# Patient Record
Sex: FEMALE | Race: WHITE | Employment: OTHER | URBAN - METROPOLITAN AREA
[De-identification: names, ages, dates, MRNs, and addresses within clinical notes are randomized per-mention and may not be internally consistent; named-entity substitution may affect disease eponyms.]

---

## 2017-08-11 ENCOUNTER — HOSPITAL ENCOUNTER (EMERGENCY)
Age: 22
Discharge: HOME | End: 2017-08-11
Attending: STUDENT IN AN ORGANIZED HEALTH CARE EDUCATION/TRAINING PROGRAM | Admitting: STUDENT IN AN ORGANIZED HEALTH CARE EDUCATION/TRAINING PROGRAM
Payer: COMMERCIAL

## 2017-08-11 VITALS
OXYGEN SATURATION: 100 % | RESPIRATION RATE: 18 BRPM | HEART RATE: 108 BPM | WEIGHT: 154.76 LBS | TEMPERATURE: 98.6 F | DIASTOLIC BLOOD PRESSURE: 82 MMHG | SYSTOLIC BLOOD PRESSURE: 128 MMHG | BODY MASS INDEX: 24.35 KG/M2

## 2017-08-11 DIAGNOSIS — S01.112A EYEBROW LACERATION, LEFT, INITIAL ENCOUNTER: Primary | ICD-10-CM

## 2017-08-11 PROCEDURE — 77030002986 HC SUT PROL J&J -A

## 2017-08-11 PROCEDURE — 99283 EMERGENCY DEPT VISIT LOW MDM: CPT

## 2017-08-11 PROCEDURE — 75810000293 HC SIMP/SUPERF WND  RPR

## 2017-08-11 PROCEDURE — 74011000250 HC RX REV CODE- 250: Performed by: EMERGENCY MEDICINE

## 2017-08-11 PROCEDURE — 77030018836 HC SOL IRR NACL ICUM -A

## 2017-08-11 RX ORDER — BACITRACIN 500 UNIT/G
1 PACKET (EA) TOPICAL
Status: COMPLETED | OUTPATIENT
Start: 2017-08-11 | End: 2017-08-11

## 2017-08-11 RX ORDER — LIDOCAINE HYDROCHLORIDE AND EPINEPHRINE 10; 10 MG/ML; UG/ML
3 INJECTION, SOLUTION INFILTRATION; PERINEURAL ONCE
Status: COMPLETED | OUTPATIENT
Start: 2017-08-11 | End: 2017-08-11

## 2017-08-11 RX ORDER — MINOCYCLINE HYDROCHLORIDE 50 MG/1
CAPSULE ORAL DAILY
COMMUNITY
End: 2019-05-23

## 2017-08-11 RX ADMIN — BACITRACIN 1 PACKET: 500 OINTMENT TOPICAL at 21:00

## 2017-08-11 RX ADMIN — LIDOCAINE HYDROCHLORIDE AND EPINEPHRINE 30 MG: 10; 10 INJECTION, SOLUTION INFILTRATION; PERINEURAL at 21:00

## 2017-08-12 NOTE — DISCHARGE INSTRUCTIONS
We hope that we have addressed all of your medical concerns. The examination and treatment you received in the Emergency Department were for an emergent problem and were not intended as complete care. It is important that you follow up with your healthcare provider(s) for ongoing care. If your symptoms worsen or do not improve as expected, and you are unable to reach your usual health care provider(s), you should return to the Emergency Department. Today's healthcare is undergoing tremendous change, and patient satisfaction surveys are one of the many tools to assess the quality of medical care. You may receive a survey from the [facility] regarding your experience in the Emergency Department. I hope that your experience has been completely positive, particularly the medical care that I provided. As such, please participate in the survey; anything less than excellent does not meet my expectations or intentions. Thank you for allowing us to provide you with medical care today. We realize that you have many choices for your emergency care needs. Please choose us in the future for any continued health care needs. Lonnie Diallo [facility], [address]: [phone]            No results found for this or any previous visit (from the past 24 hour(s)). No results found. Cuts on the Face Closed With Stitches: Care Instructions  Your Care Instructions  A cut on your face can be on your chin, cheek, nose, forehead, eyelid, lip, or ear. The doctor used stitches to close the cut. Using stitches helps the cut heal and reduces scarring. The doctor may also have called in a specialist, such as a plastic surgeon, to close the cut. If the cut went deep and through the skin, the doctor may have put in two layers of stitches. The deeper layer brings the deep part of the cut together.  These stitches will dissolve and don't need to be removed. The stitches in the upper layer are the ones you see on the cut. You will probably have a bandage. You will need to have the stitches removed, usually in 3 to 5 days. The doctor has checked you carefully, but problems can develop later. If you notice any problems or new symptoms, get medical treatment right away. Follow-up care is a key part of your treatment and safety. Be sure to make and go to all appointments, and call your doctor if you are having problems. It's also a good idea to know your test results and keep a list of the medicines you take. How can you care for yourself at home? · Keep the cut dry for the first 24 to 48 hours. After this, you can shower if your doctor okays it. Pat the cut dry. · Don't soak the cut, such as in a bathtub. Your doctor will tell you when it's safe to get the cut wet. · If your doctor told you how to care for your cut, follow your doctor's instructions. If you did not get instructions, follow this general advice:  ¨ After the first 24 to 48 hours, wash around the cut with clean water 2 times a day. Don't use hydrogen peroxide or alcohol, which can slow healing. ¨ You may cover the cut with a thin layer of petroleum jelly, such as Vaseline, and a nonstick bandage. ¨ Apply more petroleum jelly and replace the bandage as needed. · Avoid any activity that could cause your cut to reopen. · Do not remove the stitches on your own. Your doctor will tell you when to come back to have the stitches removed. · Be safe with medicines. Take pain medicines exactly as directed. ¨ If the doctor gave you a prescription medicine for pain, take it as prescribed. ¨ If you are not taking a prescription pain medicine, ask your doctor if you can take an over-the-counter medicine. When should you call for help? Call your doctor now or seek immediate medical care if:  · You have new pain, or your pain gets worse.   · The skin near the cut is cold or pale or changes color. · You have tingling, weakness, or numbness near the cut. · The cut starts to bleed, and blood soaks through the bandage. Oozing small amounts of blood is normal.  · You have symptoms of infection, such as:  ¨ Increased pain, swelling, warmth, or redness around the cut. ¨ Red streaks leading from the cut. ¨ Pus draining from the cut. ¨ A fever. Watch closely for changes in your health, and be sure to contact your doctor if:  · You do not get better as expected. Where can you learn more? Go to http://tila-jeannette.info/. Enter M384 in the search box to learn more about \"Cuts on the Face Closed With Stitches: Care Instructions. \"  Current as of: March 20, 2017  Content Version: 11.3  © 4174-7844 [employer]. Care instructions adapted under license by [employer] (which disclaims liability or warranty for this information). If you have questions about a medical condition or this instruction, always ask your healthcare professional. [place] [address] any warranty or liability for your use of this information.

## 2017-08-12 NOTE — ED NOTES
Assumed care of patient. Patient resting comfortably with no complaints at this time. Call bell within reach. POC discussed.

## 2017-08-12 NOTE — ED TRIAGE NOTES
Triage: patient states she fell and has a cut in her left eyebrow. No meds PTA. Hemodynamically stable.

## 2017-08-12 NOTE — ED NOTES
Bacitracin placed onto suture site. Patient tolerated well. Patient educated on cleaning and follow up for sutures. Patient verbalized understanding. Ping DUGGAN gave and reviewed discharge instructions with the pt. The pt verbalized understanding. The pt was given opportunity for questions. Patient discharged in stable condition to the waiting room via ambulation.

## 2017-08-12 NOTE — ED PROVIDER NOTES
HPI Comments: 26 yo female presents to ER for laceration to left eyebrow. Pt was out his morning at 6:00 am when she slipped and fell cutting eyebrow. Pt admits to drinking last night. Pt with no LOC. No nausea, vomiting, headache. No numbness/tingling of the fingers. No dizziness or lightheadedness. No abdominal pain, no visual disturbances. No body aches. No back pain, no medicines taken prior to arrival. Tetanus is up to date. Per mom patient acting normal self. Pain rated 2/10. Social hx  Lives with parents  +smoker      Patient is a 25 y.o. female presenting with skin laceration. The history is provided by the patient. Laceration    Pertinent negatives include no numbness and no weakness. Past Medical History:   Diagnosis Date    Contact dermatitis and other eczema, due to unspecified cause     Acne    Depression     Irregular menses     PCOS (polycystic ovarian syndrome)     PCOS (polycystic ovarian syndrome)        Past Surgical History:   Procedure Laterality Date    HX ADENOIDECTOMY  2001    HX TONSILLECTOMY  2010    HX WISDOM TEETH EXTRACTION           Family History:   Problem Relation Age of Onset    Alcohol abuse Father     Diabetes Maternal Grandfather     Other Maternal Grandmother      macular degeneration       Social History     Social History    Marital status: SINGLE     Spouse name: N/A    Number of children: N/A    Years of education: N/A     Occupational History    Not on file. Social History Main Topics    Smoking status: Current Every Day Smoker     Packs/day: 0.25     Years: 1.00     Types: Cigarettes    Smokeless tobacco: Never Used    Alcohol use No    Drug use: No    Sexual activity: Not Currently     Partners: Male     Birth control/ protection: Condom, Pill     Other Topics Concern    Not on file     Social History Narrative         ALLERGIES: Sulfa (sulfonamide antibiotics)    Review of Systems   Constitutional: Negative for chills and fatigue. HENT: Negative for trouble swallowing. Eyes: Negative for photophobia and visual disturbance. Respiratory: Negative for cough, chest tightness and shortness of breath. Cardiovascular: Negative for chest pain. Gastrointestinal: Negative for abdominal pain, nausea and vomiting. Musculoskeletal: Negative for arthralgias, joint swelling, myalgias, neck pain and neck stiffness. Skin: Positive for wound. Negative for color change and rash. Neurological: Negative for dizziness, weakness, light-headedness, numbness and headaches. All other systems reviewed and are negative. Vitals:    08/11/17 2013 08/11/17 2015   BP:  (!) 137/97   Pulse:  (!) 128   Resp:  18   Temp:  98.2 °F (36.8 °C)   SpO2:  100%   Weight: 70.2 kg (154 lb 12.2 oz)             Physical Exam   Constitutional: She is oriented to person, place, and time. She appears well-developed and well-nourished. HENT:   Head: Normocephalic and atraumatic. Right Ear: External ear normal.   Left Ear: External ear normal.   Nose: Nose normal.   Mouth/Throat: Oropharynx is clear and moist.   Left eyebrow:  1 cm horizontal laceration. No bony tenderness. Eyes: Conjunctivae and EOM are normal. Pupils are equal, round, and reactive to light. Right eye exhibits no discharge. Left eye exhibits no discharge. Neck: Normal range of motion. Neck supple. No cervical midline tenderness to palpation of cspine. No stepoffs, no deformity, no edema, no ecchymosis. NO midline pain with FROM of neck. Cardiovascular: Normal rate, regular rhythm and normal heart sounds. Pulmonary/Chest: Effort normal and breath sounds normal. No respiratory distress. She exhibits no tenderness. No chest wall pain with palpation. Abdominal: Soft. Normal appearance and bowel sounds are normal. She exhibits no distension and no mass. There is no hepatosplenomegaly, splenomegaly or hepatomegaly. There is no tenderness.  There is no rigidity, no rebound, no guarding, no CVA tenderness, no tenderness at McBurney's point and negative Jo's sign. Musculoskeletal: Normal range of motion. She exhibits no edema or tenderness. NO TLS spine pain with palpation. No edema, no ecchymosis, no redness or warmth. 5/5  strength bilaterally  5/5 flexion/extension of hips bilaterally   Neurological: She is alert and oriented to person, place, and time. She has normal reflexes. No cranial nerve deficit. She exhibits normal muscle tone. Coordination normal.   Cranial Nerves:  I: smell  Not tested   II: pupils  Equal, round, reactive to light   III,VII: ptosis  none   III,IV,VI: extraocular muscles   normal   V: mastication  normal   V: facial light touch sensation   normal   VII: facial muscle function   symmetric   VIII: hearing  symmetric   IX: soft palate elevation   normal   XI: sternocleidomastoid strength  5/5   XII: tongue   midline          Skin: Skin is warm and dry. No rash noted. No erythema. Psychiatric: She has a normal mood and affect. Her behavior is normal. Judgment and thought content normal.   Nursing note and vitals reviewed. MDM  Number of Diagnoses or Management Options  Eyebrow laceration, left, initial encounter:   Diagnosis management comments: 24 yo female presenting for eyebrow laceration. Injury 14 hours prior to arrival. With being on face, will wash, clean and repair. 9:44 PM  Wound cleaned and repaired without problem. Pt awake and alert. No neuro deficits. Will discharge with pcp followup. Patient's results have been reviewed with them. Patient and/or family have verbally conveyed their understanding and agreement of the patient's signs, symptoms, diagnosis, treatment and prognosis and additionally agree to follow up as recommended or return to the Emergency Room should their condition change prior to follow-up.   Discharge instructions have also been provided to the patient with some educational information regarding their diagnosis as well a list of reasons why they would want to return to the ER prior to their follow-up appointment should their condition change. ED Course       Wound Repair  Date/Time: 8/11/2017 9:24 PM  Performed by: Radu Manning provider: Jeferson Marquis  Preparation: skin prepped with Betadine, skin prepped with Shur-Clens and sterile field established  Pre-procedure re-eval: Immediately prior to the procedure, the patient was reevaluated and found suitable for the planned procedure and any planned medications. Time out: Immediately prior to the procedure a time out was called to verify the correct patient, procedure, equipment, staff and marking as appropriate. .  Location details: right eyebrow  Wound length:2.5 cm or less  Anesthesia: local infiltration    Anesthesia:  Anesthesia: local infiltration  Local Anesthetic: lidocaine 1% with epinephrine   Anesthetic total: 2 mL  Foreign bodies: no foreign bodies  Irrigation solution: saline  Irrigation method: syringe  Skin closure: Prolene  Number of sutures: 4  Technique: simple  Approximation: close  Dressing: antibiotic ointment  Patient tolerance: Patient tolerated the procedure well with no immediate complications  My total time at bedside, performing this procedure was 16-30 minutes (16). Comments: Wound explored bloodless field. Wound superficial. No bone exposed. Pt case including HPI, PE, and all available lab and radiology results has been discussed with attending physician. Opportunity to evaluate patient has been provided to ER attending. Discharge and prescription plan has been agreed upon.

## 2019-05-22 NOTE — PROGRESS NOTES
Romana Arroyo  26 y.o. female  1995  [id number]  [id number]     [facility] FAMILY MEDICINE: Progress Note       Encounter Date: 5/23/2019    Chief Complaint   Patient presents with    New Patient     seen at [address] [place] [address] Depression     wants to discuss restarting medication (effexor 225mg, has been off for a few months)    Cyst     knot under left armpit x1 month       History provided by patient  History of Present Illness   Romana Arroyo is a 26 y.o. female who presents to clinic today for:      [address] [place] [address] patient who presents to establish PCP care. I personally reviewed and updated the patient's medical, surgical, family and social history. PREVIOUS PRIMARY CARE PROVIDER and SPECIALISTS  [facility]. Patient decided to come to [facility] due to insurance. RECORDS  Provided by patient: none     SPECIALISTS  Patient Care Team:  Hammad Gray MD as PCP - General (Family Practice)  Michele Marrero MD (Pediatric Endocrinology)    Depression Review:  Patient is seen for depression. Current treatment includes Effexor and no other therapies. Prior treatments: numerous/does not recall names. Ongoig symptoms include: anhedonia, depressed mood and poor concentration weight gain. Patient denies: appetite loss, death fantasies, decreased sleep and inappropriate guilt palpitations, dizziness, racing thoughts. Reported side effects from the treatment: n/a    PCOS  Patient history of PCOS and had been on metformin in the past for metabolic syndrome. Acne - patient requesting minocycline. She has been placed on it by dermatology int he past with good response. Since then has been using facial cleanser BID and topical cream but reports that creams cause severe dry skin. [employer]  Did not share w/[facility]. Release signed for records.    Health Maintenance Due   Topic Date Due    Pneumococcal 0-64 years (1 of 1 - PPSV23) 09/06/2003    HPV Age 9Y-34Y (1 - Female 3-dose series) 09/06/2012    DTaP/Tdap/Td series (1 - Tdap) 09/06/2018    PAP AKA CERVICAL CYTOLOGY  09/06/2018     Review of Systems   Review of Systems   Constitutional: Negative for chills, fever, malaise/fatigue and weight loss. HENT: Negative for congestion and sinus pain. Cardiovascular: Negative for chest pain and palpitations. Gastrointestinal: Negative for abdominal pain, constipation, diarrhea, nausea and vomiting. Genitourinary: Negative for dysuria, frequency and urgency. Skin: Positive for rash (acne on face). Negative for itching. Neurological: Negative for dizziness, tingling, tremors and headaches. Psychiatric/Behavioral: Positive for depression. Negative for hallucinations, memory loss, substance abuse and suicidal ideas. The patient is not nervous/anxious and does not have insomnia. Vitals/Objective:     Vitals:    05/23/19 1313   BP: 119/78   Pulse: 85   Resp: 18   Temp: 98.7 °F (37.1 °C)   TempSrc: Oral   SpO2: 100%   Weight: 184 lb (83.5 kg)   Height: 5' 7\" (1.702 m)     Body mass index is 28.82 kg/m². Wt Readings from Last 3 Encounters:   05/23/19 184 lb (83.5 kg)   08/11/17 154 lb 12.2 oz (70.2 kg) (84 %, Z= 0.98)*   06/30/16 170 lb 9.6 oz (77.4 kg) (93 %, Z= 1.46)*     * Growth percentiles are based on CDC (Girls, 2-20 Years) data. Physical Exam   Constitutional: She is oriented to person, place, and time. She appears well-developed and well-nourished. HENT:   Head: Normocephalic and atraumatic. Cardiovascular: Normal rate and regular rhythm. Pulmonary/Chest: Effort normal and breath sounds normal.   Musculoskeletal: She exhibits no edema. Neurological: She is alert and oriented to person, place, and time. Skin: Skin is warm. Rash (acne on face) noted. No results found for this or any previous visit (from the past 24 hour(s)).   Assessment and Plan:     Encounter Diagnoses     ICD-10-CM ICD-9-CM   1. Major depressive disorder with single episode, [facility] F32.4 296.25   2. PCOS (polycystic ovarian syndrome) E28.2 256.4   3. Acne vulgaris L70.0 706.1       1. PCOS (polycystic ovarian syndrome)  - metFORMIN ER (GLUCOPHAGE XR) 500 mg tablet; Take 1 Tab by mouth daily (with dinner). Dispense: 90 Tab; Refill: 1    2. Major depressive disorder with single episode, in partial remission [facility])  Patient to continue see counselor. Resume Effexor.   - venlafaxine-SR (EFFEXOR-XR) 75 mg capsule; Take 1 Cap by mouth daily for 14 days, THEN 2 Caps daily for 14 days, THEN 2 Caps daily for 14 days. Dispense: 70 Cap; Refill: 0    3. Acne vulgaris  - minocycline (MINOCIN, DYNACIN) 100 mg capsule; Take 1 Cap by mouth two (2) times a day for 90 days. Dispense: 180 Cap; Refill: 0      I have discussed the diagnosis with the patient and the intended plan as seen in the above orders. she has expressed understanding. The patient has received an after-visit summary and questions were answered concerning future plans. I have discussed medication side effects and warnings with the patient as well. Electronically Signed: Markell Estrella MD     History/Allergies   Patients past medical, surgical and family histories were reviewed and updated.     Past Medical History:   Diagnosis Date    Depression     Eczema     PCOS (polycystic ovarian syndrome)       Past Surgical History:   Procedure Laterality Date    HX ADENOIDECTOMY  2001    HX TONSILLECTOMY  2010    HX WISDOM TEETH EXTRACTION       Family History   Problem Relation Age of Onset    Alcohol abuse Father     Diabetes Maternal Grandfather     Other Maternal Grandmother         macular degeneration    Cancer Maternal Grandmother         skin    Depression Mother     Atopy Sister     No Known Problems Brother     No Known Problems Paternal Grandmother     No Known Problems Paternal Grandfather     Heart Disease Neg Hx     Hypertension Neg Hx      Social History     Tobacco Use    Smoking status: Current Every Day Smoker     Packs/day: 0.25     Years: 1.00     Pack years: 0.25     Types: Cigarettes    Smokeless tobacco: Never Used   Substance Use Topics    Alcohol use: No    Drug use: No          Allergies   Allergen Reactions    Sulfa (Sulfonamide Antibiotics) Anaphylaxis, Hives and Shortness of Breath       Disposition     Follow-up and Dispositions  ·   Return in about 3 weeks (around 6/13/2019) for f/u mood. .         Future Appointments   Date Time Provider Leesa Wiggins   6/12/2019  3:40 PM Trudy Flores MD [facility] SCHED            Current Medications after this visit     Current Outpatient Medications   Medication Sig    metFORMIN ER (GLUCOPHAGE XR) 500 mg tablet Take 1 Tab by mouth daily (with dinner).  venlafaxine-SR (EFFEXOR-XR) 75 mg capsule Take 1 Cap by mouth daily for 14 days, THEN 2 Caps daily for 14 days, THEN 2 Caps daily for 14 days.  minocycline (MINOCIN, DYNACIN) 100 mg capsule Take 1 Cap by mouth two (2) times a day for 90 days. No current facility-administered medications for this visit.       Medications Discontinued During This Encounter   Medication Reason    metFORMIN ER (GLUCOPHAGE XR) 750 mg tablet Dose Adjustment    VENLAFAXINE HCL (EFFEXOR PO) Reorder    minocycline (MINOCIN, DYNACIN) 50 mg capsule Not A Current Medication    famotidine (PEPCID) 20 mg tablet Not A Current Medication    norgestimate-ethinyl estradiol (ORTHO TRI-CYCLEN, TRI-SPRINTEC) 0.18/0.215/0.25 mg-35 mcg (28) tab Not A Current Medication    spironolactone (ALDACTONE) 25 mg tablet Not A Current Medication    venlafaxine 225 mg tr24 Not A Current Medication

## 2019-05-23 ENCOUNTER — OFFICE VISIT (OUTPATIENT)
Dept: FAMILY MEDICINE CLINIC | Age: 24
End: 2019-05-23

## 2019-05-23 VITALS
HEART RATE: 85 BPM | HEIGHT: 67 IN | WEIGHT: 184 LBS | OXYGEN SATURATION: 100 % | BODY MASS INDEX: 28.88 KG/M2 | TEMPERATURE: 98.7 F | DIASTOLIC BLOOD PRESSURE: 78 MMHG | SYSTOLIC BLOOD PRESSURE: 119 MMHG | RESPIRATION RATE: 18 BRPM

## 2019-05-23 DIAGNOSIS — L70.0 ACNE VULGARIS: ICD-10-CM

## 2019-05-23 DIAGNOSIS — E28.2 PCOS (POLYCYSTIC OVARIAN SYNDROME): ICD-10-CM

## 2019-05-23 DIAGNOSIS — F32.4 MAJOR DEPRESSIVE DISORDER WITH SINGLE EPISODE, IN PARTIAL REMISSION (HCC): Primary | ICD-10-CM

## 2019-05-23 RX ORDER — MINOCYCLINE HYDROCHLORIDE 100 MG/1
100 CAPSULE ORAL 2 TIMES DAILY
Qty: 180 CAP | Refills: 0 | Status: SHIPPED | OUTPATIENT
Start: 2019-05-23 | End: 2019-08-21

## 2019-05-23 RX ORDER — METFORMIN HYDROCHLORIDE 500 MG/1
500 TABLET, EXTENDED RELEASE ORAL
Qty: 90 TAB | Refills: 1 | Status: SHIPPED | OUTPATIENT
Start: 2019-05-23 | End: 2020-02-11

## 2019-05-23 RX ORDER — VENLAFAXINE HYDROCHLORIDE 75 MG/1
CAPSULE, EXTENDED RELEASE ORAL
Qty: 70 CAP | Refills: 0 | Status: SHIPPED | OUTPATIENT
Start: 2019-05-23 | End: 2019-07-02 | Stop reason: SDUPTHER

## 2019-05-23 RX ORDER — VENLAFAXINE HYDROCHLORIDE 225 MG/1
TABLET, EXTENDED RELEASE ORAL
Refills: 0 | COMMUNITY
Start: 2019-03-01 | End: 2019-05-23

## 2019-05-23 NOTE — PROGRESS NOTES
1. Have you been to the ER, urgent care clinic, or been hospitalized since your last visit? No     2. Have you seen or consulted any other health care providers outside of the [address] [place] [address] since your last visit?   No     Reviewed record in preparation for visit and have necessary documentation  opportunity was given for questions  Goals that were addressed and/or need to be completed during or after this appointment include   Health Maintenance Due   Topic Date Due    Pneumococcal 0-64 years (1 of 1 - PPSV23) 09/06/2003    HPV Age 9Y-34Y (1 - Female 3-dose series) 09/06/2012    DTaP/Tdap/Td series (1 - Tdap) 09/06/2018    PAP AKA CERVICAL CYTOLOGY  09/06/2018

## 2019-05-23 NOTE — PATIENT INSTRUCTIONS
Acne Medicine: Care Instructions  Your Care Instructions    Medicines can help acne. They can clean skin pores, kill germs, and reduce skin oil. And they can reduce the effects of hormones. The type of medicine you take depends on the type of acne you have. The best treatment often is a mix of medicines. For example, you might take pills and put medicine on your skin. Common acne medicines include:  · Benzoyl peroxide. This includes Benzac or Brevoxyl. · Salicylic acid. This includes Propa pH or Stridex. · Retinoid medicines you put on your skin. These include adapalene (Differin) and tretinoin (Retin-A). · Antibiotic pills or ointments. These include erythromycin and tetracycline. · Some birth control pills. Antibiotics for acne can cause side effects. They include yeast infections (in women) and diarrhea. Let your doctor know if you have side effects. Tell your doctor if you are breastfeeding or if you're pregnant or think you might get pregnant. Some of these medicines are not safe to take when you are pregnant or breastfeeding. Women who take some medicines need to use birth control. Follow-up care is a key part of your treatment and safety. Be sure to make and go to all appointments, and call your doctor if you are having problems. It's also a good idea to know your test results and keep a list of the medicines you take. How can you care for yourself at home? · Take your medicines exactly as prescribed. Call your doctor if you think you are having a problem with your medicine. You will get more details on the specific medicines your doctor prescribes. When should you call for help? Call your doctor now or seek immediate medical care if:    · You have signs of an infection, such as:  ? Increased pain, swelling, warmth, and redness. ? Red streaks leading from the affected area. ? Pus draining from the area.   ? A fever.    Watch closely for changes in your health, and be sure to contact your doctor if:    · You think you may be having a problem with the medicine.     · You do not get better as expected. Where can you learn more? Go to http://tila-jeannette.info/. Enter C526 in the search box to learn more about \"Acne Medicine: Care Instructions. \"  Current as of: April 17, 2018  Content Version: 11.9  © 1022-1827 [employer]. Care instructions adapted under license by [employer] (which disclaims liability or warranty for this information). If you have questions about a medical condition or this instruction, always ask your healthcare professional. [place] [address] any warranty or liability for your use of this information.

## 2019-07-02 DIAGNOSIS — L70.0 ACNE VULGARIS: ICD-10-CM

## 2019-07-02 DIAGNOSIS — F32.4 MAJOR DEPRESSIVE DISORDER WITH SINGLE EPISODE, IN PARTIAL REMISSION (HCC): ICD-10-CM

## 2019-07-02 NOTE — TELEPHONE ENCOUNTER
Patient seeking refill. She had insurance issues but has cleared this up but was hoping to get this refilled before going out of town on 07/04/19.     Patient has appt scheduled for 07/16/19

## 2019-07-03 RX ORDER — VENLAFAXINE HYDROCHLORIDE 75 MG/1
75 CAPSULE, EXTENDED RELEASE ORAL DAILY
Qty: 30 CAP | Refills: 1 | Status: SHIPPED | OUTPATIENT
Start: 2019-07-03 | End: 2019-07-16 | Stop reason: SDUPTHER

## 2019-07-16 ENCOUNTER — OFFICE VISIT (OUTPATIENT)
Dept: FAMILY MEDICINE CLINIC | Age: 24
End: 2019-07-16

## 2019-07-16 VITALS
HEART RATE: 120 BPM | HEIGHT: 67 IN | TEMPERATURE: 98.5 F | SYSTOLIC BLOOD PRESSURE: 114 MMHG | RESPIRATION RATE: 16 BRPM | WEIGHT: 198 LBS | DIASTOLIC BLOOD PRESSURE: 80 MMHG | OXYGEN SATURATION: 99 % | BODY MASS INDEX: 31.08 KG/M2

## 2019-07-16 DIAGNOSIS — E28.2 PCOS (POLYCYSTIC OVARIAN SYNDROME): ICD-10-CM

## 2019-07-16 DIAGNOSIS — F32.4 MAJOR DEPRESSIVE DISORDER WITH SINGLE EPISODE, IN PARTIAL REMISSION (HCC): Primary | ICD-10-CM

## 2019-07-16 RX ORDER — VENLAFAXINE HYDROCHLORIDE 150 MG/1
150 CAPSULE, EXTENDED RELEASE ORAL DAILY
Qty: 90 CAP | Refills: 1 | Status: SHIPPED | OUTPATIENT
Start: 2019-07-16 | End: 2020-01-15

## 2019-07-16 NOTE — PROGRESS NOTES
1. Have you been to the ER, urgent care clinic since your last visit? Hospitalized since your last visit? No    2. Have you seen or consulted any other health care providers outside of the [address] [place] [address] since your last visit? Include any pap smears or colon screening.  No

## 2019-07-16 NOTE — PATIENT INSTRUCTIONS

## 2019-07-16 NOTE — PROGRESS NOTES
Radha Iglesias  26 y.o. female  1995  [id number]    [facility]  Progress Note     Encounter Date: 7/16/2019    Assessment and Plan:     Encounter Diagnoses     ICD-10-CM ICD-9-CM   1. Major depressive disorder with single episode, in partial remission ([facility] F32.4 296.25   2. PCOS (polycystic ovarian syndrome) E28.2 256.4       1. Major depressive disorder with single episode, in partial remission ([facility]  Doing well on current dose of effexor. No need to increase.  - venlafaxine-SR (EFFEXOR-XR) 150 mg capsule; Take 1 Cap by mouth daily. Dispense: 90 Cap; Refill: 1  - METABOLIC PANEL, BASIC    2. PCOS (polycystic ovarian syndrome)  Lab work. - LIPID PANEL  - TSH REFLEX TO T4  - HEMOGLOBIN A1C WITH EAG      I have discussed the diagnosis with the patient and the intended plan as seen in the above orders. she has expressed understanding. The patient has received an after-visit summary and questions were answered concerning future plans. I have discussed medication side effects and warnings with the patient as well. Electronically Signed: Cruz Blunt MD    Current Medications after this visit     Current Outpatient Medications   Medication Sig    venlafaxine-SR (EFFEXOR-XR) 150 mg capsule Take 1 Cap by mouth daily.  metFORMIN ER (GLUCOPHAGE XR) 500 mg tablet Take 1 Tab by mouth daily (with dinner).  minocycline (MINOCIN, DYNACIN) 100 mg capsule Take 1 Cap by mouth two (2) times a day for 90 days. No current facility-administered medications for this visit.       Medications Discontinued During This Encounter   Medication Reason    venlafaxine-SR [facility] 75 mg capsule Reorder     ~~~~~~~~~~~~~~~~~~~~~~~~~~~~~~~~~~~~~~~~~~~~~~    Chief Complaint   Patient presents with    Depression    Medication Evaluation       History provided by patient  History of Present Illness   Radha Iglesias is a 26 y.o. female who presents to clinic today for:  Depression Review:  Patient is seen for depression. Current treatment includes Effexor and no other therapies. Prior treatments:numerous/does not recall names. Ongoig symptoms include: depressed mood racing thoughts. Patient denies: anhedonia, hopelessness and inappropriate guilt palpitations, chest pain, shortness of breath. Reported side effects from the treatment: none    Health Maintenance  Asked to schedule appt for wellness. Health Maintenance Due   Topic Date Due    Pneumococcal 0-64 years (1 of 1 - PPSV23) 09/06/2003    HPV Age 9Y-34Y (1 - Female 3-dose series) 09/06/2012    DTaP/Tdap/Td series (1 - Tdap) 09/06/2018    PAP AKA CERVICAL CYTOLOGY  09/06/2018     Review of Systems   Review of Systems   Constitutional: Negative for chills, fever, malaise/fatigue and weight loss. Cardiovascular: Negative for chest pain and palpitations. Gastrointestinal: Negative for abdominal pain, constipation, diarrhea, nausea and vomiting. Skin: Negative for itching and rash. Neurological: Negative for dizziness, tingling and headaches. Psychiatric/Behavioral: Negative for depression, hallucinations and substance abuse. The patient is not nervous/anxious and does not have insomnia. Vitals/Objective:     Vitals:    07/16/19 1445   BP: 114/80   Pulse: (!) 120   Resp: 16   Temp: 98.5 °F (36.9 °C)   TempSrc: Oral   SpO2: 99%   Weight: 198 lb (89.8 kg)   Height: 5' 7\" (1.702 m)     Body mass index is 31.01 kg/m². Wt Readings from Last 3 Encounters:   07/16/19 198 lb (89.8 kg)   05/23/19 184 lb (83.5 kg)   08/11/17 154 lb 12.2 oz (70.2 kg) (84 %, Z= 0.98)*     * Growth percentiles are based on CDC (Girls, 2-20 Years) data. Physical Exam   Constitutional: She appears well-developed and well-nourished. HENT:   Head: Normocephalic and atraumatic. Right Ear: External ear normal.   Left Ear: External ear normal.   Cardiovascular: Normal rate and regular rhythm. No murmur heard.   [id number]   Pulmonary/Chest: Effort normal and breath sounds normal.        No results found for this or any previous visit (from the past 24 hour(s)). Disposition     Follow-up and Dispositions  ·   Return in about 3 months (around 10/16/2019) for Please schedule an appt for Well Woman with Pap., Please do fasting blood work 2 days prior to appt. .       No future appointments. History   Patient's past medical, surgical and family histories were reviewed and updated.     Past Medical History:   Diagnosis Date    Depression     Eczema     PCOS (polycystic ovarian syndrome)      Past Surgical History:   Procedure Laterality Date    HX ADENOIDECTOMY  2001    HX TONSILLECTOMY  2010    HX WISDOM TEETH EXTRACTION       Family History   Problem Relation Age of Onset    Alcohol abuse Father     Diabetes Maternal Grandfather     Other Maternal Grandmother         macular degeneration    Cancer Maternal Grandmother         skin    Depression Mother     Atopy Sister     No Known Problems Brother     No Known Problems Paternal Grandmother     No Known Problems Paternal Grandfather     Heart Disease Neg Hx     Hypertension Neg Hx      Social History     Tobacco Use    Smoking status: Current Every Day Smoker     Packs/day: 0.25     Years: 1.00     Pack years: 0.25     Types: Cigarettes    Smokeless tobacco: Never Used   Substance Use Topics    Alcohol use: No    Drug use: No       Allergies     Allergies   Allergen Reactions    Sulfa (Sulfonamide Antibiotics) Anaphylaxis, Hives and Shortness of Breath

## 2020-02-11 ENCOUNTER — OFFICE VISIT (OUTPATIENT)
Dept: FAMILY MEDICINE CLINIC | Age: 25
End: 2020-02-11

## 2020-02-11 VITALS
TEMPERATURE: 98.3 F | OXYGEN SATURATION: 97 % | RESPIRATION RATE: 16 BRPM | WEIGHT: 215.8 LBS | SYSTOLIC BLOOD PRESSURE: 110 MMHG | BODY MASS INDEX: 33.87 KG/M2 | DIASTOLIC BLOOD PRESSURE: 81 MMHG | HEIGHT: 67 IN | HEART RATE: 118 BPM

## 2020-02-11 DIAGNOSIS — F32.4 MAJOR DEPRESSIVE DISORDER WITH SINGLE EPISODE, IN PARTIAL REMISSION (HCC): Primary | ICD-10-CM

## 2020-02-11 DIAGNOSIS — R00.0 TACHYCARDIA: ICD-10-CM

## 2020-02-11 DIAGNOSIS — E28.2 PCOS (POLYCYSTIC OVARIAN SYNDROME): ICD-10-CM

## 2020-02-11 RX ORDER — METFORMIN HYDROCHLORIDE 500 MG/1
500 TABLET, EXTENDED RELEASE ORAL
Qty: 90 TAB | Refills: 1 | Status: SHIPPED | OUTPATIENT
Start: 2020-02-11 | End: 2020-05-22 | Stop reason: SDUPTHER

## 2020-02-11 RX ORDER — VENLAFAXINE HYDROCHLORIDE 225 MG/1
225 TABLET, EXTENDED RELEASE ORAL DAILY
Qty: 30 TAB | Refills: 5 | Status: SHIPPED | OUTPATIENT
Start: 2020-02-11 | End: 2020-03-19

## 2020-02-11 RX ORDER — VENLAFAXINE HYDROCHLORIDE 150 MG/1
CAPSULE, EXTENDED RELEASE ORAL
Qty: 30 CAP | Refills: 0 | Status: CANCELLED | OUTPATIENT
Start: 2020-02-11

## 2020-02-11 NOTE — PROGRESS NOTES
Identified pt with two pt identifiers(name and ). Reviewed record in preparation for visit and have obtained necessary documentation. Chief Complaint   Patient presents with    Medication Evaluation     f/u    Medication Refill        Health Maintenance Due   Topic    Pneumococcal 0-64 years (1 of 1 - PPSV23)    HPV Age 9Y-34Y (1 - Female 2-dose series)    DTaP/Tdap/Td series (1 - Tdap)    PAP AKA CERVICAL CYTOLOGY    -Pt declines flu shot    Coordination of Care Questionnaire:  :   1) Have you been to an emergency room, urgent care, or hospitalized since your last visit? If yes, where when, and reason for visit? no      2. Have seen or consulted any other health care provider since your last visit? If yes, where when, and reason for visit?  no        Patient is accompanied by self I have received verbal consent from Chan Ortiz to discuss any/all medical information while they are present in the room.

## 2020-02-11 NOTE — PROGRESS NOTES
Herman Perez  27 y.o. female  1995  [id number]    [facility]  Progress Note     Encounter Date: 2/11/2020    Assessment and Plan:     Encounter Diagnoses     ICD-10-CM ICD-9-CM   1. Major depressive disorder with single episode, in partial remission ([facility] F32.4 296.25   2. PCOS (polycystic ovarian syndrome) E28.2 256.4   3. Tachycardia R00.0 785.0       1. Major depressive disorder with single episode, in partial remission ([facility])  No controlled. Increased dose of effexor to 225 mg. Close follow up on medication is no change in 2 weeks. - Venlafaxine-ER 24 HR (EFFEXOR-ER) 225 mg tr24 tablet; Take 1 Tab by mouth daily. Dispense: 30 Tab; Refill: 5    2. PCOS (polycystic ovarian syndrome)  3. Tachycardia  Resume metformin. CHeck labs including TSH given tachycardia. - metFORMIN ER (GLUCOPHAGE XR) 500 mg tablet; Take 1 Tab by mouth daily (with dinner). Dispense: 90 Tab; Refill: 1  - HEMOGLOBIN A1C WITH EAG; Future  - LIPID PANEL; Future  - METABOLIC PANEL, COMPREHENSIVE; Future      I have discussed the diagnosis with the patient and the intended plan as seen in the above orders. she has expressed understanding. The patient has received an after-visit summary and questions were answered concerning future plans. I have discussed medication side effects and warnings with the patient as well. Electronically Signed: Carla Cheema MD    Current Medications after this visit     Current Outpatient Medications   Medication Sig    metFORMIN ER (GLUCOPHAGE XR) 500 mg tablet Take 1 Tab by mouth daily (with dinner).  Venlafaxine-ER 24 HR (EFFEXOR-ER) 225 mg tr24 tablet Take 1 Tab by mouth daily. No current facility-administered medications for this visit.       Medications Discontinued During This Encounter   Medication Reason    metFORMIN ER (GLUCOPHAGE XR) 500 mg tablet Not A Current Medication    venlafaxine-SR (EFFEXOR-XR) 150 mg capsule Dose Adjustment ~~~~~~~~~~~~~~~~~~~~~~~~~~~~~~~~~~~~~~~~~~~~~~    Chief Complaint   Patient presents with    Medication Evaluation     f/u    Medication Refill       History provided by patient  History of Present Illness   Madeleine Sapp is a 27 y.o. female who presents to clinic today for:    Depression Review:  Patient is seen for anxiety disorder. .  Current treatment includes Effexor and no other therapies. Reported side effects from the treatment: none    3 most recent PHQ Screens 2/11/2020   Little interest or pleasure in doing things Nearly every day   Feeling down, depressed, irritable, or hopeless Not at all   Total Score PHQ 2 3   Trouble falling or staying asleep, or sleeping too much Several days   Feeling tired or having little energy Nearly every day   Poor appetite, weight loss, or overeating Several days   Feeling bad about yourself - or that you are a failure or have let yourself or your family down Not at all   Trouble concentrating on things such as school, work, reading, or watching TV Nearly every day   Moving or speaking so slowly that other people could have noticed; or the opposite being so fidgety that others notice Not at all   Thoughts of being better off dead, or hurting yourself in some way Not at all   PHQ 9 Score 11     JACINTA 2/7 2/11/2020   Feeling nervous, anxious or on edge? 1   Not being able to stop or control worrying? 0   JACINTA-2 Subtotal 1   Worrying too much about different things? 1   Trouble relaxing? 2   Being so restless that it is hard to sit still? 0   Becoming easily annoyed or irritable? 0   Feeling afraid as if something awful might happen? 0   JACINTA-7 Total Score 4           Health Maintenance  Asked patient to schedule a Wellness appt to discuss issues.   Health Maintenance Due   Topic Date Due    Pneumococcal 0-64 years (1 of 1 - PPSV23) 09/06/2003    HPV Age 9Y-34Y (1 - Female 2-dose series) 09/06/2008    DTaP/Tdap/Td series (1 - Tdap) 09/06/2008    PAP AKA CERVICAL CYTOLOGY  09/06/2018     Review of Systems   Review of Systems   Constitutional:        See HPI for pertinent review of systems        Vitals/Objective:     Vitals:    02/11/20 1300   BP: 110/81   Pulse: (!) 118   Resp: 16   Temp: 98.3 °F (36.8 °C)   TempSrc: Oral   SpO2: 97%   Weight: 215 lb 12.8 oz (97.9 kg)   Height: 5' 7\" (1.702 m)     Body mass index is 33.8 kg/m². Wt Readings from Last 3 Encounters:   02/11/20 215 lb 12.8 oz (97.9 kg)   07/16/19 198 lb (89.8 kg)   05/23/19 184 lb (83.5 kg)       Physical Exam  Constitutional:       General: She is not in acute distress. Appearance: Normal appearance. She is well-developed. She is not diaphoretic. HENT:      Head: Normocephalic and atraumatic. Right Ear: Tympanic membrane, ear canal and external ear normal.      Left Ear: Tympanic membrane, ear canal and external ear normal.      Mouth/Throat:      Pharynx: No oropharyngeal exudate or posterior oropharyngeal erythema. Eyes:      General:         Right eye: No discharge. Left eye: No discharge. Conjunctiva/sclera: Conjunctivae normal.   Cardiovascular:      Rate and Rhythm: Normal rate and regular rhythm. Heart sounds: S1 normal and S2 normal. No murmur. Pulmonary:      Effort: Pulmonary effort is normal.      Breath sounds: Normal breath sounds. No rales. Musculoskeletal:      Right lower leg: No edema. Left lower leg: No edema. Skin:     General: Skin is warm and dry. Capillary Refill: Capillary refill takes less than 2 seconds. Neurological:      Mental Status: She is alert and oriented to person, place, and time. No results found for this or any previous visit (from the past 24 hour(s)). Disposition     Follow-up and Dispositions  ·   Return in about 2 weeks (around 2/25/2020) for Mood. , Please schedule an appt for Well Woman with Pap. .       No future appointments.     History   Patient's past medical, surgical and family histories were reviewed and updated.     Past Medical History:   Diagnosis Date    Depression     Eczema     PCOS (polycystic ovarian syndrome)      Past Surgical History:   Procedure Laterality Date    HX ADENOIDECTOMY  2001    HX TONSILLECTOMY  2010    HX WISDOM TEETH EXTRACTION       Family History   Problem Relation Age of Onset    Alcohol abuse Father     Diabetes Maternal Grandfather     Other Maternal Grandmother         macular degeneration    Cancer Maternal Grandmother         skin    Depression Mother     Atopy Sister     No Known Problems Brother     No Known Problems Paternal Grandmother     No Known Problems Paternal Grandfather     Heart Disease Neg Hx     Hypertension Neg Hx      Social History     Tobacco Use    Smoking status: Current Every Day Smoker     Packs/day: 0.25     Years: 1.00     Pack years: 0.25     Types: Cigarettes    Smokeless tobacco: Never Used   Substance Use Topics    Alcohol use: No    Drug use: No       Allergies     Allergies   Allergen Reactions    Sulfa (Sulfonamide Antibiotics) Anaphylaxis, Hives and Shortness of Breath

## 2020-02-11 NOTE — PATIENT INSTRUCTIONS
Polycystic Ovary Syndrome: Care Instructions Your Care Instructions Polycystic ovary syndrome, or PCOS, means a woman's hormones are out of balance. It can cause problems with your periods and make it hard to get pregnant. Doctors don't know for sure what causes PCOS, but it seems to run in families. It also seems to be linked to obesity and a risk for diabetes. If you have PCOS, your sisters and daughters have a higher chance of getting it too. You may have other symptoms. These include weight gain, acne, too much hair growth on the face or body, high blood pressure, and high blood sugar. Your ovaries may have cysts on them. These cysts are growths filled with fluid. Keep in mind that although you may not have regular periods, you can still get pregnant. Talk to your doctor about birth control if you do not want to get pregnant. Sometimes the hormone changes with PCOS can also make it hard for some women to get pregnant. If this is a concern, talk to your doctor about treatment for this problem. Women who have PCOS can go for months or longer with no period. Your doctor may recommend medicines that can help get your cycles back to normal. 
Follow-up care is a key part of your treatment and safety. Be sure to make and go to all appointments, and call your doctor if you are having problems. It's also a good idea to know your test results and keep a list of the medicines you take. How can you care for yourself at home? · Take your medicines exactly as prescribed. Call your doctor if you think you are having a problem with your medicine. · Eat a healthy diet. Include fruits, vegetables, beans, and whole grains in your diet each day. · If you are overweight, losing weight can help with many of the symptoms of PCOS. Talk to your doctor about safe ways to lose weight. · Get at least 30 minutes of exercise on most days of the week.  Walking is a good choice. Or you can run, swim, cycle, or play tennis or team sports. · For hair growth you don't want, try bleaching, plucking, electrolysis, or laser therapy. · Acne can be treated with over-the-counter medicines. Look for ones that have benzoyl peroxide or salicylic acid in them. When should you call for help? Call your doctor now or seek immediate medical care if: 
  · You have severe vaginal bleeding.  
  · You have new or worse belly or pelvic pain.  
 Watch closely for changes in your health, and be sure to contact your doctor if: 
  · You do not get better as expected.  
  · You have unusual vaginal bleeding. Where can you learn more? Go to http://tila-jeannette.info/. Enter M554 in the search box to learn more about \"Polycystic Ovary Syndrome: Care Instructions. \" Current as of: February 19, 2019 Content Version: 12.2 © 4221-9875 [employer]. Care instructions adapted under license by [employer] (which disclaims liability or warranty for this information). If you have questions about a medical condition or this instruction, always ask your healthcare professional. [address] any warranty or liability for your use of this information.

## 2020-02-13 ENCOUNTER — TELEPHONE (OUTPATIENT)
Dept: FAMILY MEDICINE CLINIC | Age: 25
End: 2020-02-13

## 2020-02-13 NOTE — TELEPHONE ENCOUNTER
Spoke with pt and she states that Pharmacy is having trouble filling RX(s) due to Prior authorization problems. Is there an alternative to these medications.

## 2020-02-18 DIAGNOSIS — F32.4 MAJOR DEPRESSIVE DISORDER WITH SINGLE EPISODE, IN PARTIAL REMISSION (HCC): ICD-10-CM

## 2020-02-18 DIAGNOSIS — F32.4 MAJOR DEPRESSIVE DISORDER WITH SINGLE EPISODE, IN PARTIAL REMISSION (HCC): Primary | ICD-10-CM

## 2020-02-18 RX ORDER — VENLAFAXINE 75 MG/1
75 TABLET ORAL 3 TIMES DAILY
Qty: 90 TAB | Refills: 0 | Status: SHIPPED | OUTPATIENT
Start: 2020-02-18 | End: 2020-02-18 | Stop reason: SDUPTHER

## 2020-02-18 RX ORDER — VENLAFAXINE 75 MG/1
75 TABLET ORAL 3 TIMES DAILY
Qty: 90 TAB | Refills: 0 | Status: SHIPPED | OUTPATIENT
Start: 2020-02-18 | End: 2020-03-19 | Stop reason: SDUPTHER

## 2020-02-18 NOTE — PROGRESS NOTES
Patient's PA for effexor is still pending. Patient paid $80 for 6 tablets of the 225 mg XL. Will send in 75 TID.      Truong Vides MD

## 2020-02-19 ENCOUNTER — DOCUMENTATION ONLY (OUTPATIENT)
Dept: FAMILY MEDICINE CLINIC | Age: 25
End: 2020-02-19

## 2020-02-19 NOTE — PROGRESS NOTES
PA for Venlafaxinen  mg Forms completed and faxed by 02/18/20 to [phone].     Confirmation: OK

## 2020-02-21 ENCOUNTER — TELEPHONE (OUTPATIENT)
Dept: FAMILY MEDICINE CLINIC | Age: 25
End: 2020-02-21

## 2020-02-21 NOTE — TELEPHONE ENCOUNTER
Received determination regarding venlafaxine  mg will not be covered unless she has tried and failed 2 of the following:  Bupropion IR/SR/XL, desvenlafaxine Succinate ER, mirtazapine ODT & tablet, trazodone, and venlafaxine IR/ER capsule. If the patient is doing well with venlafaxine 75 mg three times a day, no change is necessary. She should follow up next week    Henrry Gonzales MD

## 2020-02-21 NOTE — TELEPHONE ENCOUNTER
Called pt and asked how the 75mg 3x daily was going. She stated she just started that dosing today so unsure how it will go. Also informed her ins will not cover unless failed on 2 other medications.  She stated she will try this current dosing and call back to make an appt

## 2020-03-19 DIAGNOSIS — F32.4 MAJOR DEPRESSIVE DISORDER WITH SINGLE EPISODE, IN PARTIAL REMISSION (HCC): ICD-10-CM

## 2020-03-19 RX ORDER — VENLAFAXINE 75 MG/1
75 TABLET ORAL 3 TIMES DAILY
Qty: 90 TAB | Refills: 0 | Status: SHIPPED | OUTPATIENT
Start: 2020-03-19 | End: 2020-04-23 | Stop reason: SDUPTHER

## 2020-04-22 ENCOUNTER — TELEPHONE (OUTPATIENT)
Dept: FAMILY MEDICINE CLINIC | Age: 25
End: 2020-04-22

## 2020-04-22 DIAGNOSIS — F32.4 MAJOR DEPRESSIVE DISORDER WITH SINGLE EPISODE, IN PARTIAL REMISSION (HCC): ICD-10-CM

## 2020-04-22 NOTE — TELEPHONE ENCOUNTER
----- Message from Brenton Howell sent at 4/22/2020  1:36 PM EDT -----  Regarding: Dr. Tawny Giron (if not patient):  Relationship of caller (if not patient):  Best contact number(s):[phone]  Name of medication and dosage if known: \"venaflaxine\" 75 mg  Is patient out of this medication (yes/no): yes  Pharmacy name: [address] listed in chart? (yes/no): yes  Pharmacy phone number: on file  Date of last visit:  Details to clarify the request:

## 2020-04-23 RX ORDER — VENLAFAXINE 75 MG/1
75 TABLET ORAL 3 TIMES DAILY
Qty: 90 TAB | Refills: 0 | Status: SHIPPED | OUTPATIENT
Start: 2020-04-23 | End: 2020-05-22 | Stop reason: SDUPTHER

## 2020-04-23 NOTE — TELEPHONE ENCOUNTER
Patient is overdue for an appointment. A 30-day supply of her medication has been sent to the pharmacy. Please ask that patient set up a virtual appointment or phone call if no video is possible due to the current stay-at-home order/coronavirus outbreak.      Chiara Zhang MD

## 2020-05-22 ENCOUNTER — VIRTUAL VISIT (OUTPATIENT)
Dept: FAMILY MEDICINE CLINIC | Age: 25
End: 2020-05-22

## 2020-05-22 VITALS — BODY MASS INDEX: 33.74 KG/M2 | HEIGHT: 67 IN | WEIGHT: 215 LBS

## 2020-05-22 DIAGNOSIS — E28.2 PCOS (POLYCYSTIC OVARIAN SYNDROME): ICD-10-CM

## 2020-05-22 DIAGNOSIS — F32.4 MAJOR DEPRESSIVE DISORDER WITH SINGLE EPISODE, IN PARTIAL REMISSION (HCC): ICD-10-CM

## 2020-05-22 RX ORDER — VENLAFAXINE 75 MG/1
75 TABLET ORAL 3 TIMES DAILY
Qty: 90 TAB | Refills: 5 | Status: SHIPPED | OUTPATIENT
Start: 2020-05-22 | End: 2020-12-09 | Stop reason: SDUPTHER

## 2020-05-22 RX ORDER — METFORMIN HYDROCHLORIDE 500 MG/1
500 TABLET, EXTENDED RELEASE ORAL
Qty: 90 TAB | Refills: 1 | Status: SHIPPED | OUTPATIENT
Start: 2020-05-22 | End: 2020-12-16

## 2020-05-22 NOTE — PROGRESS NOTES
Amanda Laurent is a 27 y.o. female      Chief Complaint   Patient presents with    Medication Refill     Venlafaxine/ Metformin         1. Have you been to the ER, urgent care clinic since your last visit? Hospitalized since your last visit? No      2. Have you seen or consulted any other health care providers outside of the [address] [place] [address] since your last visit? Include any pap smears or colon screening.    No

## 2020-05-22 NOTE — PROGRESS NOTES
Rhonda Griffin  27 y.o. female  1995  IZW:[id number]    [facility] MEDICINE  Progress Note     Encounter Date: 5/22/2020    Rhonda Griffin is a 27 y.o. female who was seen by synchronous (real-time) audio-video technology on 5/22/2020. She and/or her healthcare decision maker is aware that this patient-initiated Telehealth encounter is a billable service, with coverage as determined by her insurance carrier. She  is aware that she may receive a bill and has provided verbal consent to proceed: Yes    I was at home while conducting this encounter. The patient was checked in/\"roomed\" by Armida Esposito LPN via telephone in the office. The patient was at home during the encounter. This visit was completed virtually using Doxy. me  Assessment and Plan:     Encounter Diagnoses     ICD-10-CM ICD-9-CM   1. Major depressive disorder with single episode, in partial remission [facility] F32.4 296.25   2. PCOS (polycystic ovarian syndrome) E28.2 256.4       1. Major depressive disorder with single episode, in partial remission ([facility])  Mood stable. Continue medication. - venlafaxine (EFFEXOR) 75 mg tablet; Take 1 Tab by mouth three (3) times daily. Dispense: 90 Tab; Refill: 5    2. PCOS (polycystic ovarian syndrome)  Resume metformin. Patient agreed to have labs drawn.   - metFORMIN ER (GLUCOPHAGE XR) 500 mg tablet; Take 1 Tab by mouth daily (with dinner). Dispense: 90 Tab; Refill: 1  - METABOLIC PANEL, BASIC; Future  - HEMOGLOBIN A1C WITH EAG; Future  - TSH 3RD GENERATION; Future  - LIPID PANEL; Future      We discussed the expected course, resolution and complications of the diagnosis(es) in detail. Medication risks, benefits, costs, interactions, and alternatives were discussed as indicated. I advised her to contact the office if her condition worsens, changes or fails to improve as anticipated. She expressed understanding with the diagnosis(es) and plan.      CPT Codes 94518-03394 for Established Patients may apply to this Telehealth Visit      Pursuant to the emergency declaration under the [facility], 1135 waiver authority and the [employer] Act, this Virtual  Visit was conducted, with patient's consent, to reduce the patient's risk of exposure to COVID-19 and provide continuity of care for an established patient. Services were provided through a video synchronous discussion virtually to substitute for in-person clinic visit. Electronically Signed: Elliot Szymanski MD    Current Medications after this visit     Current Outpatient Medications   Medication Sig    venlafaxine (EFFEXOR) 75 mg tablet Take 1 Tab by mouth three (3) times daily.  metFORMIN ER (GLUCOPHAGE XR) 500 mg tablet Take 1 Tab by mouth daily (with dinner). No current facility-administered medications for this visit. Medications Discontinued During This Encounter   Medication Reason    venlafaxine (EFFEXOR) 75 mg tablet Reorder    metFORMIN ER (GLUCOPHAGE XR) 500 mg tablet Reorder     ~~~~~~~~~~~~~~~~~~~~~~~~~~~~~~~~~~~~~~~~~~~~~~    Chief Complaint   Patient presents with    Medication Refill     Venlafaxine/ Metformin       History of Present Illness   Fadia Jewell is a 27 y.o. female who presents for:    Depression Review:  Patient is seen for depression [place] symptoms include:  none. Patient denies: anhedonia, decreased sleep, depressed mood and inappropriate guilt palpitations, shortness of breath, dizziness, psychomotor agitation, feelings of losing control, difficulty concentrating. Current treatment includes Effexor and no other therapies. Reported side effects from the treatment: none    PCOS  Patient present with cc of PCOS. Patient had been on metformin buts reports that she stopped due to lack of refills on her medication.  She is interested in resuming medication.   - SHe has not complete labs in >1 year.       Review of Systems   Review of Systems   Constitutional: Negative for chills and fever. HENT: Negative for congestion, ear discharge and sore throat. Eyes: Negative for double vision, photophobia and discharge. Respiratory: Negative for cough, sputum production, shortness of breath and wheezing. Cardiovascular: Negative for chest pain, palpitations and leg swelling. Gastrointestinal: Negative for diarrhea, nausea and vomiting. Genitourinary: Negative for dysuria and urgency. Skin: Negative. Neurological: Negative for dizziness, tremors and headaches. Psychiatric/Behavioral: Negative for depression, hallucinations, substance abuse and suicidal ideas. The patient is not nervous/anxious and does not have insomnia. Vitals/Objective:     Due to this being a TeleHealth evaluation, many elements of the physical examination are unable to be assessed. Physical Exam  Constitutional:       General: She is not in acute distress. Appearance: Normal appearance. She is obese. She is not ill-appearing, toxic-appearing or diaphoretic. HENT:      Head: Normocephalic and atraumatic. Right Ear: External ear normal.      Left Ear: External ear normal.      Mouth/Throat:      Mouth: Mucous membranes are moist.   Eyes:      General:         Right eye: No discharge. Left eye: No discharge. Extraocular Movements: Extraocular movements intact. Conjunctiva/sclera: Conjunctivae normal.   Neck:      Musculoskeletal: Normal range of motion. Pulmonary:      Effort: Pulmonary effort is normal.      Comments: No visualized signs of difficulty breathing or respiratory distress  Skin:     Coloration: Skin is not pale. Findings: No erythema or rash. Neurological:      Mental Status: She is alert and oriented to person, place, and time. Cranial Nerves: No cranial nerve deficit ( No Facial Asymmetry (Cranial nerve 7 motor function) (limited exam due to video visit) ). Comments: Able to follow commands    Psychiatric:         Mood and Affect: Mood normal.         Behavior: Behavior normal.          No results found for this or any previous visit (from the past 24 hour(s)). Disposition     Follow-up and Dispositions  ·   Return in about 6 months (around 11/22/2020) for Routine (Chronic Conditions). No future appointments. History   Patient's past medical, surgical and family histories were reviewed and updated.     Past Medical History:   Diagnosis Date    Depression     Eczema     PCOS (polycystic ovarian syndrome)      Past Surgical History:   Procedure Laterality Date    HX ADENOIDECTOMY  2001    HX TONSILLECTOMY  2010    HX WISDOM TEETH EXTRACTION       Family History   Problem Relation Age of Onset    Alcohol abuse Father     Diabetes Maternal Grandfather     Other Maternal Grandmother         macular degeneration    Cancer Maternal Grandmother         skin    Depression Mother     Atopy Sister     No Known Problems Brother     No Known Problems Paternal Grandmother     No Known Problems Paternal Grandfather     Heart Disease Neg Hx     Hypertension Neg Hx      Social History     Tobacco Use    Smoking status: Current Every Day Smoker     Packs/day: 0.25     Years: 1.00     Pack years: 0.25     Types: Cigarettes    Smokeless tobacco: Never Used   Substance Use Topics    Alcohol use: No    Drug use: No       Allergies     Allergies   Allergen Reactions    Sulfa (Sulfonamide Antibiotics) Anaphylaxis, Hives and Shortness of Breath

## 2020-05-30 LAB
BUN SERPL-MCNC: 6 MG/DL (ref 6–20)
BUN/CREAT SERPL: 7 (ref 9–23)
CALCIUM SERPL-MCNC: 9.4 MG/DL (ref 8.7–10.2)
CHLORIDE SERPL-SCNC: 99 MMOL/L (ref 96–106)
CHOLEST SERPL-MCNC: 158 MG/DL (ref 100–199)
CO2 SERPL-SCNC: 21 MMOL/L (ref 20–29)
CREAT SERPL-MCNC: 0.81 MG/DL (ref 0.57–1)
EST. AVERAGE GLUCOSE BLD GHB EST-MCNC: 105 MG/DL
GLUCOSE SERPL-MCNC: 95 MG/DL (ref 65–99)
HBA1C MFR BLD: 5.3 % (ref 4.8–5.6)
HDLC SERPL-MCNC: 71 MG/DL
LDLC SERPL CALC-MCNC: 59 MG/DL (ref 0–99)
POTASSIUM SERPL-SCNC: 4.3 MMOL/L (ref 3.5–5.2)
SODIUM SERPL-SCNC: 140 MMOL/L (ref 134–144)
TRIGL SERPL-MCNC: 138 MG/DL (ref 0–149)
TSH SERPL DL<=0.005 MIU/L-ACNC: 1.04 UIU/ML (ref 0.45–4.5)
VLDLC SERPL CALC-MCNC: 28 MG/DL (ref 5–40)

## 2020-10-05 ENCOUNTER — VIRTUAL VISIT (OUTPATIENT)
Dept: FAMILY MEDICINE CLINIC | Age: 25
End: 2020-10-05
Payer: MEDICAID

## 2020-10-05 DIAGNOSIS — R11.2 NAUSEA AND VOMITING, INTRACTABILITY OF VOMITING NOT SPECIFIED, UNSPECIFIED VOMITING TYPE: Primary | ICD-10-CM

## 2020-10-05 PROCEDURE — 99213 OFFICE O/P EST LOW 20 MIN: CPT | Performed by: FAMILY MEDICINE

## 2020-10-05 RX ORDER — FAMOTIDINE 40 MG/1
40 TABLET, FILM COATED ORAL 2 TIMES DAILY
Qty: 180 TAB | Refills: 0 | Status: SHIPPED | OUTPATIENT
Start: 2020-10-05 | End: 2020-10-08 | Stop reason: RX

## 2020-10-05 NOTE — PROGRESS NOTES
Don Fajardo  23 y.o. female  1995  [id number]    [facility] FAMILY MEDICINE  Progress Note     Encounter Date: 10/5/2020    Don Fajardo is a 23 y.o. female who was seen by synchronous (real-time) audio-video technology on 10/5/2020. She and/or her healthcare decision maker is aware that this patient-initiated Telehealth encounter is a billable service, with coverage as determined by her insurance carrier. She  is aware that she may receive a bill and has provided verbal consent to proceed:Yes    I was at home while conducting this encounter. The patient was at home during the encounter. This visit was completed virtually using Doxy. me  Assessment and Plan:     Encounter Diagnoses     ICD-10-CM ICD-9-CM   1. Nausea and vomiting, intractability of vomiting not specified, unspecified vomiting type  R11.2 787.01       1. Nausea and vomiting, intractability of vomiting not specified, unspecified vomiting type  Patient to start pepcid for N/V. Monitor symptoms if no improvement in 1 week, follow up in office.   - famotidine (PEPCID) 40 mg tablet; Take 1 Tab by mouth two (2) times a day. Dispense: 180 Tab; Refill: 0      We discussed the expected course, resolution and complications of the diagnosis(es) in detail. Medication risks, benefits, costs, interactions, and alternatives were discussed as indicated. I advised her to contact the office if her condition worsens, changes or fails to improve as anticipated. She expressed understanding with the diagnosis(es) and plan.      CPT Codes 19735-94588 for Established Patients may apply to this Telehealth Visit    Pursuant to the emergency declaration under the [facility], [facility] waiver authority and the [employer] General Act, this Virtual  Visit was conducted, with patient's consent, to reduce the patient's risk of exposure to COVID-19 and provide continuity of care for an established patient. Services were provided through a video synchronous discussion virtually to substitute for in-person clinic visit. Electronically Signed: Komal Macedo MD    Current Medications after this visit     Current Outpatient Medications   Medication Sig    famotidine (PEPCID) 40 mg tablet Take 1 Tab by mouth two (2) times a day.  venlafaxine (EFFEXOR) 75 mg tablet Take 1 Tab by mouth three (3) times daily.  metFORMIN ER (GLUCOPHAGE XR) 500 mg tablet Take 1 Tab by mouth daily (with dinner). No current facility-administered medications for this visit. There are no discontinued medications. ~~~~~~~~~~~~~~~~~~~~~~~~~~~~~~~~~~~~~~~~~~~~~~    Chief Complaint   Patient presents with    Nausea    Vomiting       History of Present Illness   Samia Grace is a 23 y.o. female who presents for:    N/V  Patient present with cc of nausea and vomiting x 2-3 weeks. Patient reports that she has checked an at home pregnancy test which was negative and then she had her period. Symptoms are worse in the morning and present immediately upon awakening. She has a history of GERD which has been improving. Review of Systems   Review of Systems   Constitutional: Positive for chills. Negative for fever, malaise/fatigue and weight loss. Gastrointestinal: Positive for diarrhea, nausea and vomiting. Negative for abdominal pain, blood in stool and constipation. Genitourinary: Negative for dysuria, frequency, hematuria and urgency. Skin: Negative for itching and rash. Neurological: Negative for dizziness, sensory change, speech change, focal weakness, weakness and headaches. Vitals/Objective:     Due to this being a TeleHealth evaluation, many elements of the physical examination are unable to be assessed. Physical Exam  Constitutional:       General: She is not in acute distress. Appearance: Normal appearance. She is obese.  She is not ill-appearing, toxic-appearing or diaphoretic. HENT:      Head: Normocephalic and atraumatic. Right Ear: External ear normal.      Left Ear: External ear normal.      Mouth/Throat:      Mouth: Mucous membranes are moist.   Eyes:      General:         Right eye: No discharge. Left eye: No discharge. Extraocular Movements: Extraocular movements intact. Conjunctiva/sclera: Conjunctivae normal.   Neck:      Musculoskeletal: Normal range of motion. Pulmonary:      Effort: Pulmonary effort is normal.      Comments: No visualized signs of difficulty breathing or respiratory distress  Skin:     Coloration: Skin is not pale. Findings: No erythema or rash. Neurological:      Mental Status: She is alert and oriented to person, place, and time. Cranial Nerves: No cranial nerve deficit ( No Facial Asymmetry (Cranial nerve 7 motor function) (limited exam due to video visit) ). Comments: Able to follow commands    Psychiatric:         Mood and Affect: Mood normal.         Behavior: Behavior normal.         No results found for this or any previous visit (from the past 24 hour(s)). Disposition     Follow-up and Dispositions  ·   Return if symptoms worsen or fail to improve. No future appointments. History   Patient's past medical, surgical and family histories were reviewed and updated.     Past Medical History:   Diagnosis Date    Depression     Eczema     PCOS (polycystic ovarian syndrome)      Past Surgical History:   Procedure Laterality Date    HX ADENOIDECTOMY  2001    HX TONSILLECTOMY  2010    HX WISDOM TEETH EXTRACTION       Family History   Problem Relation Age of Onset    Alcohol abuse Father     Diabetes Maternal Grandfather     Other Maternal Grandmother         macular degeneration    Cancer Maternal Grandmother         skin    Depression Mother     Atopy Sister     No Known Problems Brother     No Known Problems Paternal Grandmother     No Known Problems Paternal Grandfather     Heart Disease Neg Hx     Hypertension Neg Hx      Social History     Tobacco Use    Smoking status: Current Every Day Smoker     Packs/day: 0.25     Years: 1.00     Pack years: 0.25     Types: Cigarettes    Smokeless tobacco: Never Used   Substance Use Topics    Alcohol use: No    Drug use: No       Allergies     Allergies   Allergen Reactions    Sulfa (Sulfonamide Antibiotics) Anaphylaxis, Hives and Shortness of Breath

## 2020-10-07 ENCOUNTER — TELEPHONE (OUTPATIENT)
Dept: FAMILY MEDICINE CLINIC | Age: 25
End: 2020-10-07

## 2020-10-07 DIAGNOSIS — R11.2 NAUSEA AND VOMITING, INTRACTABILITY OF VOMITING NOT SPECIFIED, UNSPECIFIED VOMITING TYPE: Primary | ICD-10-CM

## 2020-10-07 NOTE — TELEPHONE ENCOUNTER
Pt reached out to pharmacy and the PEPCID is on back order.     She would like an alternate to be sent in to [facility] at [facility]

## 2020-10-08 RX ORDER — ZINC GLUCONATE 13.3 MG
400 LOZENGE ORAL 2 TIMES DAILY
Qty: 180 TAB | Refills: 0 | Status: SHIPPED | OUTPATIENT
Start: 2020-10-08 | End: 2020-12-16

## 2020-10-09 ENCOUNTER — TELEPHONE (OUTPATIENT)
Dept: FAMILY MEDICINE CLINIC | Age: 25
End: 2020-10-09

## 2020-10-09 NOTE — LETTER
10/9/2020 5:29 PM 
 
Ms. Don Fajardo [address] [id number] To Whom It May Concern: 
 
Don Fajardo is currently under the care of 1 Laurie Darnell. She was seen by the office on 10/5/2020. If there are questions or concerns please have the patient contact our office.  
 
 
 
Sincerely, 
 
 
Shamir Irwin MD

## 2020-12-16 ENCOUNTER — VIRTUAL VISIT (OUTPATIENT)
Dept: FAMILY MEDICINE CLINIC | Age: 25
End: 2020-12-16
Payer: MEDICAID

## 2020-12-16 DIAGNOSIS — F32.5 MAJOR DEPRESSIVE DISORDER WITH SINGLE EPISODE, IN FULL REMISSION (HCC): ICD-10-CM

## 2020-12-16 PROCEDURE — 99213 OFFICE O/P EST LOW 20 MIN: CPT | Performed by: FAMILY MEDICINE

## 2020-12-16 RX ORDER — VENLAFAXINE 75 MG/1
75 TABLET ORAL 3 TIMES DAILY
Qty: 90 TAB | Refills: 5 | Status: SHIPPED | OUTPATIENT
Start: 2020-12-16 | End: 2021-12-10 | Stop reason: DRUGHIGH

## 2020-12-16 NOTE — PROGRESS NOTES
Identified pt with two pt identifiers(name and ). Reviewed record in preparation for visit and have obtained necessary documentation. Chief Complaint   Patient presents with    Medication Refill        Health Maintenance Due   Topic    Pneumococcal 0-64 years (1 of 1 - PPSV23)    HPV Age 9Y-34Y (1 - 2-dose series)    DTaP/Tdap/Td series (1 - Tdap)    PAP AKA CERVICAL CYTOLOGY     Flu Vaccine (1)       Coordination of Care Questionnaire:  :   1) Have you been to an emergency room, urgent care, or hospitalized since your last visit? If yes, where when, and reason for visit? no      2. Have seen or consulted any other health care provider since your last visit? If yes, where when, and reason for visit?  no        Patient is accompanied by self I have received verbal consent from Ehsan Johns to discuss any/all medical information while they are present in the room.

## 2020-12-16 NOTE — PROGRESS NOTES
Jose Angulo  23 y.o. female  1995  CZW:[id number]    [facility] FAMILY MEDICINE  Progress Note     Encounter Date: 12/16/2020    Jose Angulo is a 23 y.o. female who was seen by synchronous (real-time) audio-video technology on 12/16/2020. She and/or her healthcare decision maker is aware that this patient-initiated Telehealth encounter is a billable service, with coverage as determined by her insurance carrier. She  is aware that she may receive a bill and has provided verbal consent to proceed:Yes    I was in the office while conducting this encounter. The patient was checked in/\"roomed\" by Parish Ellsworth CMA via telephone in the office. The patient was at home during the encounter. This visit was completed virtually using Doximity  Assessment and Plan:     Encounter Diagnoses     ICD-10-CM ICD-9-CM   1. Major depressive disorder with single episode, in full remission ([facility].)  F32.5 296.26       1. Major depressive disorder with single episode, in full remission [facility])  Patient declined weaning trials at this time. Continue current medication. - venlafaxine (EFFEXOR) 75 mg tablet; Take 1 Tab by mouth three (3) times daily. Dispense: 90 Tab; Refill: 5      We discussed the expected course, resolution and complications of the diagnosis(es) in detail. Medication risks, benefits, costs, interactions, and alternatives were discussed as indicated. I advised her to contact the office if her condition worsens, changes or fails to improve as anticipated. She expressed understanding with the diagnosis(es) and plan.      CPT Codes 62445-65902 for Established Patients may apply to this Telehealth Visit    Pursuant to the emergency declaration under the [facility], 1135 waiver authority and the [employer] General Act, this Virtual  Visit was conducted, with patient's consent, to reduce the patient's risk of exposure to COVID-19 and provide continuity of care for an established patient. Services were provided through a video synchronous discussion virtually to substitute for in-person clinic visit. Electronically Signed: Rafa Sifuentes MD    Current Medications after this visit     Current Outpatient Medications   Medication Sig    venlafaxine (EFFEXOR) 75 mg tablet Take 1 Tab by mouth three (3) times daily. No current facility-administered medications for this visit. Medications Discontinued During This Encounter   Medication Reason    cimetidine (TAGAMET) 200 mg tab Not A Current Medication    metFORMIN ER (GLUCOPHAGE XR) 500 mg tablet Not A Current Medication    venlafaxine (EFFEXOR) 75 mg tablet REORDER     ~~~~~~~~~~~~~~~~~~~~~~~~~~~~~~~~~~~~~~~~~~~~~~    Chief Complaint   Patient presents with    Medication Refill       History of Present Illness   Agatha Kaur is a 23 y.o. female who presents for:    Depression Review:  Patient is seen for depression. Ongoig symptoms include: none. Patient denies: anhedonia, decreased sleep, depressed mood, poor concentration and psychomotor agitation psychomotor agitation, feelings of losing control, difficulty concentrating. Current treatment includes Effexor and no other therapies. Reported side effects from the treatment: none    Review of Systems   Review of Systems   Constitutional: Negative for chills and fever. HENT: Negative for congestion, ear discharge and sore throat. Eyes: Negative for double vision, photophobia and discharge. Respiratory: Negative for cough, sputum production, shortness of breath and wheezing. Cardiovascular: Negative for chest pain, palpitations and leg swelling. Gastrointestinal: Negative for diarrhea, nausea and vomiting. Genitourinary: Negative for dysuria and urgency. Skin: Negative. Neurological: Negative for dizziness, tremors and headaches.    Psychiatric/Behavioral: Negative for depression, hallucinations and suicidal ideas. The patient is not nervous/anxious and does not have insomnia. Vitals/Objective:     Due to this being a TeleHealth evaluation, many elements of the physical examination are unable to be assessed. Physical Exam  Constitutional:       General: She is not in acute distress. Appearance: Normal appearance. She is not ill-appearing. HENT:      Head: Normocephalic and atraumatic. Right Ear: External ear normal.      Left Ear: External ear normal.      Mouth/Throat:      Mouth: Mucous membranes are moist.   Eyes:      General:         Right eye: No discharge. Left eye: No discharge. Extraocular Movements: Extraocular movements intact. Conjunctiva/sclera: Conjunctivae normal.   Neck:      Musculoskeletal: Normal range of motion. Pulmonary:      Effort: Pulmonary effort is normal.      Comments: No visualized signs of difficulty breathing or respiratory distress  Skin:     Coloration: Skin is not pale. Findings: No erythema or rash. Neurological:      Mental Status: She is alert and oriented to person, place, and time. Cranial Nerves: No cranial nerve deficit ( No Facial Asymmetry (Cranial nerve 7 motor function) (limited exam due to video visit) ). Comments: Able to follow commands    Psychiatric:         Mood and Affect: Mood normal.         Behavior: Behavior normal.          No results found for this or any previous visit (from the past 24 hour(s)). Disposition     Follow-up and Dispositions  ·   Return in about 6 months (around 6/16/2021) for Routine (Chronic Conditions). No future appointments. History   Patient's past medical, surgical and family histories were reviewed and updated.     Past Medical History:   Diagnosis Date    Depression     Eczema     PCOS (polycystic ovarian syndrome)      Past Surgical History:   Procedure Laterality Date    HX ADENOIDECTOMY  2001    HX TONSILLECTOMY  2010    HX WISDOM TEETH EXTRACTION       Family History   Problem Relation Age of Onset    Alcohol abuse Father     Diabetes Maternal Grandfather     Other Maternal Grandmother         macular degeneration    Cancer Maternal Grandmother         skin    Depression Mother     Atopy Sister     No Known Problems Brother     No Known Problems Paternal Grandmother     No Known Problems Paternal Grandfather     Heart Disease Neg Hx     Hypertension Neg Hx      Social History     Tobacco Use    Smoking status: Current Every Day Smoker     Packs/day: 0.25     Years: 1.00     Pack years: 0.25     Types: Cigarettes    Smokeless tobacco: Never Used   Substance Use Topics    Alcohol use: No    Drug use: No       Allergies     Allergies   Allergen Reactions    Sulfa (Sulfonamide Antibiotics) Anaphylaxis, Hives and Shortness of Breath

## 2021-01-12 ENCOUNTER — VIRTUAL VISIT (OUTPATIENT)
Dept: FAMILY MEDICINE CLINIC | Age: 26
End: 2021-01-12
Payer: MEDICAID

## 2021-01-12 DIAGNOSIS — N30.00 ACUTE CYSTITIS WITHOUT HEMATURIA: Primary | ICD-10-CM

## 2021-01-12 PROCEDURE — 99213 OFFICE O/P EST LOW 20 MIN: CPT | Performed by: FAMILY MEDICINE

## 2021-01-12 RX ORDER — NITROFURANTOIN 25; 75 MG/1; MG/1
100 CAPSULE ORAL 2 TIMES DAILY
Qty: 10 CAP | Refills: 0 | Status: SHIPPED | OUTPATIENT
Start: 2021-01-12 | End: 2021-01-17

## 2021-01-12 NOTE — PROGRESS NOTES
Dinora Davey  23 y.o. female  1995  RTE:[id number]    [facility]  [facility]     Encounter Date: 1/12/2021    Dinora Davey is a 23 y.o. female who was seen by synchronous (real-time) audio-video technology on 1/12/2021. She and/or her healthcare decision maker is aware that this patient-initiated Telehealth encounter is a billable service, with coverage as determined by her insurance carrier. She  is aware that she may receive a bill and has provided verbal consent to proceed:Yes    I was in the office while conducting this encounter. The patient was checked in/\"roomed\" by Rodri Hernandez CMA via telephone in the office. The patient was at home during the encounter. This visit was completed virtually using Doxy. me  Assessment and Plan:     Encounter Diagnoses     ICD-10-CM ICD-9-CM   1. Acute cystitis without hematuria  N30.00 595.0       1. Acute cystitis without hematuria  - nitrofurantoin, macrocrystal-monohydrate, (Macrobid) 100 mg capsule; Take 1 Cap by mouth two (2) times a day for 5 days. Dispense: 10 Cap; Refill: 0      We discussed the expected course, resolution and complications of the diagnosis(es) in detail. Medication risks, benefits, costs, interactions, and alternatives were discussed as indicated. I advised her to contact the office if her condition worsens, changes or fails to improve as anticipated. She expressed understanding with the diagnosis(es) and plan. CPT Codes 59849-60517 for Established Patients may apply to this Telehealth Visit    Pursuant to the emergency declaration under the [facility] waiver authority and the [employer] Act, this Virtual  Visit was conducted, with patient's consent, to reduce the patient's risk of exposure to COVID-19 and provide continuity of care for an established patient.      Services were provided through a video synchronous discussion virtually to substitute for in-person clinic visit. Electronically Signed: Martha Velarde MD    Current Medications after this visit     Current Outpatient Medications   Medication Sig    nitrofurantoin, macrocrystal-monohydrate, (Macrobid) 100 mg capsule Take 1 Cap by mouth two (2) times a day for 5 days.  venlafaxine (EFFEXOR) 75 mg tablet Take 1 Tab by mouth three (3) times daily. No current facility-administered medications for this visit. There are no discontinued medications. ~~~~~~~~~~~~~~~~~~~~~~~~~~~~~~~~~~~~~~~~~~~~~~    Chief Complaint   Patient presents with    Urinary Frequency     For x2 day; increased frequency occured, experienced discomfort        History of Present Illness   Tylor Austin is a 23 y.o. female who presents for:    UTI? Patient present with cc of dysuria, frequency, urgency, suprapubic pressure for 3 days. Patient denies flank pain, fever. Patient does not have a history of recurrent UTI. Patient does not have a history of pyelonephritis. Evaluation to date: none. Treatment to date: none. Review of Systems   Review of Systems   Gastrointestinal: Positive for abdominal pain and vomiting. Negative for blood in stool, constipation, diarrhea and nausea. Genitourinary: Positive for dysuria, frequency and urgency. Negative for flank pain and hematuria. Neurological: Negative for dizziness, focal weakness, seizures, weakness and headaches. Vitals/Objective:     Due to this being a TeleHealth evaluation, many elements of the physical examination are unable to be assessed. Physical Exam  Constitutional:       General: She is not in acute distress. Appearance: Normal appearance. She is not ill-appearing or diaphoretic. HENT:      Head: Normocephalic and atraumatic.       Right Ear: External ear normal.      Left Ear: External ear normal.      Mouth/Throat:      Mouth: Mucous membranes are moist.   Eyes: General:         Right eye: No discharge. Left eye: No discharge. Extraocular Movements: Extraocular movements intact. Conjunctiva/sclera: Conjunctivae normal.   Neck:      Musculoskeletal: Normal range of motion. Pulmonary:      Effort: Pulmonary effort is normal.      Comments: No visualized signs of difficulty breathing or respiratory distress  Skin:     Coloration: Skin is not pale. Findings: No erythema or rash. Neurological:      Mental Status: She is alert and oriented to person, place, and time. Cranial Nerves: No cranial nerve deficit ( No Facial Asymmetry (Cranial nerve 7 motor function) (limited exam due to video visit) ). Comments: Able to follow commands    Psychiatric:         Mood and Affect: Mood normal.         Behavior: Behavior normal.          No results found for this or any previous visit (from the past 24 hour(s)). Disposition     No future appointments. History   Patient's past medical, surgical and family histories were reviewed and updated.     Past Medical History:   Diagnosis Date    Depression     Eczema     PCOS (polycystic ovarian syndrome)      Past Surgical History:   Procedure Laterality Date    HX ADENOIDECTOMY  2001    HX TONSILLECTOMY  2010    HX WISDOM TEETH EXTRACTION       Family History   Problem Relation Age of Onset    Alcohol abuse Father     Diabetes Maternal Grandfather     Other Maternal Grandmother         macular degeneration    Cancer Maternal Grandmother         skin    Depression Mother     Atopy Sister     No Known Problems Brother     No Known Problems Paternal Grandmother     No Known Problems Paternal Grandfather     Heart Disease Neg Hx     Hypertension Neg Hx      Social History     Tobacco Use    Smoking status: Current Every Day Smoker     Packs/day: 0.25     Years: 1.00     Pack years: 0.25     Types: Cigarettes    Smokeless tobacco: Never Used   Substance Use Topics    Alcohol use: No    Drug use: No       Allergies     Allergies   Allergen Reactions    Sulfa (Sulfonamide Antibiotics) Anaphylaxis, Hives and Shortness of Breath

## 2021-01-12 NOTE — PROGRESS NOTES
Identified pt with two pt identifiers(name and ). Reviewed record in preparation for visit and have obtained necessary documentation. Chief Complaint   Patient presents with    Urinary Frequency     For x2 day; increased frequency occured, experienced discomfort         Health Maintenance Due   Topic    Pneumococcal 0-64 years (1 of 1 - PPSV23)    HPV Age 9Y-34Y (1 - 2-dose series)    DTaP/Tdap/Td series (1 - Tdap)    PAP AKA CERVICAL CYTOLOGY        Coordination of Care Questionnaire:  :   1) Have you been to an emergency room, urgent care, or hospitalized since your last visit? If yes, where when, and reason for visit? no       2. Have seen or consulted any other health care provider since your last visit? If yes, where when, and reason for visit?  no        Patient is accompanied by self I have received verbal consent from Allyson Carpenter to discuss any/all medical information while they are present in the room.

## 2021-01-18 ENCOUNTER — TELEPHONE (OUTPATIENT)
Dept: FAMILY MEDICINE CLINIC | Age: 26
End: 2021-01-18

## 2021-01-18 NOTE — TELEPHONE ENCOUNTER
Please advise     You sent in on 12/16/2020 90 tab with 5 refills     ----- Message from Gia Sorenesn sent at 1/15/2021 11:18 AM EST -----  Regarding: Dr. Cody Garcia Refill  Contact: [phone]  Medication Refill    Caller (if not patient):pt      Relationship of caller (if not patient): pt      Best contact number(s):[phone]      Name of medication and dosage if known: \"venlafaxine\"      Is patient out of this medication (yes/no):yes      Pharmacy name:[facility]    Pharmacy listed in chart? (yes/no):yes  Pharmacy phone number:unknown      Details to clarify the request: Pt doesn't want to go into withdrawls      Gia Sorensen

## 2021-01-18 NOTE — TELEPHONE ENCOUNTER
Called pharmacy. States that patient picked up medication on 1/16/2021 and refills are appropriate.      Ashley Lr MD

## 2021-02-09 ENCOUNTER — VIRTUAL VISIT (OUTPATIENT)
Dept: FAMILY MEDICINE CLINIC | Age: 26
End: 2021-02-09

## 2021-02-09 DIAGNOSIS — N12 PYELONEPHRITIS: Primary | ICD-10-CM

## 2021-02-09 PROCEDURE — 99214 OFFICE O/P EST MOD 30 MIN: CPT | Performed by: FAMILY MEDICINE

## 2021-02-09 RX ORDER — LEVOFLOXACIN 750 MG/1
750 TABLET ORAL DAILY
Qty: 7 TAB | Refills: 0 | Status: SHIPPED | OUTPATIENT
Start: 2021-02-09 | End: 2021-02-16

## 2021-02-09 NOTE — PROGRESS NOTES
Sammy Busby  23 y.o. female  1995  MR:[id number]    [facility]  Progress Note     Encounter Date: 2/9/2021    Sammy Busby is a 23 y.o. female who was seen by synchronous (real-time) audio-video technology on 2/9/2021. She and/or her healthcare decision maker is aware that this patient-initiated Telehealth encounter is a billable service, with coverage as determined by her insurance carrier. She  is aware that she may receive a bill and has provided verbal consent to proceed:Yes    I was at home while conducting this encounter. The patient was at home during the encounter. This visit was completed virtually using Doxy. me  Assessment and Plan:     Encounter Diagnoses     ICD-10-CM ICD-9-CM   1. Pyelonephritis  N12 590.80       1. Pyelonephritis  Patient had been treated for UTI last month. Treat with fluoroquinolone. - levoFLOXacin (LEVAQUIN) 750 mg tablet; Take 1 Tab by mouth daily for 7 days. Dispense: 7 Tab; Refill: 0      We discussed the expected course, resolution and complications of the diagnosis(es) in detail. Medication risks, benefits, costs, interactions, and alternatives were discussed as indicated. I advised her to contact the office if her condition worsens, changes or fails to improve as anticipated. She expressed understanding with the diagnosis(es) and plan. CPT Codes 59344-23946 for Established Patients may apply to this Telehealth Visit    Pursuant to the emergency declaration under the [facility], [facility] waiver authority and the [employer] General Act, this Virtual  Visit was conducted, with patient's consent, to reduce the patient's risk of exposure to COVID-19 and provide continuity of care for an established patient. Services were provided through a video synchronous discussion virtually to substitute for in-person clinic visit.     Electronically Signed: Corey Alarcon MD    Current Medications after this visit     Current Outpatient Medications   Medication Sig    levoFLOXacin (LEVAQUIN) 750 mg tablet Take 1 Tab by mouth daily for 7 days.  venlafaxine (EFFEXOR) 75 mg tablet Take 1 Tab by mouth three (3) times daily. No current facility-administered medications for this visit. There are no discontinued medications. ~~~~~~~~~~~~~~~~~~~~~~~~~~~~~~~~~~~~~~~~~~~~~~    Chief Complaint   Patient presents with    Bladder Infection       History of Present Illness   Jessica Chou is a 23 y.o. female who presents for:    Kidney infection  Patient present with cc of dysuria, frequency, urgency, nausea, vomiting, bilaterally left worsen then right flank pain for 2 days. Patient denies fever, pelvic pain. Patient does not have a history of recurrent UTI. Patient does not have a history of pyelonephritis. Evaluation to date: she had been treated for UTI 1/12/2021. Treatment to date: antibiotics -macrobid. Began taking several weeks ago She reports complete resolution of symptoms at that time. Review of Systems   Review of Systems   Constitutional: Negative for chills and fever. Gastrointestinal: Positive for abdominal pain, nausea and vomiting. Negative for blood in stool, constipation and diarrhea. Genitourinary: Positive for dysuria, flank pain, frequency and urgency. Negative for hematuria. Neurological: Negative for dizziness. Vitals/Objective:     Due to this being a TeleHealth evaluation, many elements of the physical examination are unable to be assessed. Physical Exam  Constitutional:       General: She is not in acute distress. Appearance: Normal appearance. She is not ill-appearing, toxic-appearing or diaphoretic. HENT:      Head: Normocephalic and atraumatic.       Right Ear: External ear normal.      Left Ear: External ear normal.      Mouth/Throat:      Mouth: Mucous membranes are moist.   Eyes: General:         Right eye: No discharge. Left eye: No discharge. Extraocular Movements: Extraocular movements intact. Conjunctiva/sclera: Conjunctivae normal.   Neck:      Musculoskeletal: Normal range of motion. Pulmonary:      Effort: Pulmonary effort is normal.      Comments: No visualized signs of difficulty breathing or respiratory distress  Skin:     Coloration: Skin is not pale. Findings: No erythema or rash. Neurological:      Mental Status: She is alert and oriented to person, place, and time. Cranial Nerves: No cranial nerve deficit ( No Facial Asymmetry (Cranial nerve 7 motor function) (limited exam due to video visit) ). Comments: Able to follow commands    Psychiatric:         Mood and Affect: Mood normal.         Behavior: Behavior normal.         No results found for this or any previous visit (from the past 24 hour(s)). Disposition     No future appointments. History   Patient's past medical, surgical and family histories were reviewed and updated.     Past Medical History:   Diagnosis Date    Depression     Eczema     PCOS (polycystic ovarian syndrome)      Past Surgical History:   Procedure Laterality Date    HX ADENOIDECTOMY  2001    HX TONSILLECTOMY  2010    HX WISDOM TEETH EXTRACTION       Family History   Problem Relation Age of Onset    Alcohol abuse Father     Diabetes Maternal Grandfather     Other Maternal Grandmother         macular degeneration    Cancer Maternal Grandmother         skin    Depression Mother     Atopy Sister     No Known Problems Brother     No Known Problems Paternal Grandmother     No Known Problems Paternal Grandfather     Heart Disease Neg Hx     Hypertension Neg Hx      Social History     Tobacco Use    Smoking status: Current Every Day Smoker     Packs/day: 0.25     Years: 1.00     Pack years: 0.25     Types: Cigarettes    Smokeless tobacco: Never Used   Substance Use Topics    Alcohol use: No    Drug use: No       Allergies     Allergies   Allergen Reactions    Sulfa (Sulfonamide Antibiotics) Anaphylaxis, Hives and Shortness of Breath

## 2021-12-10 ENCOUNTER — VIRTUAL VISIT (OUTPATIENT)
Dept: FAMILY MEDICINE CLINIC | Age: 26
End: 2021-12-10
Payer: OTHER GOVERNMENT

## 2021-12-10 DIAGNOSIS — E53.8 FOLATE DEFICIENCY: ICD-10-CM

## 2021-12-10 DIAGNOSIS — D64.9 ANEMIA, UNSPECIFIED TYPE: ICD-10-CM

## 2021-12-10 DIAGNOSIS — K70.10 ALCOHOLIC HEPATITIS WITHOUT ASCITES: ICD-10-CM

## 2021-12-10 DIAGNOSIS — G62.9 NEUROPATHY: ICD-10-CM

## 2021-12-10 DIAGNOSIS — D69.6 THROMBOCYTOPENIA (HCC): ICD-10-CM

## 2021-12-10 DIAGNOSIS — F32.A DEPRESSION, UNSPECIFIED DEPRESSION TYPE: ICD-10-CM

## 2021-12-10 DIAGNOSIS — F90.9 ATTENTION DEFICIT HYPERACTIVITY DISORDER (ADHD), UNSPECIFIED ADHD TYPE: Primary | ICD-10-CM

## 2021-12-10 DIAGNOSIS — R41.840 ATTENTION OR CONCENTRATION DEFICIT: ICD-10-CM

## 2021-12-10 DIAGNOSIS — D68.9 COAGULOPATHY (HCC): ICD-10-CM

## 2021-12-10 DIAGNOSIS — E87.6 HYPOKALEMIA: ICD-10-CM

## 2021-12-10 PROCEDURE — 99204 OFFICE O/P NEW MOD 45 MIN: CPT | Performed by: STUDENT IN AN ORGANIZED HEALTH CARE EDUCATION/TRAINING PROGRAM

## 2021-12-10 RX ORDER — SPIRONOLACTONE 25 MG/1
TABLET ORAL
Qty: 90 TABLET | Refills: 0 | Status: SHIPPED | OUTPATIENT
Start: 2021-12-10 | End: 2022-05-13

## 2021-12-10 RX ORDER — FUROSEMIDE 40 MG/1
40 TABLET ORAL DAILY
Qty: 90 TABLET | Refills: 0 | Status: SHIPPED | OUTPATIENT
Start: 2021-12-10 | End: 2022-05-13

## 2021-12-10 RX ORDER — ERGOCALCIFEROL 1.25 MG/1
CAPSULE ORAL
COMMUNITY
Start: 2021-09-27 | End: 2022-05-13

## 2021-12-10 RX ORDER — LIDOCAINE 50 MG/G
PATCH TOPICAL
COMMUNITY
Start: 2021-10-25 | End: 2022-01-24

## 2021-12-10 RX ORDER — FOLIC ACID 1 MG/1
1 TABLET ORAL DAILY
Qty: 90 TABLET | Refills: 1 | Status: SHIPPED | OUTPATIENT
Start: 2021-12-10 | End: 2022-03-15 | Stop reason: SDUPTHER

## 2021-12-10 RX ORDER — FUROSEMIDE 40 MG/1
40 TABLET ORAL DAILY
COMMUNITY
Start: 2021-10-11 | End: 2021-12-10 | Stop reason: SDUPTHER

## 2021-12-10 RX ORDER — FOLIC ACID 1 MG/1
1 TABLET ORAL DAILY
COMMUNITY
Start: 2021-09-27 | End: 2021-12-10 | Stop reason: SDUPTHER

## 2021-12-10 RX ORDER — GABAPENTIN 100 MG/1
100 CAPSULE ORAL 2 TIMES DAILY
COMMUNITY
Start: 2021-11-21 | End: 2021-12-10 | Stop reason: SDUPTHER

## 2021-12-10 RX ORDER — GABAPENTIN 100 MG/1
100 CAPSULE ORAL 2 TIMES DAILY
Qty: 180 CAPSULE | Refills: 0 | Status: SHIPPED | OUTPATIENT
Start: 2021-12-10 | End: 2022-03-03 | Stop reason: SDUPTHER

## 2021-12-10 RX ORDER — VENLAFAXINE HYDROCHLORIDE 150 MG/1
150 CAPSULE, EXTENDED RELEASE ORAL DAILY
Qty: 90 CAPSULE | Refills: 0 | Status: SHIPPED | OUTPATIENT
Start: 2021-12-10 | End: 2022-02-03 | Stop reason: SDUPTHER

## 2021-12-10 RX ORDER — SPIRONOLACTONE 25 MG/1
TABLET ORAL
COMMUNITY
Start: 2021-11-26 | End: 2021-12-10 | Stop reason: SDUPTHER

## 2021-12-10 RX ORDER — DICLOFENAC SODIUM 10 MG/G
GEL TOPICAL
COMMUNITY
Start: 2021-10-25 | End: 2022-01-24

## 2021-12-10 NOTE — PROGRESS NOTES
Chief Complaint   Patient presents with    Other     med refills (new patient)   1. Have you been to the ER, urgent care clinic since your last visit? Hospitalized since your last visit? No    2. Have you seen or consulted any other health care providers outside of the [address] [place] [address] since your last visit? Include any pap smears or colon screening. No  3 most recent PHQ Screens 12/10/2021   Little interest or pleasure in doing things More than half the days   Feeling down, depressed, irritable, or hopeless Several days   Total Score PHQ 2 3   Trouble falling or staying asleep, or sleeping too much Not at all   Feeling tired or having little energy More than half the days   Poor appetite, weight loss, or overeating Not at all   Feeling bad about yourself - or that you are a failure or have let yourself or your family down Several days   Trouble concentrating on things such as school, work, reading, or watching TV Nearly every day   Moving or speaking so slowly that other people could have noticed; or the opposite being so fidgety that others notice Not at all   Thoughts of being better off dead, or hurting yourself in some way Not at all   PHQ 9 Score 9   There were no vitals taken for this visit.

## 2021-12-10 NOTE — PROGRESS NOTES
Consent: Rosa Yang, who was seen by synchronous (real-time) audio-video technology, and/or her healthcare decision maker, is aware that this patient-initiated, Telehealth encounter on 12/10/2021 is a billable service, with coverage as determined by her insurance carrier. She is aware that she may receive a bill and has provided verbal consent to proceed: YES-Consent obtained within past 12 months        712  Subjective:   Rosa Yang is a 27 y.o. female who was seen for Other (med refills (new patient))      Presents to establish care, as well as multiple issues. Patient is having nausea and vomiting, and skin was yellow-colored so her wife took her to [facility]. Patient was recently admitted to [facility], and was found to have \"hepatitis\" secondary to alcohol abuse. She was found to be edematous on her legs and abdomen. She was discharged home on spironolactone, Lasix. She was also found to be hypokalemic and was discharged home on Klor-Con but has been unable to drink the tablets since they are so large. She was given Effer-K in the hospital she which she is able to tolerate. Discharged home on folate as well. Has not drank any alcohol since being discharged. Of notes on our chart show she has hepatitis C but she denies any knowledge of this. Review of recent labs is negative for hepatitis C antibody. Was found to be anemic while in the hospital and was treated with infusions. States she has developed neuropathy since stopping alcohol abuse. Has been placed on gabapentin. Currently 100 mg twice a day. History of depression. Needs refill of venlafaxine. Would like to be placed Effexor XR. Her previous insurance did not cover Effexor XR. Patient states she may have ADHD. She has difficulty concentrating and paying attention. She would like to be tested for ADHD. History of PCOS. Switch PCP due to insurance.         Prior to Admission medications Medication Sig Start Date End Date Taking? Authorizing Provider   venlafaxine (EFFEXOR) 75 mg tablet Take 1 Tab by mouth three (3) times daily. 12/16/20  Yes Marylu Flores MD   diclofenac (VOLTAREN) 1 % gel APPLY 1 GRAM TWICE DAILY AS DIRECTED  Patient not taking: Reported on 12/10/2021 10/25/21   Provider, Historical   Vitamin D2 1,250 mcg (50,000 unit) capsule take 1 capsule by mouth every week ON FRIDAY AT 9AM  Patient not taking: Reported on 12/10/2021 9/27/21   Provider, Historical   folic acid (FOLVITE) 1 mg tablet Take 1 mg by mouth daily. Patient not taking: Reported on 12/10/2021 9/27/21   Provider, Historical   furosemide (LASIX) 40 mg tablet Take 40 mg by mouth daily. Patient not taking: Reported on 12/10/2021 10/11/21   Provider, Historical   gabapentin (NEURONTIN) 100 mg capsule Take 100 mg by mouth two (2) times a day. Patient not taking: Reported on 12/10/2021 11/21/21   Provider, Historical   lidocaine (LIDODERM) 5 % apply 1 patch TO THE AFFECTED AREA DAILY. LEAVE ON FOR 12 HOURS AND THEN OFF FOR 12 HOURS. Patient not taking: Reported on 12/10/2021 10/25/21   Provider, Historical   spironolactone (ALDACTONE) 25 mg tablet take 2 tablets by mouth once daily as directed  Patient not taking: Reported on 12/10/2021 11/26/21   Provider, Historical     Allergies   Allergen Reactions    Sulfa (Sulfonamide Antibiotics) Anaphylaxis, Hives and Shortness of Breath     Patient Active Problem List    Diagnosis    Polycystic ovarian syndrome    Hepatitis C    Depression     Per records from [place] 10/03/2018: patient had been on Venlafaxine  for depression.  PCOS (polycystic ovarian syndrome)    Irregular periods     Past Medical History:   Diagnosis Date    Depression     Eczema     Hepatitis C     PCOS (polycystic ovarian syndrome)     Polycystic ovarian syndrome        Review of Systems   All other systems reviewed and are negative.       Objective:   Vital Signs: (As obtained by patient/caregiver at home)  There were no vitals taken for this visit. [INSTRUCTIONS:  \"[x]\" Indicates a positive item  \"[]\" Indicates a negative item  -- DELETE ALL ITEMS NOT EXAMINED]    Constitutional: [x] Appears well-developed and well-nourished [x] No apparent distress      [] Abnormal -     Mental status: [x] Alert and awake  [x] Oriented to person/place/time [x] Able to follow commands    [] Abnormal -     Eyes:   EOM    [x]  Normal    [] Abnormal -   Sclera  [x]  Normal    [] Abnormal -          Discharge [x]  None visible   [] Abnormal -     HENT: [x] Normocephalic, atraumatic  [] Abnormal -   [x] Mouth/Throat: Mucous membranes are moist    External Ears [x] Normal  [] Abnormal -    Neck: [x] No visualized mass [] Abnormal -     Pulmonary/Chest: [x] Respiratory effort normal   [x] No visualized signs of difficulty breathing or respiratory distress        [] Abnormal -        Neurological:        [x] No Facial Asymmetry (Cranial nerve 7 motor function) (limited exam due to video visit)          [x] No gaze palsy        [] Abnormal -          Skin:        [x] No significant exanthematous lesions or discoloration noted on facial skin         [] Abnormal -            Psychiatric:       [x] Normal Affect [] Abnormal -        [x] No Hallucinations    Other pertinent observable physical exam findings:-              Assessment & Plan:       ICD-10-CM ICD-9-CM    1. Attention deficit hyperactivity disorder (ADHD), unspecified ADHD type  F90.9 314.01    2. Depression, unspecified depression type  F32. A 311 venlafaxine-SR (EFFEXOR-XR) 150 mg capsule   3. Attention or concentration deficit  R41.840 799.51 REFERRAL TO PSYCHOLOGY   4. Neuropathy  G62.9 355.9 gabapentin (NEURONTIN) 100 mg capsule   5. Folate deficiency  N73.9 460.7 folic acid (FOLVITE) 1 mg tablet      FOLATE   6.  Alcoholic hepatitis without ascites  K70.10 571.1 furosemide (LASIX) 40 mg tablet      spironolactone (ALDACTONE) 25 mg tablet      LIPID PANEL   7. Hypokalemia  E87.6 276.8 potassium bicarb-citric acid (Effer-K) 20 mEq tablet      METABOLIC PANEL, COMPREHENSIVE   8. Anemia, unspecified type  D64.9 285.9 CBC WITH AUTOMATED DIFF   9. Thrombocytopenia (HCC)  D69.6 287.5    10. Coagulopathy ([address]  D68.9 286.9        Encounter to establish carediscussed past medical history and recent medical history as stated above    Hospital discharge follow-up-discussed hospital course including hypokalemia, alcoholic hepatitis, edema. Requesting records. Alcoholic hepatitispatient has stopped drinking. Discharged on spironolactone and Lasix. Continue current regimen for now. Scheduled to see Hepatologist in February, and gastroenterologist in January. Follow-up labs. Lower extremity Edemasecondary to alcoholic hepatitis. Treatment with spironolactone and Lasix stated above. Follow-up labs. Anemiafollow-up CBC. Folate deficiencyThakur refilled. Follow-up labs. Neuropathypossibly due to history of alcohol abuse. Gabapentin refilled. Depressiondiscontinue Venlaxafine IR, and started Venlaxafine  XR as requested by patient. Patient will be scheduled for labs only visit next week    Hypokalemiaeffervescent potassium supplement 20 mEq daily ordered. Follow-up labs. Mild thrombocytopenia and coagulopathy secondary to liver disease - received vitamin K while in the hospital.    Will make appointment to get labs done next week. Follow-up and Dispositions    · Return in about 2 months (around 2/10/2022) for ADHD screen. .           We discussed the expected course, resolution and complications of the diagnosis(es) in detail. Medication risks, benefits, costs, interactions, and alternatives were discussed as indicated. I advised her to contact the office if her condition worsens, changes or fails to improve as anticipated. She expressed understanding with the diagnosis(es) and plan.        Rea Forbes Aby Castaneda is a 27 y.o. female being evaluated by a video visit encounter for concerns as above. A caregiver was present when appropriate. Due to this being a TeleHealth encounter (During [facility] public health emergency), evaluation of the following organ systems was limited: Vitals/Constitutional/EENT/Resp/CV/GI//MS/Neuro/Skin/Heme-Lymph-Imm. Pursuant to the emergency declaration under the [address] [place] [address], [facility] waiver authority and the [employer] Act, this Virtual  Visit was conducted, with patient's (and/or legal guardian's) consent, to reduce the patient's risk of exposure to COVID-19 and provide necessary medical care. Services were provided through a video synchronous discussion virtually to substitute for in-person clinic visit. Patient and provider were located at their individual homes.         Monse Bland MD

## 2021-12-11 PROBLEM — G62.9 NEUROPATHY: Status: ACTIVE | Noted: 2021-12-11

## 2021-12-11 PROBLEM — K70.11 ALCOHOLIC HEPATITIS WITH ASCITES: Status: ACTIVE | Noted: 2021-12-11

## 2021-12-11 PROBLEM — R41.840 ATTENTION OR CONCENTRATION DEFICIT: Status: ACTIVE | Noted: 2021-12-11

## 2021-12-11 PROBLEM — D69.6 THROMBOCYTOPENIA (HCC): Status: ACTIVE | Noted: 2021-12-11

## 2021-12-11 PROBLEM — E53.8 FOLATE DEFICIENCY: Status: ACTIVE | Noted: 2021-12-11

## 2021-12-11 PROBLEM — D64.9 ANEMIA: Status: ACTIVE | Noted: 2021-12-11

## 2021-12-11 PROBLEM — E87.6 HYPOKALEMIA: Status: ACTIVE | Noted: 2021-12-11

## 2021-12-11 PROBLEM — D68.9 COAGULOPATHY (HCC): Status: ACTIVE | Noted: 2021-12-11

## 2021-12-17 LAB
APTT PPP: 36 SEC (ref 24–33)
INR PPP: 1.2 (ref 0.9–1.2)
PROTHROMBIN TIME: 12.9 SEC (ref 9.1–12)

## 2022-01-22 ENCOUNTER — APPOINTMENT (OUTPATIENT)
Dept: CT IMAGING | Age: 27
DRG: 898 | End: 2022-01-22
Attending: EMERGENCY MEDICINE
Payer: OTHER GOVERNMENT

## 2022-01-22 ENCOUNTER — APPOINTMENT (OUTPATIENT)
Dept: ULTRASOUND IMAGING | Age: 27
DRG: 898 | End: 2022-01-22
Attending: EMERGENCY MEDICINE
Payer: OTHER GOVERNMENT

## 2022-01-22 ENCOUNTER — HOSPITAL ENCOUNTER (INPATIENT)
Age: 27
LOS: 2 days | Discharge: HOME | DRG: 898 | End: 2022-01-24
Attending: EMERGENCY MEDICINE | Admitting: INTERNAL MEDICINE
Payer: OTHER GOVERNMENT

## 2022-01-22 DIAGNOSIS — F10.10 ALCOHOL ABUSE: ICD-10-CM

## 2022-01-22 DIAGNOSIS — R00.0 TACHYCARDIA: Primary | ICD-10-CM

## 2022-01-22 DIAGNOSIS — E80.6 HYPERBILIRUBINEMIA: ICD-10-CM

## 2022-01-22 DIAGNOSIS — D61.818 PANCYTOPENIA (HCC): ICD-10-CM

## 2022-01-22 PROBLEM — E86.0 DEHYDRATION: Status: ACTIVE | Noted: 2022-01-22

## 2022-01-22 PROBLEM — F10.939 ALCOHOL WITHDRAWAL (HCC): Status: ACTIVE | Noted: 2022-01-22

## 2022-01-22 PROBLEM — E87.20 LACTIC ACIDOSIS: Status: ACTIVE | Noted: 2022-01-22

## 2022-01-22 LAB
ALBUMIN SERPL-MCNC: 2.4 G/DL (ref 3.5–5)
ALBUMIN/GLOB SERPL: 0.5 {RATIO} (ref 1.1–2.2)
ALP SERPL-CCNC: 162 U/L (ref 45–117)
ALT SERPL-CCNC: 44 U/L (ref 12–78)
AMMONIA PLAS-SCNC: 55 UMOL/L
ANION GAP SERPL CALC-SCNC: 18 MMOL/L (ref 5–15)
AST SERPL W P-5'-P-CCNC: 208 U/L (ref 15–37)
BASOPHILS # BLD: 0 K/UL (ref 0–0.1)
BASOPHILS NFR BLD: 0 % (ref 0–1)
BILIRUB SERPL-MCNC: 3.4 MG/DL (ref 0.2–1)
BNP SERPL-MCNC: 284 PG/ML
BUN SERPL-MCNC: 7 MG/DL (ref 6–20)
BUN/CREAT SERPL: 8 (ref 12–20)
CA-I BLD-MCNC: 8.3 MG/DL (ref 8.5–10.1)
CHLORIDE SERPL-SCNC: 105 MMOL/L (ref 97–108)
CO2 SERPL-SCNC: 15 MMOL/L (ref 21–32)
COVID-19 RAPID TEST, COVR: NOT DETECTED
CREAT SERPL-MCNC: 0.83 MG/DL (ref 0.55–1.02)
D DIMER PPP FEU-MCNC: 2.61 UG/ML(FEU)
DIFFERENTIAL METHOD BLD: ABNORMAL
EOSINOPHIL # BLD: 0 K/UL (ref 0–0.4)
EOSINOPHIL NFR BLD: 1 % (ref 0–7)
ERYTHROCYTE [DISTWIDTH] IN BLOOD BY AUTOMATED COUNT: 18.2 % (ref 11.5–14.5)
GLOBULIN SER CALC-MCNC: 4.9 G/DL (ref 2–4)
GLUCOSE SERPL-MCNC: 104 MG/DL (ref 65–100)
HCG SERPL QL: NEGATIVE
HCT VFR BLD AUTO: 33.3 % (ref 35–47)
HGB BLD-MCNC: 10.7 G/DL (ref 11.5–16)
IMM GRANULOCYTES # BLD AUTO: 0 K/UL (ref 0–0.04)
IMM GRANULOCYTES NFR BLD AUTO: 1 % (ref 0–0.5)
INR PPP: 2 (ref 0.9–1.1)
LACTATE SERPL-SCNC: 5.5 MMOL/L (ref 0.4–2)
LACTATE SERPL-SCNC: 6.6 MMOL/L (ref 0.4–2)
LIPASE SERPL-CCNC: 65 U/L (ref 73–393)
LYMPHOCYTES # BLD: 0.8 K/UL (ref 0.8–3.5)
LYMPHOCYTES NFR BLD: 32 % (ref 12–49)
MAGNESIUM SERPL-MCNC: 1.6 MG/DL (ref 1.6–2.4)
MCH RBC QN AUTO: 32.3 PG (ref 26–34)
MCHC RBC AUTO-ENTMCNC: 32.1 G/DL (ref 30–36.5)
MCV RBC AUTO: 100.6 FL (ref 80–99)
MONOCYTES # BLD: 0.2 K/UL (ref 0–1)
MONOCYTES NFR BLD: 7 % (ref 5–13)
NEUTS SEG # BLD: 1.4 K/UL (ref 1.8–8)
NEUTS SEG NFR BLD: 59 % (ref 32–75)
NRBC # BLD: 0 K/UL (ref 0–0.01)
NRBC BLD-RTO: 0 PER 100 WBC
PLATELET # BLD AUTO: 62 K/UL (ref 150–400)
PMV BLD AUTO: 9.9 FL (ref 8.9–12.9)
POTASSIUM SERPL-SCNC: 2.8 MMOL/L (ref 3.5–5.1)
PROT SERPL-MCNC: 7.3 G/DL (ref 6.4–8.2)
PROTHROMBIN TIME: 22.4 SEC (ref 11.9–14.7)
RBC # BLD AUTO: 3.31 M/UL (ref 3.8–5.2)
RBC MORPH BLD: ABNORMAL
RBC MORPH BLD: ABNORMAL
SODIUM SERPL-SCNC: 138 MMOL/L (ref 136–145)
SPECIMEN SOURCE: NORMAL
TROPONIN-HIGH SENSITIVITY: 5 NG/L (ref 0–51)
TSH SERPL DL<=0.05 MIU/L-ACNC: 2.4 UIU/ML (ref 0.36–3.74)
WBC # BLD AUTO: 2.4 K/UL (ref 3.6–11)

## 2022-01-22 PROCEDURE — 82140 ASSAY OF AMMONIA: CPT

## 2022-01-22 PROCEDURE — 74011250636 HC RX REV CODE- 250/636: Performed by: EMERGENCY MEDICINE

## 2022-01-22 PROCEDURE — 83605 ASSAY OF LACTIC ACID: CPT

## 2022-01-22 PROCEDURE — 83880 ASSAY OF NATRIURETIC PEPTIDE: CPT

## 2022-01-22 PROCEDURE — 87040 BLOOD CULTURE FOR BACTERIA: CPT

## 2022-01-22 PROCEDURE — 83690 ASSAY OF LIPASE: CPT

## 2022-01-22 PROCEDURE — 96361 HYDRATE IV INFUSION ADD-ON: CPT

## 2022-01-22 PROCEDURE — 83735 ASSAY OF MAGNESIUM: CPT

## 2022-01-22 PROCEDURE — 74011000636 HC RX REV CODE- 636: Performed by: INTERNAL MEDICINE

## 2022-01-22 PROCEDURE — 71275 CT ANGIOGRAPHY CHEST: CPT

## 2022-01-22 PROCEDURE — 65270000029 HC RM PRIVATE

## 2022-01-22 PROCEDURE — 85379 FIBRIN DEGRADATION QUANT: CPT

## 2022-01-22 PROCEDURE — 99284 EMERGENCY DEPT VISIT MOD MDM: CPT

## 2022-01-22 PROCEDURE — 74011000250 HC RX REV CODE- 250: Performed by: INTERNAL MEDICINE

## 2022-01-22 PROCEDURE — 85610 PROTHROMBIN TIME: CPT

## 2022-01-22 PROCEDURE — 74011250637 HC RX REV CODE- 250/637: Performed by: INTERNAL MEDICINE

## 2022-01-22 PROCEDURE — 70450 CT HEAD/BRAIN W/O DYE: CPT

## 2022-01-22 PROCEDURE — 96374 THER/PROPH/DIAG INJ IV PUSH: CPT

## 2022-01-22 PROCEDURE — 84703 CHORIONIC GONADOTROPIN ASSAY: CPT

## 2022-01-22 PROCEDURE — 76705 ECHO EXAM OF ABDOMEN: CPT

## 2022-01-22 PROCEDURE — 85025 COMPLETE CBC W/AUTO DIFF WBC: CPT

## 2022-01-22 PROCEDURE — 84443 ASSAY THYROID STIM HORMONE: CPT

## 2022-01-22 PROCEDURE — 36415 COLL VENOUS BLD VENIPUNCTURE: CPT

## 2022-01-22 PROCEDURE — 84484 ASSAY OF TROPONIN QUANT: CPT

## 2022-01-22 PROCEDURE — 80053 COMPREHEN METABOLIC PANEL: CPT

## 2022-01-22 PROCEDURE — 87635 SARS-COV-2 COVID-19 AMP PRB: CPT

## 2022-01-22 RX ORDER — POTASSIUM CHLORIDE 750 MG/1
40 TABLET, FILM COATED, EXTENDED RELEASE ORAL DAILY
Status: DISCONTINUED | OUTPATIENT
Start: 2022-01-23 | End: 2022-01-24 | Stop reason: HOSPADM

## 2022-01-22 RX ORDER — LORAZEPAM 1 MG/1
2 TABLET ORAL
Status: DISPENSED | OUTPATIENT
Start: 2022-01-22 | End: 2022-01-22

## 2022-01-22 RX ORDER — ACETAMINOPHEN 325 MG/1
650 TABLET ORAL
Status: DISCONTINUED | OUTPATIENT
Start: 2022-01-22 | End: 2022-01-24 | Stop reason: HOSPADM

## 2022-01-22 RX ORDER — POTASSIUM CHLORIDE 7.45 MG/ML
10 INJECTION INTRAVENOUS
Status: COMPLETED | OUTPATIENT
Start: 2022-01-22 | End: 2022-01-22

## 2022-01-22 RX ORDER — ACETAMINOPHEN 650 MG/1
650 SUPPOSITORY RECTAL
Status: DISCONTINUED | OUTPATIENT
Start: 2022-01-22 | End: 2022-01-24 | Stop reason: HOSPADM

## 2022-01-22 RX ORDER — ONDANSETRON 2 MG/ML
4 INJECTION INTRAMUSCULAR; INTRAVENOUS
Status: COMPLETED | OUTPATIENT
Start: 2022-01-22 | End: 2022-01-22

## 2022-01-22 RX ORDER — VENLAFAXINE HYDROCHLORIDE 75 MG/1
150 CAPSULE, EXTENDED RELEASE ORAL DAILY
Status: DISCONTINUED | OUTPATIENT
Start: 2022-01-23 | End: 2022-01-24 | Stop reason: HOSPADM

## 2022-01-22 RX ORDER — SODIUM CHLORIDE 9 MG/ML
1000 INJECTION, SOLUTION INTRAVENOUS CONTINUOUS
Status: DISCONTINUED | OUTPATIENT
Start: 2022-01-22 | End: 2022-01-23

## 2022-01-22 RX ORDER — GABAPENTIN 100 MG/1
100 CAPSULE ORAL 2 TIMES DAILY
Status: DISCONTINUED | OUTPATIENT
Start: 2022-01-22 | End: 2022-01-24 | Stop reason: HOSPADM

## 2022-01-22 RX ORDER — FOLIC ACID 1 MG/1
1 TABLET ORAL DAILY
Status: DISCONTINUED | OUTPATIENT
Start: 2022-01-23 | End: 2022-01-24 | Stop reason: HOSPADM

## 2022-01-22 RX ORDER — SODIUM CHLORIDE 0.9 % (FLUSH) 0.9 %
5-40 SYRINGE (ML) INJECTION AS NEEDED
Status: DISCONTINUED | OUTPATIENT
Start: 2022-01-22 | End: 2022-01-24 | Stop reason: HOSPADM

## 2022-01-22 RX ORDER — LORAZEPAM 1 MG/1
2 TABLET ORAL
Status: DISCONTINUED | OUTPATIENT
Start: 2022-01-22 | End: 2022-01-23

## 2022-01-22 RX ORDER — SPIRONOLACTONE 25 MG/1
50 TABLET ORAL DAILY
Status: DISCONTINUED | OUTPATIENT
Start: 2022-01-23 | End: 2022-01-24 | Stop reason: HOSPADM

## 2022-01-22 RX ORDER — ONDANSETRON 2 MG/ML
4 INJECTION INTRAMUSCULAR; INTRAVENOUS
Status: DISCONTINUED | OUTPATIENT
Start: 2022-01-22 | End: 2022-01-24 | Stop reason: HOSPADM

## 2022-01-22 RX ORDER — ONDANSETRON 4 MG/1
4 TABLET, ORALLY DISINTEGRATING ORAL
Status: DISCONTINUED | OUTPATIENT
Start: 2022-01-22 | End: 2022-01-24 | Stop reason: HOSPADM

## 2022-01-22 RX ORDER — SODIUM CHLORIDE 0.9 % (FLUSH) 0.9 %
5-40 SYRINGE (ML) INJECTION EVERY 8 HOURS
Status: DISCONTINUED | OUTPATIENT
Start: 2022-01-22 | End: 2022-01-24 | Stop reason: HOSPADM

## 2022-01-22 RX ORDER — ASPIRIN 325 MG/1
100 TABLET, FILM COATED ORAL DAILY
Status: DISCONTINUED | OUTPATIENT
Start: 2022-01-23 | End: 2022-01-24 | Stop reason: HOSPADM

## 2022-01-22 RX ORDER — POLYETHYLENE GLYCOL 3350 17 G/17G
17 POWDER, FOR SOLUTION ORAL DAILY PRN
Status: DISCONTINUED | OUTPATIENT
Start: 2022-01-22 | End: 2022-01-24 | Stop reason: HOSPADM

## 2022-01-22 RX ORDER — FUROSEMIDE 40 MG/1
40 TABLET ORAL DAILY
Status: DISCONTINUED | OUTPATIENT
Start: 2022-01-23 | End: 2022-01-24 | Stop reason: HOSPADM

## 2022-01-22 RX ADMIN — POTASSIUM CHLORIDE 10 MEQ: 7.46 INJECTION, SOLUTION INTRAVENOUS at 19:18

## 2022-01-22 RX ADMIN — GABAPENTIN 100 MG: 100 CAPSULE ORAL at 23:06

## 2022-01-22 RX ADMIN — SODIUM CHLORIDE 1000 ML: 9 INJECTION, SOLUTION INTRAVENOUS at 16:30

## 2022-01-22 RX ADMIN — LORAZEPAM 2 MG: 1 TABLET ORAL at 22:08

## 2022-01-22 RX ADMIN — ONDANSETRON 4 MG: 2 INJECTION INTRAMUSCULAR; INTRAVENOUS at 17:43

## 2022-01-22 RX ADMIN — SODIUM CHLORIDE, PRESERVATIVE FREE 10 ML: 5 INJECTION INTRAVENOUS at 23:02

## 2022-01-22 RX ADMIN — IOPAMIDOL 100 ML: 755 INJECTION, SOLUTION INTRAVENOUS at 21:42

## 2022-01-22 RX ADMIN — POTASSIUM CHLORIDE 10 MEQ: 7.46 INJECTION, SOLUTION INTRAVENOUS at 17:44

## 2022-01-22 RX ADMIN — POTASSIUM CHLORIDE 10 MEQ: 7.46 INJECTION, SOLUTION INTRAVENOUS at 20:16

## 2022-01-22 RX ADMIN — POTASSIUM CHLORIDE 10 MEQ: 7.46 INJECTION, SOLUTION INTRAVENOUS at 22:07

## 2022-01-22 NOTE — ED TRIAGE NOTES
Pt is an alert and oriented female coming from home, has been weak and lethargic for the past couple of days. Slow to respond for ems. Little PO intake. Tachy for ems.

## 2022-01-22 NOTE — ED PROVIDER NOTES
EMERGENCY DEPARTMENT HISTORY AND PHYSICAL EXAM      Date: 1/22/2022  Patient Name: Jessica Thao      History of Presenting Illness     Chief Complaint   Patient presents with    Lethargy       History Provided By: Patient    HPI: Jessica Thao, 27 y.o. female with a past medical history significant Hep C, PCOS, alcoholic hepatitis presents to the ED with cc of lethargy and nausea. States she has not been able to eat for the last few days. She does have a history of chronic nausea which she takes Zofran for. EMS found her to be tachycardic to the 140s and sinus tachycardia. Patient states her heart rate baseline runs in the 120s of this is not that high for her. She does have a history of alcoholic hepatitis and endorses recent drinking though none in the last 2 to 3 days. She states that she does not feel he is withdrawing from alcohol at this time. No fevers or cough. No known sick contacts. There are no other complaints, changes, or physical findings at this time.     PCP: Ines Han MD    Current Facility-Administered Medications   Medication Dose Route Frequency Provider Last Rate Last Admin    0.9% sodium chloride infusion 1,000 mL  1,000 mL IntraVENous CONTINUOUS Aaron Haque MD   IV Completed at 01/22/22 1904    [START ON 1/23/2022] potassium chloride SR (KLOR-CON 10) tablet 40 mEq  40 mEq Oral DAILY Aaron Haque MD        lactulose (CHRONULAC) 10 gram/15 mL solution 30 mL  30 mL Oral Q6H Gary Ricks MD        LORazepam (ATIVAN) tablet 2 mg  2 mg Oral Q1H PRN Gary Ricks MD        [START ON 1/23/2022] multivitamin, tx-iron-ca-min (THERA-M w/ IRON) tablet 1 Tablet  1 Tablet Oral DAILY Gary Ricks MD [facility] ON 1/23/2022] thiamine mononitrate (B-1) tablet 100 mg  100 mg Oral DAILY Gary Ricks MD         Current Outpatient Medications   Medication Sig Dispense Refill    diclofenac (VOLTAREN) 1 % gel APPLY 1 GRAM TWICE DAILY AS DIRECTED (Patient not taking: Reported on 12/10/2021)      Vitamin D2 1,250 mcg (50,000 unit) capsule take 1 capsule by mouth every week ON FRIDAY AT 9AM (Patient not taking: Reported on 12/10/2021)      lidocaine (LIDODERM) 5 % apply 1 patch TO THE AFFECTED AREA DAILY. LEAVE ON FOR 12 HOURS AND THEN OFF FOR 12 HOURS. (Patient not taking: Reported on 12/10/2021)      furosemide (LASIX) 40 mg tablet Take 1 Tablet by mouth daily. 90 Tablet 0    spironolactone (ALDACTONE) 25 mg tablet take 2 tablets by mouth once daily as directed 90 Tablet 0    folic acid (FOLVITE) 1 mg tablet Take 1 Tablet by mouth daily. 90 Tablet 1    gabapentin (NEURONTIN) 100 mg capsule Take 1 Capsule by mouth two (2) times a day. Max Daily Amount: 200 mg. 180 Capsule 0    venlafaxine-SR (EFFEXOR-XR) 150 mg capsule Take 1 Capsule by mouth daily. 90 Capsule 0    potassium bicarb-citric acid (Effer-K) 20 mEq tablet Take 1 Tablet by mouth daily.  80 Tablet 1       Past History     Past Medical History:  Past Medical History:   Diagnosis Date    Depression     Eczema     Hepatitis C     PCOS (polycystic ovarian syndrome)     Polycystic ovarian syndrome        Past Surgical History:  Past Surgical History:   Procedure Laterality Date    HX ADENOIDECTOMY  2001    HX TONSILLECTOMY  2010    HX WISDOM TEETH EXTRACTION         Family History:  Family History   Problem Relation Age of Onset    Alcohol abuse Father     Diabetes Maternal Grandfather     Other Maternal Grandmother         macular degeneration    Cancer Maternal Grandmother         skin    Depression Mother     Atopy Sister     No Known Problems Brother     No Known Problems Paternal Grandmother     No Known Problems Paternal Grandfather     Heart Disease Neg Hx     Hypertension Neg Hx        Social History:  Social History     Tobacco Use    Smoking status: Current Every Day Smoker     Packs/day: 0.25     Years: 1.00     Pack years: 0.25     Types: Cigarettes    Smokeless tobacco: Never Used Vaping Use    Vaping Use: Never used   Substance Use Topics    Alcohol use: No    Drug use: No       Allergies: Allergies   Allergen Reactions    Sulfa (Sulfonamide Antibiotics) Anaphylaxis, Hives and Shortness of Breath         Review of Systems     Review of Systems   Constitutional: Positive for activity change and fatigue. Negative for fever. HENT: Negative for congestion, postnasal drip, rhinorrhea and sore throat. Eyes: Negative for visual disturbance. Respiratory: Negative for cough and shortness of breath. Cardiovascular: Negative for chest pain. Gastrointestinal: Positive for nausea. Negative for abdominal pain, diarrhea and vomiting. Genitourinary: Negative for dysuria. Musculoskeletal: Positive for myalgias. Negative for arthralgias. Skin: Negative for rash and wound. Neurological: Negative for headaches. Psychiatric/Behavioral: Negative for confusion. All other systems reviewed and are negative. Physical Exam     Physical Exam  Vitals and nursing note reviewed. Constitutional:       Appearance: Normal appearance. She is normal weight. HENT:      Head: Normocephalic and atraumatic. Nose: Nose normal.      Mouth/Throat:      Mouth: Mucous membranes are moist.   Eyes:      Conjunctiva/sclera: Conjunctivae normal.   Cardiovascular:      Rate and Rhythm: Tachycardia present. Pulses: Normal pulses. Pulmonary:      Effort: Pulmonary effort is normal. No respiratory distress. Abdominal:      Tenderness: There is no abdominal tenderness. Musculoskeletal:         General: No swelling or deformity. Normal range of motion. Cervical back: No tenderness. Skin:     General: Skin is warm and dry. Coloration: Skin is pale. Findings: Bruising (Scattered, bilateral lower extremities) present. No rash. Neurological:      General: No focal deficit present. Mental Status: She is alert and oriented to person, place, and time.       Motor: Weakness (Diffusely weak but nonfocal) present. Psychiatric:         Mood and Affect: Mood normal.         Behavior: Behavior normal.         Lab and Diagnostic Study Results     Labs -     Recent Results (from the past 12 hour(s))   CBC WITH AUTOMATED DIFF    Collection Time: 01/22/22  4:06 PM   Result Value Ref Range    WBC 2.4 (L) 3.6 - 11.0 K/uL    RBC 3.31 (L) 3.80 - 5.20 M/uL    HGB 10.7 (L) 11.5 - 16.0 g/dL    HCT 33.3 (L) 35.0 - 47.0 %    .6 (H) 80.0 - 99.0 FL    MCH 32.3 26.0 - 34.0 PG    MCHC 32.1 30.0 - 36.5 g/dL    RDW 18.2 (H) 11.5 - 14.5 %    PLATELET 62 (L) 206 - 400 K/uL    MPV 9.9 8.9 - 12.9 FL    NRBC 0.0 0.0  WBC    ABSOLUTE NRBC 0.00 0.00 - 0.01 K/uL    NEUTROPHILS 59 32 - 75 %    LYMPHOCYTES 32 12 - 49 %    MONOCYTES 7 5 - 13 %    EOSINOPHILS 1 0 - 7 %    BASOPHILS 0 0 - 1 %    IMMATURE GRANULOCYTES 1 (H) 0 - 0.5 %    ABS. NEUTROPHILS 1.4 (L) 1.8 - 8.0 K/UL    ABS. LYMPHOCYTES 0.8 0.8 - 3.5 K/UL    ABS. MONOCYTES 0.2 0.0 - 1.0 K/UL    ABS. EOSINOPHILS 0.0 0.0 - 0.4 K/UL    ABS. BASOPHILS 0.0 0.0 - 0.1 K/UL    ABS. IMM. GRANS. 0.0 0.00 - 0.04 K/UL    DF AUTOMATED      RBC COMMENTS Normocytic, Normochromic      RBC COMMENTS NO BLASTS OR ABNORMAL LYMPHOCYTES NOTED    METABOLIC PANEL, COMPREHENSIVE    Collection Time: 01/22/22  4:06 PM   Result Value Ref Range    Sodium 138 136 - 145 mmol/L    Potassium 2.8 (L) 3.5 - 5.1 mmol/L    Chloride 105 97 - 108 mmol/L    CO2 15 (LL) 21 - 32 mmol/L    Anion gap 18 (H) 5 - 15 mmol/L    Glucose 104 (H) 65 - 100 mg/dL    BUN 7 6 - 20 mg/dL    Creatinine 0.83 0.55 - 1.02 mg/dL    BUN/Creatinine ratio 8 (L) 12 - 20      GFR est AA >60 >60 ml/min/1.73m2    GFR est non-AA >60 >60 ml/min/1.73m2    Calcium 8.3 (L) 8.5 - 10.1 mg/dL    Bilirubin, total 3.4 (H) 0.2 - 1.0 mg/dL    AST (SGOT) 208 (H) 15 - 37 U/L    ALT (SGPT) 44 12 - 78 U/L    Alk.  phosphatase 162 (H) 45 - 117 U/L    Protein, total 7.3 6.4 - 8.2 g/dL    Albumin 2.4 (L) 3.5 - 5.0 g/dL    Globulin 4.9 (H) 2.0 - 4.0 g/dL    A-G Ratio 0.5 (L) 1.1 - 2.2     LIPASE    Collection Time: 01/22/22  4:06 PM   Result Value Ref Range    Lipase 65 (L) 73 - 393 U/L   TROPONIN-HIGH SENSITIVITY    Collection Time: 01/22/22  4:06 PM   Result Value Ref Range    Troponin-High Sensitivity 5 0 - 51 ng/L   NT-PRO BNP    Collection Time: 01/22/22  4:06 PM   Result Value Ref Range    NT pro- (H) <125 pg/mL   TSH 3RD GENERATION    Collection Time: 01/22/22  4:06 PM   Result Value Ref Range    TSH 2.40 0.36 - 3.74 uIU/mL   COVID-19 RAPID TEST    Collection Time: 01/22/22  4:23 PM   Result Value Ref Range    Specimen source Please find results under separate order      COVID-19 rapid test Not Detected Not Detected     D DIMER    Collection Time: 01/22/22  4:23 PM   Result Value Ref Range    D DIMER 2.61 (H) <0.50 ug/ml(FEU)   PROTHROMBIN TIME + INR    Collection Time: 01/22/22  4:23 PM   Result Value Ref Range    Prothrombin time 22.4 (H) 11.9 - 14.7 sec    INR 2.0 (H) 0.9 - 1.1     MAGNESIUM    Collection Time: 01/22/22  5:58 PM   Result Value Ref Range    Magnesium 1.6 1.6 - 2.4 mg/dL   LACTIC ACID    Collection Time: 01/22/22  5:58 PM   Result Value Ref Range    Lactic acid 6.6 (HH) 0.4 - 2.0 mmol/L   HCG QL SERUM    Collection Time: 01/22/22  5:58 PM   Result Value Ref Range    HCG, Ql. Negative Negative     LACTIC ACID    Collection Time: 01/22/22  7:05 PM   Result Value Ref Range    Lactic acid 5.5 (HH) 0.4 - 2.0 mmol/L       Radiologic Studies -   [unfilled]  CT Results  (Last 48 hours)    None        CXR Results  (Last 48 hours)    None          Medical Decision Making and ED Course   - I am the first and primary provider for this patient AND AM THE PRIMARY PROVIDER OF RECORD. - I reviewed the vital signs, available nursing notes, past medical history, past surgical history, family history and social history. - Initial assessment performed.  The patients presenting problems have been discussed, and the staff are in agreement with the care plan formulated and outlined with them. I have encouraged them to ask questions as they arise throughout their visit. Vital Signs-Reviewed the patient's vital signs. Patient Vitals for the past 12 hrs:   Temp Pulse Resp BP SpO2   01/22/22 1926  (!) 120 26 119/73 98 %   01/22/22 1753  (!) 125 20 107/72 98 %   01/22/22 1558 97.5 °F (36.4 °C) (!) 120 20 118/77 100 %       Records Reviewed: Nursing Notes    ED Course:       ED Course as of 01/22/22 2152   Sat Jan 22, 2022   124 51-year-old female presents for evaluation of lethargy and dehydration. She states that she has chronic nausea followed by GI. She does have a history of alcoholic hepatitis as well. She states she has been drinking lately but none in the last few days. Does not feel like she has an alcohol withdrawal at this time. When EMS arrived patient was in sinus tach at a rate of 140. She states she has not been eating or drinking much secondary to nausea and no appetite. No known sick contacts. Patient is vaccinated for COVID. Denies no abdominal pain. Differential diagnosis is broad and includes dehydration versus viral syndrome including COVID versus ACS versus pancreatitis versus thyroid disease versus electrolyte disarray versus pregnancy. Labs including a CBC, CMP, troponin, lipase, BMP, D-dimer, test as well as a UA. Disposition as per clinical course. Giving fluids and Zofran [LW]   6498 Labs with multiple abnormalities. Patient is pancytopenic. Bicarb is low with an elevation in her anion gap. Bilirubin is elevated to 3.4. Added RUQ US. Will likely require admission. [LW]   1813 MELD score is 19 [LW]      ED Course User Index  [LW] Melvin Shipley MD             Consultations:       Consultations: -  Hospitalist Consultant: Dr. Gauri Bar: We have asked for emergent assistance with regard to this patient. We have discussed the patients HPI, ROS, PE and results this far.   They will come and evaluate the patient for admission. Disposition     Disposition: Admitted to Floor Medical Floor the case was discussed with the admitting physician Millicent. Admitted  Diagnosis     Clinical Impression:   1. Tachycardia    2. Pancytopenia ([address]    3. Hyperbilirubinemia    4. Alcohol abuse        Attestations:    Alison Sherwood MD    Please note that this dictation was completed with [employer], the computer voice recognition software. Quite often unanticipated grammatical, syntax, homophones, and other interpretive errors are inadvertently transcribed by the computer software. Please disregard these errors. Please excuse any errors that have escaped final proofreading. Thank you.

## 2022-01-23 LAB
ANION GAP SERPL CALC-SCNC: 5 MMOL/L (ref 5–15)
BUN SERPL-MCNC: 6 MG/DL (ref 6–20)
BUN/CREAT SERPL: 11 (ref 12–20)
CA-I BLD-MCNC: 7.9 MG/DL (ref 8.5–10.1)
CHLORIDE SERPL-SCNC: 108 MMOL/L (ref 97–108)
CO2 SERPL-SCNC: 25 MMOL/L (ref 21–32)
CREAT SERPL-MCNC: 0.55 MG/DL (ref 0.55–1.02)
ERYTHROCYTE [DISTWIDTH] IN BLOOD BY AUTOMATED COUNT: 17.8 % (ref 11.5–14.5)
GLUCOSE SERPL-MCNC: 93 MG/DL (ref 65–100)
HAV IGM SER QL: NONREACTIVE
HBV CORE IGM SER QL: NONREACTIVE
HBV SURFACE AG SER QL: <0.1 INDEX
HBV SURFACE AG SER QL: NEGATIVE
HCT VFR BLD AUTO: 27.7 % (ref 35–47)
HCV AB SER IA-ACNC: 0.04 INDEX
HCV AB SERPL QL IA: NONREACTIVE
HGB BLD-MCNC: 9 G/DL (ref 11.5–16)
LACTATE SERPL-SCNC: 1.2 MMOL/L (ref 0.4–2)
MAGNESIUM SERPL-MCNC: 1.5 MG/DL (ref 1.6–2.4)
MCH RBC QN AUTO: 32.3 PG (ref 26–34)
MCHC RBC AUTO-ENTMCNC: 32.5 G/DL (ref 30–36.5)
MCV RBC AUTO: 99.3 FL (ref 80–99)
NRBC # BLD: 0 K/UL (ref 0–0.01)
NRBC BLD-RTO: 0 PER 100 WBC
PLATELET # BLD AUTO: 71 K/UL (ref 150–400)
PMV BLD AUTO: 10.6 FL (ref 8.9–12.9)
POTASSIUM SERPL-SCNC: 3.2 MMOL/L (ref 3.5–5.1)
RBC # BLD AUTO: 2.79 M/UL (ref 3.8–5.2)
SODIUM SERPL-SCNC: 138 MMOL/L (ref 136–145)
WBC # BLD AUTO: 2.6 K/UL (ref 3.6–11)

## 2022-01-23 PROCEDURE — 83605 ASSAY OF LACTIC ACID: CPT

## 2022-01-23 PROCEDURE — 74011250636 HC RX REV CODE- 250/636: Performed by: INTERNAL MEDICINE

## 2022-01-23 PROCEDURE — 80074 ACUTE HEPATITIS PANEL: CPT

## 2022-01-23 PROCEDURE — 85027 COMPLETE CBC AUTOMATED: CPT

## 2022-01-23 PROCEDURE — 83735 ASSAY OF MAGNESIUM: CPT

## 2022-01-23 PROCEDURE — 74011250637 HC RX REV CODE- 250/637: Performed by: INTERNAL MEDICINE

## 2022-01-23 PROCEDURE — 65270000029 HC RM PRIVATE

## 2022-01-23 PROCEDURE — 36415 COLL VENOUS BLD VENIPUNCTURE: CPT

## 2022-01-23 PROCEDURE — 74011000250 HC RX REV CODE- 250: Performed by: INTERNAL MEDICINE

## 2022-01-23 PROCEDURE — 80048 BASIC METABOLIC PNL TOTAL CA: CPT

## 2022-01-23 RX ORDER — IBUPROFEN 200 MG
400 TABLET ORAL AS NEEDED
COMMUNITY
End: 2022-01-24

## 2022-01-23 RX ORDER — MAGNESIUM SULFATE HEPTAHYDRATE 40 MG/ML
2 INJECTION, SOLUTION INTRAVENOUS
Status: COMPLETED | OUTPATIENT
Start: 2022-01-23 | End: 2022-01-23

## 2022-01-23 RX ORDER — LORAZEPAM 2 MG/ML
2 INJECTION INTRAMUSCULAR
Status: DISCONTINUED | OUTPATIENT
Start: 2022-01-23 | End: 2022-01-24 | Stop reason: HOSPADM

## 2022-01-23 RX ORDER — POTASSIUM CHLORIDE 1.5 G/1.77G
40 POWDER, FOR SOLUTION ORAL EVERY 4 HOURS
Status: COMPLETED | OUTPATIENT
Start: 2022-01-23 | End: 2022-01-23

## 2022-01-23 RX ORDER — MAGNESIUM SULFATE 4 G/50ML
4 INJECTION INTRAVENOUS ONCE
Status: DISCONTINUED | OUTPATIENT
Start: 2022-01-23 | End: 2022-01-23 | Stop reason: CLARIF

## 2022-01-23 RX ORDER — SODIUM CHLORIDE 9 MG/ML
100 INJECTION, SOLUTION INTRAVENOUS CONTINUOUS
Status: DISCONTINUED | OUTPATIENT
Start: 2022-01-23 | End: 2022-01-24 | Stop reason: HOSPADM

## 2022-01-23 RX ADMIN — THIAMINE HCL TAB 100 MG 100 MG: 100 TAB at 09:39

## 2022-01-23 RX ADMIN — GABAPENTIN 100 MG: 100 CAPSULE ORAL at 19:55

## 2022-01-23 RX ADMIN — MULTIPLE VITAMINS W/ MINERALS TAB 1 TABLET: TAB at 09:41

## 2022-01-23 RX ADMIN — SODIUM CHLORIDE 100 ML/HR: 9 INJECTION, SOLUTION INTRAVENOUS at 03:32

## 2022-01-23 RX ADMIN — SODIUM CHLORIDE, PRESERVATIVE FREE 10 ML: 5 INJECTION INTRAVENOUS at 05:48

## 2022-01-23 RX ADMIN — LACTULOSE 15 ML: 20 SOLUTION ORAL at 09:42

## 2022-01-23 RX ADMIN — MAGNESIUM SULFATE HEPTAHYDRATE 2 G: 40 INJECTION, SOLUTION INTRAVENOUS at 11:58

## 2022-01-23 RX ADMIN — LACTULOSE 15 ML: 20 SOLUTION ORAL at 19:54

## 2022-01-23 RX ADMIN — GABAPENTIN 100 MG: 100 CAPSULE ORAL at 09:40

## 2022-01-23 RX ADMIN — FOLIC ACID 1 MG: 1 TABLET ORAL at 09:40

## 2022-01-23 RX ADMIN — POTASSIUM CHLORIDE 40 MEQ: 1.5 POWDER, FOR SOLUTION ORAL at 11:58

## 2022-01-23 RX ADMIN — SODIUM CHLORIDE, PRESERVATIVE FREE 10 ML: 5 INJECTION INTRAVENOUS at 16:10

## 2022-01-23 RX ADMIN — SODIUM CHLORIDE 100 ML/HR: 9 INJECTION, SOLUTION INTRAVENOUS at 18:13

## 2022-01-23 RX ADMIN — VENLAFAXINE HYDROCHLORIDE 150 MG: 75 CAPSULE, EXTENDED RELEASE ORAL at 09:41

## 2022-01-23 RX ADMIN — SODIUM CHLORIDE, PRESERVATIVE FREE 10 ML: 5 INJECTION INTRAVENOUS at 19:51

## 2022-01-23 RX ADMIN — LACTULOSE 30 ML: 20 SOLUTION ORAL at 01:30

## 2022-01-23 RX ADMIN — MAGNESIUM SULFATE HEPTAHYDRATE 2 G: 40 INJECTION, SOLUTION INTRAVENOUS at 09:45

## 2022-01-23 RX ADMIN — POTASSIUM CHLORIDE 40 MEQ: 1.5 POWDER, FOR SOLUTION ORAL at 09:53

## 2022-01-23 NOTE — ED NOTES
Assumed care of pt. Bedside shift report received from [address]. Pt in bed resting with eyes open. Pt states small amount of abdominal pain rate 4/10 on pain scale.   Patient in no acute distress, writer will continue to monitor

## 2022-01-23 NOTE — ED NOTES
TRANSFER - OUT REPORT:    Verbal report given to Damon Middleton RN(name) on Ayala Harper  being transferred to [place] [id number] (unit) for routine progression of care       Report consisted of patients Situation, Background, Assessment and   Recommendations(SBAR). Information from the following report(s) SBAR, Kardex and ED Summary was reviewed with the receiving nurse. Lines:   Peripheral IV 01/22/22 Anterior;Left;Proximal Forearm (Active)        Opportunity for questions and clarification was provided.       Patient transported with:

## 2022-01-23 NOTE — H&P
GENERAL GENERIC H&P/CONSULT    Subjective:  61-year-old female with past medical history including hepatitis C, alcohol use disorder, cirrhosis of the liver. Patient presented to the emergency department complaining of generalized weakness, fatigue and shortness of breath and palpitations since this morning. Endorses alcohol consumptionnlast drink was 2-3 day ago. Patient is tremulous and appears anxious. Denies headache or hallucinations. Previously admitted in [facility] for dehydration and cirrhosis. In the ER patient is tachycardic, heart rate up to 125 my laboratory derangements including pancytopenia, lactic acidosis, metabolic acidosis with serum bicarb of 15, elevated liver enzymes with ALT AST ratio>2, coagulopathy. Serum ammonia is 55. She is alert and oriented. Patient is referred for admission for further management. Past Medical History:   Diagnosis Date    Depression     Eczema     Hepatitis C     PCOS (polycystic ovarian syndrome)     Polycystic ovarian syndrome       Past Surgical History:   Procedure Laterality Date    HX ADENOIDECTOMY  2001    HX TONSILLECTOMY  2010    HX WISDOM TEETH EXTRACTION        Prior to Admission medications    Medication Sig Start Date End Date Taking? Authorizing Provider   diclofenac (VOLTAREN) 1 % gel APPLY 1 GRAM TWICE DAILY AS DIRECTED  Patient not taking: Reported on 12/10/2021 10/25/21   Provider, Historical   Vitamin D2 1,250 mcg (50,000 unit) capsule take 1 capsule by mouth every week ON FRIDAY AT 9AM  Patient not taking: Reported on 12/10/2021 9/27/21   Provider, Historical   lidocaine (LIDODERM) 5 % apply 1 patch TO THE AFFECTED AREA DAILY. LEAVE ON FOR 12 HOURS AND THEN OFF FOR 12 HOURS. Patient not taking: Reported on 12/10/2021 10/25/21   Provider, Historical   furosemide (LASIX) 40 mg tablet Take 1 Tablet by mouth daily.  12/10/21   Vince King MD   spironolactone (ALDACTONE) 25 mg tablet take 2 tablets by mouth once daily as directed 12/10/21   Ines Han MD   folic acid (FOLVITE) 1 mg tablet Take 1 Tablet by mouth daily. 12/10/21   Ines Han MD   gabapentin (NEURONTIN) 100 mg capsule Take 1 Capsule by mouth two (2) times a day. Max Daily Amount: 200 mg. 12/10/21   Ines Han MD   [facility] 150 mg capsule Take 1 Capsule by mouth daily. 12/10/21   Ines Han MD   potassium bicarb-citric acid (Effer-K) 20 mEq tablet Take 1 Tablet by mouth daily. 12/10/21   Ines Han MD     Allergies   Allergen Reactions    Sulfa (Sulfonamide Antibiotics) Anaphylaxis, Hives and Shortness of Breath      Social History     Tobacco Use    Smoking status: Current Every Day Smoker     Packs/day: 0.25     Years: 1.00     Pack years: 0.25     Types: Cigarettes    Smokeless tobacco: Never Used   Substance Use Topics    Alcohol use: No      Family History   Problem Relation Age of Onset    Alcohol abuse Father     Diabetes Maternal Grandfather     Other Maternal Grandmother         macular degeneration    Cancer Maternal Grandmother         skin    Depression Mother     Atopy Sister     No Known Problems Brother     No Known Problems Paternal Grandmother     No Known Problems Paternal Grandfather     Heart Disease Neg Hx     Hypertension Neg Hx       Review of Systems   Constitutional: Positive for fatigue. Negative for appetite change, chills, diaphoresis and fever. HENT: Negative. Eyes: Negative for pain and visual disturbance. Respiratory: Negative. Cardiovascular: Negative. Gastrointestinal: Negative. Endocrine: Negative. Genitourinary: Negative. Musculoskeletal: Negative. Neurological: Positive for tremors. Negative for speech difficulty, weakness and headaches. Objective:    01/22 1901 - 01/23 0700  In: 1400 [I.V.:1400]  Out: -   No intake/output data recorded.   Patient Vitals for the past 8 hrs:   BP Temp Pulse Resp SpO2   01/23/22 0428 99/60  (!) 107 22 100 %   01/23/22 0155 100/62  (!) 110 22 100 %   01/22/22 2331 99/75 97.9 °F (36.6 °C) (!) 107 22 99 %     Physical Exam  Constitutional:       General: She is not in acute distress. Appearance: Normal appearance. HENT:      Head: Normocephalic and atraumatic. Mouth/Throat:      Mouth: Mucous membranes are dry. Pharynx: Oropharynx is clear. Eyes:      Extraocular Movements: Extraocular movements intact. Conjunctiva/sclera: Conjunctivae normal.      Pupils: Pupils are equal, round, and reactive to light. Cardiovascular:      Rate and Rhythm: Regular rhythm. Tachycardia present. Pulses: Normal pulses. Heart sounds: Normal heart sounds. No murmur heard. No friction rub. No gallop. Pulmonary:      Effort: Pulmonary effort is normal.      Breath sounds: Normal breath sounds. Abdominal:      General: Bowel sounds are normal.      Palpations: Abdomen is soft. Musculoskeletal:         General: Normal range of motion. Cervical back: Normal range of motion and neck supple. Neurological:      General: No focal deficit present. Mental Status: She is alert and oriented to person, place, and time. Mental status is at baseline. Cranial Nerves: No cranial nerve deficit. Motor: No weakness.           Labs:    Recent Results (from the past 24 hour(s))   CBC WITH AUTOMATED DIFF    Collection Time: 01/22/22  4:06 PM   Result Value Ref Range    WBC 2.4 (L) 3.6 - 11.0 K/uL    RBC 3.31 (L) 3.80 - 5.20 M/uL    HGB 10.7 (L) 11.5 - 16.0 g/dL    HCT 33.3 (L) 35.0 - 47.0 %    .6 (H) 80.0 - 99.0 FL    MCH 32.3 26.0 - 34.0 PG    MCHC 32.1 30.0 - 36.5 g/dL    RDW 18.2 (H) 11.5 - 14.5 %    PLATELET 62 (L) 940 - 400 K/uL    MPV 9.9 8.9 - 12.9 FL    NRBC 0.0 0.0  WBC    ABSOLUTE NRBC 0.00 0.00 - 0.01 K/uL    NEUTROPHILS 59 32 - 75 %    LYMPHOCYTES 32 12 - 49 %    MONOCYTES 7 5 - 13 %    EOSINOPHILS 1 0 - 7 %    BASOPHILS 0 0 - 1 %    IMMATURE GRANULOCYTES 1 (H) 0 - 0.5 % ABS. NEUTROPHILS 1.4 (L) 1.8 - 8.0 K/UL    ABS. LYMPHOCYTES 0.8 0.8 - 3.5 K/UL    ABS. MONOCYTES 0.2 0.0 - 1.0 K/UL    ABS. EOSINOPHILS 0.0 0.0 - 0.4 K/UL    ABS. BASOPHILS 0.0 0.0 - 0.1 K/UL    ABS. IMM. GRANS. 0.0 0.00 - 0.04 K/UL    DF AUTOMATED      RBC COMMENTS Normocytic, Normochromic      RBC COMMENTS NO BLASTS OR ABNORMAL LYMPHOCYTES NOTED    METABOLIC PANEL, COMPREHENSIVE    Collection Time: 01/22/22  4:06 PM   Result Value Ref Range    Sodium 138 136 - 145 mmol/L    Potassium 2.8 (L) 3.5 - 5.1 mmol/L    Chloride 105 97 - 108 mmol/L    CO2 15 (LL) 21 - 32 mmol/L    Anion gap 18 (H) 5 - 15 mmol/L    Glucose 104 (H) 65 - 100 mg/dL    BUN 7 6 - 20 mg/dL    Creatinine 0.83 0.55 - 1.02 mg/dL    BUN/Creatinine ratio 8 (L) 12 - 20      GFR est AA >60 >60 ml/min/1.73m2    GFR est non-AA >60 >60 ml/min/1.73m2    Calcium 8.3 (L) 8.5 - 10.1 mg/dL    Bilirubin, total 3.4 (H) 0.2 - 1.0 mg/dL    AST (SGOT) 208 (H) 15 - 37 U/L    ALT (SGPT) 44 12 - 78 U/L    Alk.  phosphatase 162 (H) 45 - 117 U/L    Protein, total 7.3 6.4 - 8.2 g/dL    Albumin 2.4 (L) 3.5 - 5.0 g/dL    Globulin 4.9 (H) 2.0 - 4.0 g/dL    A-G Ratio 0.5 (L) 1.1 - 2.2     LIPASE    Collection Time: 01/22/22  4:06 PM   Result Value Ref Range    Lipase 65 (L) 73 - 393 U/L   TROPONIN-HIGH SENSITIVITY    Collection Time: 01/22/22  4:06 PM   Result Value Ref Range    Troponin-High Sensitivity 5 0 - 51 ng/L   NT-PRO BNP    Collection Time: 01/22/22  4:06 PM   Result Value Ref Range    NT pro- (H) <125 pg/mL   TSH 3RD GENERATION    Collection Time: 01/22/22  4:06 PM   Result Value Ref Range    TSH 2.40 0.36 - 3.74 uIU/mL   COVID-19 RAPID TEST    Collection Time: 01/22/22  4:23 PM   Result Value Ref Range    Specimen source Please find results under separate order      COVID-19 rapid test Not Detected Not Detected     D DIMER    Collection Time: 01/22/22  4:23 PM   Result Value Ref Range    D DIMER 2.61 (H) <0.50 ug/ml(FEU)   PROTHROMBIN TIME + INR Collection Time: 01/22/22  4:23 PM   Result Value Ref Range    Prothrombin time 22.4 (H) 11.9 - 14.7 sec    INR 2.0 (H) 0.9 - 1.1     MAGNESIUM    Collection Time: 01/22/22  5:58 PM   Result Value Ref Range    Magnesium 1.6 1.6 - 2.4 mg/dL   LACTIC ACID    Collection Time: 01/22/22  5:58 PM   Result Value Ref Range    Lactic acid 6.6 (HH) 0.4 - 2.0 mmol/L   HCG QL SERUM    Collection Time: 01/22/22  5:58 PM   Result Value Ref Range    HCG, Ql. Negative Negative     LACTIC ACID    Collection Time: 01/22/22  7:05 PM   Result Value Ref Range    Lactic acid 5.5 (HH) 0.4 - 2.0 mmol/L   AMMONIA    Collection Time: 01/22/22  9:47 PM   Result Value Ref Range    Ammonia 55 (H) <78 umol/L   METABOLIC PANEL, BASIC    Collection Time: 01/23/22  3:35 AM   Result Value Ref Range    Sodium 138 136 - 145 mmol/L    Potassium 3.2 (L) 3.5 - 5.1 mmol/L    Chloride 108 97 - 108 mmol/L    CO2 25 21 - 32 mmol/L    Anion gap 5 5 - 15 mmol/L    Glucose 93 65 - 100 mg/dL    BUN 6 6 - 20 mg/dL    Creatinine 0.55 0.55 - 1.02 mg/dL    BUN/Creatinine ratio 11 (L) 12 - 20      GFR est AA >60 >60 ml/min/1.73m2    GFR est non-AA >60 >60 ml/min/1.73m2    Calcium 7.9 (L) 8.5 - 10.1 mg/dL   CBC W/O DIFF    Collection Time: 01/23/22  3:35 AM   Result Value Ref Range    WBC 2.6 (L) 3.6 - 11.0 K/uL    RBC 2.79 (L) 3.80 - 5.20 M/uL    HGB 9.0 (L) 11.5 - 16.0 g/dL    HCT 27.7 (L) 35.0 - 47.0 %    MCV 99.3 (H) 80.0 - 99.0 FL    MCH 32.3 26.0 - 34.0 PG    MCHC 32.5 30.0 - 36.5 g/dL    RDW 17.8 (H) 11.5 - 14.5 %    PLATELET 71 (L) 901 - 400 K/uL    MPV 10.6 8.9 - 12.9 FL    NRBC 0.0 0.0  WBC    ABSOLUTE NRBC 0.00 0.00 - 0.01 K/uL   LACTIC ACID    Collection Time: 01/23/22  3:35 AM   Result Value Ref Range    Lactic acid 1.2 0.4 - 2.0 mmol/L         Assessment:  Active Problems:    Alcohol withdrawal (HCC) (1/22/2022)      Dehydration (1/22/2022)      Lactic acidosis (1/22/2022)      EtOH withdrawal  -Loading with Ativan, as needed IV Ativan with CIWA protocol  -Thiamine, folic acid, multivitamin  -IV hydration  -Monitor for oversedation or need for escalation of benzodiazepine/sedated    Cirrhosis of the liver  -Likely alcoholic cirrhosis of the liver  -Hallmarks of CLD present including pancytopenia, coagulopathy, transaminitis, hyperbilirubinemia  -Lasix and spironolactone are on hold due to dehydration  -Lactulose  -GI follow up in the out patient    Elevated anion gap metabolic acidosis  -Due to lactic acidosis of 6.6 the setting of cirrhosis  -IV hydration  -Monitor with BMP    DVT prophylaxis: SCD in the setting of thrombocytopenia, coagulopathy and increased risk of bleeding        Signed:  Estefania Delgado MD 1/23/2022

## 2022-01-24 VITALS
WEIGHT: 141.76 LBS | RESPIRATION RATE: 16 BRPM | OXYGEN SATURATION: 99 % | BODY MASS INDEX: 22.78 KG/M2 | TEMPERATURE: 98.1 F | HEART RATE: 103 BPM | SYSTOLIC BLOOD PRESSURE: 95 MMHG | DIASTOLIC BLOOD PRESSURE: 65 MMHG | HEIGHT: 66 IN

## 2022-01-24 LAB
ALBUMIN SERPL-MCNC: 2 G/DL (ref 3.5–5)
ALBUMIN/GLOB SERPL: 0.5 {RATIO} (ref 1.1–2.2)
ALP SERPL-CCNC: 157 U/L (ref 45–117)
ALT SERPL-CCNC: 36 U/L (ref 12–78)
ANION GAP SERPL CALC-SCNC: 3 MMOL/L (ref 5–15)
AST SERPL W P-5'-P-CCNC: 149 U/L (ref 15–37)
BILIRUB DIRECT SERPL-MCNC: 1.8 MG/DL (ref 0–0.2)
BILIRUB SERPL-MCNC: 2.9 MG/DL (ref 0.2–1)
BUN SERPL-MCNC: 6 MG/DL (ref 6–20)
BUN/CREAT SERPL: 12 (ref 12–20)
CA-I BLD-MCNC: 8.2 MG/DL (ref 8.5–10.1)
CHLORIDE SERPL-SCNC: 111 MMOL/L (ref 97–108)
CO2 SERPL-SCNC: 24 MMOL/L (ref 21–32)
CREAT SERPL-MCNC: 0.51 MG/DL (ref 0.55–1.02)
ERYTHROCYTE [DISTWIDTH] IN BLOOD BY AUTOMATED COUNT: 17.9 % (ref 11.5–14.5)
GLOBULIN SER CALC-MCNC: 4.4 G/DL (ref 2–4)
GLUCOSE SERPL-MCNC: 81 MG/DL (ref 65–100)
HCT VFR BLD AUTO: 28.2 % (ref 35–47)
HGB BLD-MCNC: 9.1 G/DL (ref 11.5–16)
MAGNESIUM SERPL-MCNC: 2 MG/DL (ref 1.6–2.4)
MCH RBC QN AUTO: 32.6 PG (ref 26–34)
MCHC RBC AUTO-ENTMCNC: 32.3 G/DL (ref 30–36.5)
MCV RBC AUTO: 101.1 FL (ref 80–99)
NRBC # BLD: 0 K/UL (ref 0–0.01)
NRBC BLD-RTO: 0 PER 100 WBC
PLATELET # BLD AUTO: 71 K/UL (ref 150–400)
PMV BLD AUTO: 10.2 FL (ref 8.9–12.9)
POTASSIUM SERPL-SCNC: 3.7 MMOL/L (ref 3.5–5.1)
PROT SERPL-MCNC: 6.4 G/DL (ref 6.4–8.2)
RBC # BLD AUTO: 2.79 M/UL (ref 3.8–5.2)
SODIUM SERPL-SCNC: 138 MMOL/L (ref 136–145)
WBC # BLD AUTO: 2.8 K/UL (ref 3.6–11)

## 2022-01-24 PROCEDURE — 74011250636 HC RX REV CODE- 250/636: Performed by: INTERNAL MEDICINE

## 2022-01-24 PROCEDURE — 80048 BASIC METABOLIC PNL TOTAL CA: CPT

## 2022-01-24 PROCEDURE — 74011250637 HC RX REV CODE- 250/637: Performed by: INTERNAL MEDICINE

## 2022-01-24 PROCEDURE — 80076 HEPATIC FUNCTION PANEL: CPT

## 2022-01-24 PROCEDURE — 74011250637 HC RX REV CODE- 250/637: Performed by: EMERGENCY MEDICINE

## 2022-01-24 PROCEDURE — 36415 COLL VENOUS BLD VENIPUNCTURE: CPT

## 2022-01-24 PROCEDURE — 83735 ASSAY OF MAGNESIUM: CPT

## 2022-01-24 PROCEDURE — 85027 COMPLETE CBC AUTOMATED: CPT

## 2022-01-24 RX ORDER — ASPIRIN 325 MG/1
100 TABLET, FILM COATED ORAL DAILY
Qty: 30 TABLET | Refills: 0 | Status: SHIPPED | OUTPATIENT
Start: 2022-01-25 | End: 2022-03-15 | Stop reason: SDUPTHER

## 2022-01-24 RX ADMIN — ACETAMINOPHEN 650 MG: 325 TABLET ORAL at 04:10

## 2022-01-24 RX ADMIN — LACTULOSE 15 ML: 20 SOLUTION ORAL at 09:02

## 2022-01-24 RX ADMIN — SODIUM CHLORIDE 100 ML/HR: 9 INJECTION, SOLUTION INTRAVENOUS at 04:10

## 2022-01-24 RX ADMIN — ACETAMINOPHEN 650 MG: 325 TABLET ORAL at 11:26

## 2022-01-24 RX ADMIN — POTASSIUM CHLORIDE 40 MEQ: 750 TABLET, FILM COATED, EXTENDED RELEASE ORAL at 09:02

## 2022-01-24 RX ADMIN — MULTIPLE VITAMINS W/ MINERALS TAB 1 TABLET: TAB at 09:02

## 2022-01-24 RX ADMIN — GABAPENTIN 100 MG: 100 CAPSULE ORAL at 09:02

## 2022-01-24 RX ADMIN — THIAMINE HCL TAB 100 MG 100 MG: 100 TAB at 09:02

## 2022-01-24 RX ADMIN — SODIUM CHLORIDE 1000 ML: 9 INJECTION, SOLUTION INTRAVENOUS at 09:13

## 2022-01-24 RX ADMIN — FOLIC ACID 1 MG: 1 TABLET ORAL at 09:02

## 2022-01-24 RX ADMIN — VENLAFAXINE HYDROCHLORIDE 150 MG: 75 CAPSULE, EXTENDED RELEASE ORAL at 09:02

## 2022-01-24 NOTE — DISCHARGE SUMMARY
Hospitalist discharge summary               Date of admission: 1/22/2022  Date of discharge: 1/24/2022      Reason for hospitalization and hospital course:   Natalia Mayer is a 28-year-old female with past medical history including hepatitis C, alcohol use disorder, cirrhosis of the liver. Patient presented to the emergency department on 1/22/22 complaining of generalized weakness, fatigue and shortness of breath and palpitations since the morning. Endorsed alcohol consumption, with last drink 2-3 day prior. Patient was tremulous and appeared anxious. Denied headache or hallucinations. Previously admitted in [facility] for dehydration and cirrhosis. During ED course on 1/22/22, patient was tachycardic, heart rate up to 125. laboratory derangements including pancytopenia, lactic acidosis, metabolic acidosis with serum bicarb of 15, elevated liver enzymes with ALT AST ratio>2, coagulopathy. Serum ammonia is 55. Patient alert and oriented. Patient ultimately admitted for further management of EtOH withdrawal, suspected cirrhosis of liver, and elevated anion gap metabolic acidosis. Patient was loaded with Ativan with IV Ativan prn as per CIWA protocol, also started on thiamine, folic acid, multivitamin, and IV hydration. Patient also given lactulose for hyperammonemia. Patient's aldactone and lasix held. 1/23/22, CIWA protocol continued, patient still tachycardic despite fluid resuscitation and normalization of lactic acid. US abdomen revealed hepatic steatosis and cholelithiasis. Patient continued on lactulose. Patient found with continued hypokalemia and magnesemia, repleted. Negative hepatitis panel.   ------  The patient is seen for follow up. Patient is alert and awake. Patient states she is feeling a little worse today, endorses low back pain, side pain, and bilateral hip pain today. Patient is still with mild nausea, a 3/10 headache, with mild anxiety.  Endorsees mild chest pain and shortness of breath, but the shortness of breath has improved since admission. She is also with abdominal pain, points to her right upper quadrant, but abdomen tender in LUQ on examination, negative Jo's sign. Patient had some palpitations overnight, but none today. Patient admits to history of EtOH abuse, states that she previously was drinking daily, but more recently only on the weekends (with beers and ~ 10 mixed drinks). Patient also states she is tachycardic at baseline, in the 120s, had been previously evaluated by cardiology and reports that they cleared her. Per RN, patient noted to be hypotensive at 89/57 at 0902, denied any dizziness. Ordered 1000ml bolus of NS. Repeat BP after bolus is 95/65. Patient mildly tachycardic at 106. Repeat potassium 3.7 and repeat magnesium 2.0 after repletion. , ALT 36. However, she is awake and alert, no tremor.   No evidence for significant alcohol withdrawal at this time    Problem List:  Problem List as of 1/24/2022 Date Reviewed: 12/10/2021          Codes Class Noted - Resolved    Alcohol withdrawal ([facility] ICD-10-CM: M82.330  ICD-9-CM: 291.81  1/22/2022 - Present        Dehydration ICD-10-CM: E86.0  ICD-9-CM: 276.51  1/22/2022 - Present        Lactic acidosis ICD-10-CM: E87.2  ICD-9-CM: 276.2  1/22/2022 - Present        Attention or concentration deficit ICD-10-CM: R41.840  ICD-9-CM: 799.51  12/11/2021 - Present        Neuropathy ICD-10-CM: G62.9  ICD-9-CM: 355.9  12/11/2021 - Present        Folate deficiency ICD-10-CM: E53.8  ICD-9-CM: 266.2  12/11/2021 - Present        Hypokalemia ICD-10-CM: E87.6  ICD-9-CM: 276.8  12/11/2021 - Present        Anemia ICD-10-CM: D64.9  ICD-9-CM: 285.9  12/11/2021 - Present        Alcoholic hepatitis with ascites ICD-10-CM: K70.11  ICD-9-CM: 571.1  12/11/2021 - Present        Thrombocytopenia ([facility] ICD-10-CM: D69.6  ICD-9-CM: 287.5  12/11/2021 - Present    Overview Signed 12/11/2021  9:01 PM by Jose Garrett MD Secondary to liver disease             Coagulopathy [facility]) ICD-10-CM: D68.9  ICD-9-CM: 286.9  12/11/2021 - Present    Overview Addendum 12/11/2021  9:01 PM by Martha Anderson MD     Secondary to liver disease. Received vitamin K in the hospital.             Polycystic ovarian syndrome ICD-10-CM: E28.2  ICD-9-CM: 256.4  Unknown - Present        Hepatitis C ICD-10-CM: B19.20  ICD-9-CM: 070.70  Unknown - Present        Depression ICD-10-CM: F32. A  ICD-9-CM: 385  Unknown - Present    Overview Signed 5/29/2019  7:02 PM by Jessica Brandt MD     Per records from [facility] 10/03/2018: patient had been on Venlafaxine  for depression.               PCOS (polycystic ovarian syndrome) ICD-10-CM: E28.2  ICD-9-CM: 256.4  Unknown - Present        Irregular periods ICD-10-CM: N92.6  ICD-9-CM: 626.4  7/31/2013 - Present              Medications reviewed  Current Facility-Administered Medications   Medication Dose Route Frequency    0.9% sodium chloride infusion  100 mL/hr IntraVENous CONTINUOUS    LORazepam (ATIVAN) injection 2 mg  2 mg IntraVENous Q1H PRN    lactulose (CHRONULAC) 10 gram/15 mL solution 15 mL  15 mL Oral BID    potassium chloride SR (KLOR-CON 10) tablet 40 mEq  40 mEq Oral DAILY    [Held by provider] furosemide (LASIX) tablet 40 mg  40 mg Oral DAILY    [Held by provider] spironolactone (ALDACTONE) tablet 50 mg  50 mg Oral DAILY    folic acid (FOLVITE) tablet 1 mg  1 mg Oral DAILY    gabapentin (NEURONTIN) capsule 100 mg  100 mg Oral BID    venlafaxine-SR (EFFEXOR-XR) capsule 150 mg  150 mg Oral DAILY    sodium chloride (NS) flush 5-40 mL  5-40 mL IntraVENous Q8H    sodium chloride (NS) flush 5-40 mL  5-40 mL IntraVENous PRN    acetaminophen (TYLENOL) tablet 650 mg  650 mg Oral Q6H PRN    Or    acetaminophen (TYLENOL) suppository 650 mg  650 mg Rectal Q6H PRN    polyethylene glycol (MIRALAX) packet 17 g  17 g Oral DAILY PRN    ondansetron (ZOFRAN ODT) tablet 4 mg  4 mg Oral Q8H PRN    Or    ondansetron (ZOFRAN) injection 4 mg  4 mg IntraVENous Q6H PRN    multivitamin, tx-iron-ca-min (THERA-M w/ IRON) tablet 1 Tablet  1 Tablet Oral DAILY    thiamine mononitrate (B-1) tablet 100 mg  100 mg Oral DAILY       Review of Systems:   A comprehensive review of systems was negative except for that written in the HPI. Objective:   Physical Exam:     Visit Vitals  BP 95/65   Pulse (!) 103   Temp 98.1 °F (36.7 °C)   Resp 16   Ht 5' 6\" (1.676 m)   Wt 64.3 kg (141 lb 12.1 oz)   SpO2 99%   Breastfeeding No   BMI 22.88 kg/m²      O2 Device: None (Room air)    Temp (24hrs), Av.8 °F (36.6 °C), Min:97.4 °F (36.3 °C), Max:98.1 °F (36.7 °C)    No intake/output data recorded.  1901 -  0700  In: 2772 [P.O.:360; I.V.:2412]  Out: -     General:   Awake and alert   Lungs:   Clear to auscultation bilaterally. Chest wall:  No tenderness or deformity. Heart:  Regular rhythm, tachycardic, S1, S2 normal, no murmur, click, rub or gallop. Abdomen:   Soft, mild tenderness noted to LUQ. Negative huerta sign. Bowel sounds normal. No masses,  No organomegaly. Extremities:  Scattered bruising noted to bilateral lower extremities   Pulses: 2+ and symmetric all extremities. Skin: Skin color, texture, turgor normal. No rashes or lesions   Neurologic: CNII-XII intact. No gross focal deficits.  No tremor noted         Data Review:       Recent Days:  Recent Labs     22  0611 22  0335 22  1606   WBC 2.8* 2.6* 2.4*   HGB 9.1* 9.0* 10.7*   HCT 28.2* 27.7* 33.3*   PLT 71* 71* 62*     Recent Labs     22  0611 22  0335 22  1758 22  1623 22  1606    138  --   --  138   K 3.7 3.2*  --   --  2.8*   * 108  --   --  105   CO2 24 25  --   --  15*   GLU 81 93  --   --  104*   BUN 6 6  --   --  7   CREA 0.51* 0.55  --   --  0.83   CA 8.2* 7.9*  --   --  8.3*   MG 2.0 1.5* 1.6  --   --    ALB 2.0*  --   --   --  2.4*   TBILI 2.9*  --   --   --  3.4* ALT 36  --   --   --  44   INR  --   --   --  2.0*  --      No results for input(s): PH, PCO2, PO2, HCO3, FIO2 in the last 72 hours. 24 Hour Results:  Recent Results (from the past 24 hour(s))   MAGNESIUM    Collection Time: 01/24/22  6:11 AM   Result Value Ref Range    Magnesium 2.0 1.6 - 2.4 mg/dL   METABOLIC PANEL, BASIC    Collection Time: 01/24/22  6:11 AM   Result Value Ref Range    Sodium 138 136 - 145 mmol/L    Potassium 3.7 3.5 - 5.1 mmol/L    Chloride 111 (H) 97 - 108 mmol/L    CO2 24 21 - 32 mmol/L    Anion gap 3 (L) 5 - 15 mmol/L    Glucose 81 65 - 100 mg/dL    BUN 6 6 - 20 mg/dL    Creatinine 0.51 (L) 0.55 - 1.02 mg/dL    BUN/Creatinine ratio 12 12 - 20      GFR est AA >60 >60 ml/min/1.73m2    GFR est non-AA >60 >60 ml/min/1.73m2    Calcium 8.2 (L) 8.5 - 10.1 mg/dL   CBC W/O DIFF    Collection Time: 01/24/22  6:11 AM   Result Value Ref Range    WBC 2.8 (L) 3.6 - 11.0 K/uL    RBC 2.79 (L) 3.80 - 5.20 M/uL    HGB 9.1 (L) 11.5 - 16.0 g/dL    HCT 28.2 (L) 35.0 - 47.0 %    .1 (H) 80.0 - 99.0 FL    MCH 32.6 26.0 - 34.0 PG    MCHC 32.3 30.0 - 36.5 g/dL    RDW 17.9 (H) 11.5 - 14.5 %    PLATELET 71 (L) 019 - 400 K/uL    MPV 10.2 8.9 - 12.9 FL    NRBC 0.0 0.0  WBC    ABSOLUTE NRBC 0.00 0.00 - 0.01 K/uL   HEPATIC FUNCTION PANEL    Collection Time: 01/24/22  6:11 AM   Result Value Ref Range    Protein, total 6.4 6.4 - 8.2 g/dL    Albumin 2.0 (L) 3.5 - 5.0 g/dL    Globulin 4.4 (H) 2.0 - 4.0 g/dL    A-G Ratio 0.5 (L) 1.1 - 2.2      Bilirubin, total 2.9 (H) 0.2 - 1.0 mg/dL    Bilirubin, direct 1.8 (H) 0.0 - 0.2 mg/dL    Alk. phosphatase 157 (H) 45 - 117 U/L    AST (SGOT) 149 (H) 15 - 37 U/L    ALT (SGPT) 36 12 - 78 U/L       CT HEAD WO CONT   Final Result   No acute intracranial abnormality. CTA CHEST W OR W WO CONT   Final Result   1. Negative for pulmonary embolism. 2. Hepatic steatosis. 3. Splenomegaly. US ABD LTD   Final Result   1. Hepatic steatosis. 2. Cholelithiasis. Current Discharge Medication List      START taking these medications    Details   thiamine mononitrate (B-1) 100 mg tablet Take 1 Tablet by mouth daily. Qty: 30 Tablet, Refills: 0  Start date: 1/25/2022         CONTINUE these medications which have NOT CHANGED    Details   Vitamin D2 1,250 mcg (50,000 unit) capsule take 1 capsule by mouth every week ON FRIDAY AT 9AM      folic acid (FOLVITE) 1 mg tablet Take 1 Tablet by mouth daily. Qty: 90 Tablet, Refills: 1    Associated Diagnoses: Folate deficiency      gabapentin (NEURONTIN) 100 mg capsule Take 1 Capsule by mouth two (2) times a day. Max Daily Amount: 200 mg. Qty: 180 Capsule, Refills: 0    Associated Diagnoses: Neuropathy      venlafaxine-SR (EFFEXOR-XR) 150 mg capsule Take 1 Capsule by mouth daily. Qty: 90 Capsule, Refills: 0    Associated Diagnoses: Depression, unspecified depression type      furosemide (LASIX) 40 mg tablet Take 1 Tablet by mouth daily. Qty: 90 Tablet, Refills: 0    Associated Diagnoses: Alcoholic hepatitis without ascites      spironolactone (ALDACTONE) 25 mg tablet take 2 tablets by mouth once daily as directed  Qty: 90 Tablet, Refills: 0    Associated Diagnoses: Alcoholic hepatitis without ascites         STOP taking these medications       ibuprofen (Motrin IB) 200 mg tablet Comments:   Reason for Stopping:         diclofenac (VOLTAREN) 1 % gel Comments:   Reason for Stopping:         lidocaine (LIDODERM) 5 % Comments:   Reason for Stopping:         potassium bicarb-citric acid (Effer-K) 20 mEq tablet Comments:   Reason for Stopping:               Assessment:  EtOH withdrawal, now stable    Elevated anion gap metabolic acidosis, resolved    Acute hypokalemia, resolved    Acute hypomagnesemia, resolved    History of hepatitis C, negative hepatitis panel    History of EtOH use disorder    History of cirrhosis of liver    Plan:   We will discharge patient home today    Complete cessation of alcohol use strongly advised    Care Plan discussed with: Patient/Family    Disposition: Discharge  Total time spent with patient: 30 minutes.     Joe Blanchard MD

## 2022-01-24 NOTE — PROGRESS NOTES
*ATTENTION:  This note has been created by a medical student for educational purposes only. Please do not refer to the content of this note for clinical decision-making, billing, or other purposes. Please see attending physicians note to obtain clinical information on this patient. *              Hospitalist Progress Note               Daily Progress Note: 1/24/2022      Subjective:   Juliet Dancer is a 22-year-old female with past medical history including hepatitis C, alcohol use disorder, cirrhosis of the liver. Patient presented to the emergency department on 1/22/22 complaining of generalized weakness, fatigue and shortness of breath and palpitations since the morning. Endorsed alcohol consumption, with last drink 2-3 day prior. Patient was tremulous and appeared anxious. Denied headache or hallucinations. Previously admitted in [facility] for dehydration and cirrhosis. During ED course on 1/22/22, patient was tachycardic, heart rate up to 125. laboratory derangements including pancytopenia, lactic acidosis, metabolic acidosis with serum bicarb of 15, elevated liver enzymes with ALT AST ratio>2, coagulopathy. Serum ammonia is 55. Patient alert and oriented. Patient ultimately admitted for further management of EtOH withdrawal, suspected cirrhosis of liver, and elevated anion gap metabolic acidosis. Patient was loaded with Ativan with IV Ativan prn as per CIWA protocol, also started on thiamine, folic acid, multivitamin, and IV hydration. Patient also given lactulose for hyperammonemia. Patient's aldactone and lasix held. 1/23/22, CIWA protocol continued, patient still tachycardic despite fluid resuscitation and normalization of lactic acid. US abdomen revealed hepatic steatosis and cholelithiasis. Patient continued on lactulose. Patient found with continued hypokalemia and magnesemia, repleted. Negative hepatitis panel.   ------  The patient is seen for follow up.  Patient is alert and awake. Patient states she is feeling a little worse today, endorses low back pain, side pain, and bilateral hip pain today. Patient is still with mild nausea, a 3/10 headache, with mild anxiety. Endorsees mild chest pain and shortness of breath, but the shortness of breath has improved since admission. She is also with abdominal pain, points to her right upper quadrant, but abdomen tender in LUQ on examination, negative Jo's sign. Patient had some palpitations overnight, but none today. Patient admits to history of EtOH abuse, states that she previously was drinking daily, but more recently only on the weekends (with beers and ~ 10 mixed drinks). Patient also states she is tachycardic at baseline, in the 120s, had been previously evaluated by cardiology and reports that they cleared her. Per RN, patient noted to be hypotensive at 89/57 at 0902, denied any dizziness. Ordered 1000ml bolus of NS. Repeat BP after bolus is 95/65. Patient remains tachycardic, at 106. Repeat potassium 3.7 and repeat magnesium 2.0 after repletion. , ALT 36.      Problem List:  Problem List as of 1/24/2022 Date Reviewed: 12/10/2021          Codes Class Noted - Resolved    Alcohol withdrawal ([facility] ICD-10-CM: W10.759  ICD-9-CM: 291.81  1/22/2022 - Present        Dehydration ICD-10-CM: E86.0  ICD-9-CM: 276.51  1/22/2022 - Present        Lactic acidosis ICD-10-CM: E87.2  ICD-9-CM: 276.2  1/22/2022 - Present        Attention or concentration deficit ICD-10-CM: R41.840  ICD-9-CM: 799.51  12/11/2021 - Present        Neuropathy ICD-10-CM: G62.9  ICD-9-CM: 355.9  12/11/2021 - Present        Folate deficiency ICD-10-CM: E53.8  ICD-9-CM: 266.2  12/11/2021 - Present        Hypokalemia ICD-10-CM: E87.6  ICD-9-CM: 276.8  12/11/2021 - Present        Anemia ICD-10-CM: D64.9  ICD-9-CM: 285.9  12/11/2021 - Present        Alcoholic hepatitis with ascites ICD-10-CM: K70.11  ICD-9-CM: 571.1  12/11/2021 - Present        Thrombocytopenia ([facility] ICD-10-CM: D69.6  ICD-9-CM: 287.5  12/11/2021 - Present    Overview Signed 12/11/2021  9:01 PM by Ghulam Corona MD     Secondary to liver disease             Coagulopathy [facility]) ICD-10-CM: D68.9  ICD-9-CM: 286.9  12/11/2021 - Present    Overview Addendum 12/11/2021  9:01 PM by Ghulam Corona MD     Secondary to liver disease. Received vitamin K in the hospital.             Polycystic ovarian syndrome ICD-10-CM: E28.2  ICD-9-CM: 256.4  Unknown - Present        Hepatitis C ICD-10-CM: B19.20  ICD-9-CM: 070.70  Unknown - Present        Depression ICD-10-CM: F32. A  ICD-9-CM: 140  Unknown - Present    Overview Signed 5/29/2019  7:02 PM by Remedios Lay MD     Per records from [facility] 10/03/2018: patient had been on Venlafaxine  for depression.               PCOS (polycystic ovarian syndrome) ICD-10-CM: E28.2  ICD-9-CM: 256.4  Unknown - Present        Irregular periods ICD-10-CM: N92.6  ICD-9-CM: 626.4  7/31/2013 - Present              Medications reviewed  Current Facility-Administered Medications   Medication Dose Route Frequency    0.9% sodium chloride infusion  100 mL/hr IntraVENous CONTINUOUS    LORazepam (ATIVAN) injection 2 mg  2 mg IntraVENous Q1H PRN    lactulose (CHRONULAC) 10 gram/15 mL solution 15 mL  15 mL Oral BID    potassium chloride SR (KLOR-CON 10) tablet 40 mEq  40 mEq Oral DAILY    [Held by provider] furosemide (LASIX) tablet 40 mg  40 mg Oral DAILY    [Held by provider] spironolactone (ALDACTONE) tablet 50 mg  50 mg Oral DAILY    folic acid (FOLVITE) tablet 1 mg  1 mg Oral DAILY    gabapentin (NEURONTIN) capsule 100 mg  100 mg Oral BID    venlafaxine-SR (EFFEXOR-XR) capsule 150 mg  150 mg Oral DAILY    sodium chloride (NS) flush 5-40 mL  5-40 mL IntraVENous Q8H    sodium chloride (NS) flush 5-40 mL  5-40 mL IntraVENous PRN    acetaminophen (TYLENOL) tablet 650 mg  650 mg Oral Q6H PRN    Or    acetaminophen (TYLENOL) suppository 650 mg  650 mg Rectal Q6H PRN    polyethylene glycol (MIRALAX) packet 17 g  17 g Oral DAILY PRN    ondansetron (ZOFRAN ODT) tablet 4 mg  4 mg Oral Q8H PRN    Or    ondansetron (ZOFRAN) injection 4 mg  4 mg IntraVENous Q6H PRN    multivitamin, tx-iron-ca-min (THERA-M w/ IRON) tablet 1 Tablet  1 Tablet Oral DAILY    thiamine mononitrate (B-1) tablet 100 mg  100 mg Oral DAILY       Review of Systems:   A comprehensive review of systems was negative except for that written in the HPI. Objective:   Physical Exam:     Visit Vitals  BP 95/65   Pulse (!) 103   Temp 98.1 °F (36.7 °C)   Resp 16   Ht 5' 6\" (1.676 m)   Wt 64.3 kg (141 lb 12.1 oz)   SpO2 99%   Breastfeeding No   BMI 22.88 kg/m²      O2 Device: None (Room air)    Temp (24hrs), Av.8 °F (36.6 °C), Min:97.4 °F (36.3 °C), Max:98.1 °F (36.7 °C)    No intake/output data recorded.  1901 -  0700  In: 2772 [P.O.:360; I.V.:2412]  Out: -     General:   Awake and alert   Lungs:   Clear to auscultation bilaterally. Chest wall:  No tenderness or deformity. Heart:  Regular rhythm, tachycardic, S1, S2 normal, no murmur, click, rub or gallop. Abdomen:   Soft, mild tenderness noted to LUQ. Negative huerta sign. Bowel sounds normal. No masses,  No organomegaly. Extremities:  Scattered bruising noted to bilateral lower extremities   Pulses: 2+ and symmetric all extremities. Skin: Skin color, texture, turgor normal. No rashes or lesions   Neurologic: CNII-XII intact. No gross focal deficits.  No tremor noted         Data Review:       Recent Days:  Recent Labs     22  0611 22  0335 22  1606   WBC 2.8* 2.6* 2.4*   HGB 9.1* 9.0* 10.7*   HCT 28.2* 27.7* 33.3*   PLT 71* 71* 62*     Recent Labs     22  0611 22  0335 22  1758 22  1623 22  1606    138  --   --  138   K 3.7 3.2*  --   --  2.8*   * 108  --   --  105   CO2 24 25  --   --  15*   GLU 81 93  --   --  104*   BUN 6 6  --   --  7   CREA 0.51* 0.55  --   --  0.83 CA 8.2* 7.9*  --   --  8.3*   MG 2.0 1.5* 1.6  --   --    ALB 2.0*  --   --   --  2.4*   TBILI 2.9*  --   --   --  3.4*   ALT 36  --   --   --  44   INR  --   --   --  2.0*  --      No results for input(s): PH, PCO2, PO2, HCO3, FIO2 in the last 72 hours. 24 Hour Results:  Recent Results (from the past 24 hour(s))   MAGNESIUM    Collection Time: 01/24/22  6:11 AM   Result Value Ref Range    Magnesium 2.0 1.6 - 2.4 mg/dL   METABOLIC PANEL, BASIC    Collection Time: 01/24/22  6:11 AM   Result Value Ref Range    Sodium 138 136 - 145 mmol/L    Potassium 3.7 3.5 - 5.1 mmol/L    Chloride 111 (H) 97 - 108 mmol/L    CO2 24 21 - 32 mmol/L    Anion gap 3 (L) 5 - 15 mmol/L    Glucose 81 65 - 100 mg/dL    BUN 6 6 - 20 mg/dL    Creatinine 0.51 (L) 0.55 - 1.02 mg/dL    BUN/Creatinine ratio 12 12 - 20      GFR est AA >60 >60 ml/min/1.73m2    GFR est non-AA >60 >60 ml/min/1.73m2    Calcium 8.2 (L) 8.5 - 10.1 mg/dL   CBC W/O DIFF    Collection Time: 01/24/22  6:11 AM   Result Value Ref Range    WBC 2.8 (L) 3.6 - 11.0 K/uL    RBC 2.79 (L) 3.80 - 5.20 M/uL    HGB 9.1 (L) 11.5 - 16.0 g/dL    HCT 28.2 (L) 35.0 - 47.0 %    .1 (H) 80.0 - 99.0 FL    MCH 32.6 26.0 - 34.0 PG    MCHC 32.3 30.0 - 36.5 g/dL    RDW 17.9 (H) 11.5 - 14.5 %    PLATELET 71 (L) 907 - 400 K/uL    MPV 10.2 8.9 - 12.9 FL    NRBC 0.0 0.0  WBC    ABSOLUTE NRBC 0.00 0.00 - 0.01 K/uL   HEPATIC FUNCTION PANEL    Collection Time: 01/24/22  6:11 AM   Result Value Ref Range    Protein, total 6.4 6.4 - 8.2 g/dL    Albumin 2.0 (L) 3.5 - 5.0 g/dL    Globulin 4.4 (H) 2.0 - 4.0 g/dL    A-G Ratio 0.5 (L) 1.1 - 2.2      Bilirubin, total 2.9 (H) 0.2 - 1.0 mg/dL    Bilirubin, direct 1.8 (H) 0.0 - 0.2 mg/dL    Alk. phosphatase 157 (H) 45 - 117 U/L    AST (SGOT) 149 (H) 15 - 37 U/L    ALT (SGPT) 36 12 - 78 U/L       CT HEAD WO CONT   Final Result   No acute intracranial abnormality. CTA CHEST W OR W WO CONT   Final Result   1. Negative for pulmonary embolism.    2. Hepatic steatosis. 3. Splenomegaly. US ABD LTD   Final Result   1. Hepatic steatosis. 2. Cholelithiasis. Assessment:  EtOH withdrawal    Steatosis of Liver    Elevated anion gap metabolic acidosis, resolved    Acute hypokalemia, resolved    Acute hypomagnesemia, resolved    History of hepatitis C, negative hepatitis panel    History of EtOH use disorder    History of cirrhosis of liver    Plan:  Continue CIWA protocols  Repeat ammonia levels  Continue Lactulose  Continue supportive care and fluids      Care Plan discussed with: Patient/Family    Disposition: Discharge  Total time spent with patient: 30 minutes.     Jorge L Diallo

## 2022-01-24 NOTE — PROGRESS NOTES
Reason for Admission:  Alcohol Withdrawal                     RUR Score:12%                    Plan for utilizing home health: Declines         PCP: First and Last name:  Ronald Brody MD     Name of Practice:    Are you a current patient: Yes/No:    Approximate date of last visit: 1 month ago   Can you participate in a virtual visit with your PCP:                     Current Advanced Directive/Advance Care Plan: Full Code      Healthcare Decision Maker:   Click here to complete [id number] including selection of the Healthcare Decision Maker Relationship (ie \"Primary\")           Spouse, Delano Argueta, [phone]                  Transition of Care Plan:                      Patient currently lives at home with her spouse. There are no steps or ramp to enter the home. Patient has no DME, and has never had HH, IRF or SNF services. Patient's spouse will be her ride home at discharge. Patient can transport herself to follow up appointments. Patient uses the [facility] on [place], [place]. Current Dispo: Home/Self. Discharge plan of care/case management plan validated with provider discharge order.

## 2022-01-24 NOTE — PROGRESS NOTES
Pt discharged home via personal vehicle with life partner. Pt denies any further questions regarding discharge. Pt stable upon discharge.

## 2022-01-29 LAB
BACTERIA SPEC CULT: NORMAL
SPECIAL REQUESTS,SREQ: NORMAL

## 2022-02-03 ENCOUNTER — OFFICE VISIT (OUTPATIENT)
Dept: FAMILY MEDICINE CLINIC | Age: 27
End: 2022-02-03
Payer: OTHER GOVERNMENT

## 2022-02-03 VITALS
DIASTOLIC BLOOD PRESSURE: 64 MMHG | WEIGHT: 141 LBS | RESPIRATION RATE: 14 BRPM | HEIGHT: 66 IN | BODY MASS INDEX: 22.66 KG/M2 | TEMPERATURE: 97.3 F | OXYGEN SATURATION: 99 % | HEART RATE: 109 BPM | SYSTOLIC BLOOD PRESSURE: 109 MMHG

## 2022-02-03 DIAGNOSIS — F32.A DEPRESSION, UNSPECIFIED DEPRESSION TYPE: ICD-10-CM

## 2022-02-03 DIAGNOSIS — K76.0 STEATOSIS OF LIVER: ICD-10-CM

## 2022-02-03 DIAGNOSIS — F10.939 ALCOHOL WITHDRAWAL SYNDROME WITH COMPLICATION (HCC): ICD-10-CM

## 2022-02-03 DIAGNOSIS — E83.42 HYPOMAGNESEMIA: ICD-10-CM

## 2022-02-03 DIAGNOSIS — D69.6 THROMBOCYTOPENIA (HCC): ICD-10-CM

## 2022-02-03 DIAGNOSIS — Z09 HOSPITAL DISCHARGE FOLLOW-UP: Primary | ICD-10-CM

## 2022-02-03 DIAGNOSIS — E87.6 HYPOKALEMIA: ICD-10-CM

## 2022-02-03 PROCEDURE — 99214 OFFICE O/P EST MOD 30 MIN: CPT | Performed by: STUDENT IN AN ORGANIZED HEALTH CARE EDUCATION/TRAINING PROGRAM

## 2022-02-03 PROCEDURE — 1111F DSCHRG MED/CURRENT MED MERGE: CPT | Performed by: STUDENT IN AN ORGANIZED HEALTH CARE EDUCATION/TRAINING PROGRAM

## 2022-02-03 RX ORDER — VENLAFAXINE HYDROCHLORIDE 150 MG/1
150 CAPSULE, EXTENDED RELEASE ORAL DAILY
Qty: 90 CAPSULE | Refills: 0 | Status: SHIPPED | OUTPATIENT
Start: 2022-02-03 | End: 2022-03-15 | Stop reason: SDUPTHER

## 2022-02-03 RX ORDER — VENLAFAXINE HYDROCHLORIDE 75 MG/1
75 CAPSULE, EXTENDED RELEASE ORAL DAILY
Qty: 90 CAPSULE | Refills: 0 | Status: SHIPPED | OUTPATIENT
Start: 2022-02-03 | End: 2022-04-25 | Stop reason: SDUPTHER

## 2022-02-03 NOTE — PROGRESS NOTES
Transitional Care Management Progress Note    Patient: Deepak Powers  : 1995  PCP: Tawanda Grace MD    Date of admission:   Date of discharge:     Patient was contacted by Transitional Care Management services within two days after her discharge: Yes. This encounter and supporting documentation was reviewed if available. Medication reconciliation was performed today (2/3/2022). Assessment/Plan:   Diagnoses and all orders for this visit:    1. Hospital discharge follow-up  -     OK DISCHARGE MEDS RECONCILED W/ CURRENT OUTPATIENT MED LIST    2. Depression, unspecified depression type  -     venlafaxine-SR (EFFEXOR-XR) 150 mg capsule; Take 1 Capsule by mouth daily. -     venlafaxine-SR (EFFEXOR-XR) 75 mg capsule; Take 1 Capsule by mouth daily. Total of 225 mg daily in combination with 15o capsule. 3. Thrombocytopenia (HCC)  -     CBC WITH AUTOMATED DIFF    4. Hypokalemia  -     METABOLIC PANEL, COMPREHENSIVE  -     MAGNESIUM    5. Hypomagnesemia  -     MAGNESIUM    6. Steatosis of liver    7. Alcohol withdrawal syndrome with complication ([facility] [address]       Plan: Increase Effexor due to depression. Labs ordered to follow-up lab abnormalities while in the hospital including hypokalemia, low magnesium, thrombocytopenia. Follow-up in 1 month for depression, and advised to make an appointment with the psychologist next door. Will make appointment with GI due to diffuse abdominal pain. I suspect abdominal pain is due to hepatosplenomegaly. May be due to some gastritis. Will consider trying Protonix next visit. Follow-up and Dispositions    · Return in about 1 month (around 3/3/2022) for Depression, Anxiety. Subjective:   Deepak Powers is a 27 y.o. female presenting today for follow-up after being discharged from [facility]. The discharge summary was reviewed or requested.   The main problem requiring admission was alcohol withdrawal.   Complications during admission: Elevated ammonia, hypokalemia, lactic acidosis. Interval history/Current status: Continues to have abdominal pain, but does have history of bilateral splenomegaly. Follows with Rosalio oscar. Also has history of hepatitis C, edema, thrombocytopenia, ascites, easy bruising. Admitting symptoms have: improved     Patient admits that she had stopped drinking alcohol in October but began drinking again in December due to family issues. Feels depressed. Denies any active SI. Has agreed to increase Effexor XR to 225mg daily. Continues to have some abdominal pain in the right upper quadrant, left upper quadrant, which radiates to the back. Overall states she has abdominal pain everywhere. Follows with GI. Has been given PPIs in the past for GERD. Had EGD done 6 months ago which did not show any abnormalities according to her. Medications marked \"taking\" at this time:  Current Outpatient Medications   Medication Sig    venlafaxine-SR (EFFEXOR-XR) 150 mg capsule Take 1 Capsule by mouth daily.  venlafaxine-SR (EFFEXOR-XR) 75 mg capsule Take 1 Capsule by mouth daily. Total of 225 mg daily in combination with 15o capsule.  thiamine mononitrate (B-1) 100 mg tablet Take 1 Tablet by mouth daily.  folic acid (FOLVITE) 1 mg tablet Take 1 Tablet by mouth daily.  gabapentin (NEURONTIN) 100 mg capsule Take 1 Capsule by mouth two (2) times a day. Max Daily Amount: 200 mg.  Vitamin D2 1,250 mcg (50,000 unit) capsule take 1 capsule by mouth every week ON FRIDAY AT 9AM (Patient not taking: Reported on 2/3/2022)    furosemide (LASIX) 40 mg tablet Take 1 Tablet by mouth daily. (Patient not taking: Reported on 2/3/2022)    spironolactone (ALDACTONE) 25 mg tablet take 2 tablets by mouth once daily as directed (Patient not taking: Reported on 1/23/2022)     No current facility-administered medications for this visit. Review of Systems   Constitutional: Negative for fever. Gastrointestinal: Positive for abdominal pain. Patient Active Problem List    Diagnosis Date Noted    Alcohol withdrawal ([facility] [address] 01/22/2022    Dehydration 01/22/2022    Lactic acidosis 01/22/2022    Attention or concentration deficit 12/11/2021    Neuropathy 12/11/2021    Folate deficiency 12/11/2021    Hypokalemia 12/11/2021    Anemia 71/44/6342    Alcoholic hepatitis with ascites 12/11/2021    Thrombocytopenia (HCC) 12/11/2021    Coagulopathy ([facility] [address] 12/11/2021    Polycystic ovarian syndrome     Hepatitis C     Depression     PCOS (polycystic ovarian syndrome)     Irregular periods 07/31/2013          Objective:   /64 (BP 1 Location: Left upper arm, BP Patient Position: Sitting)   Pulse (!) 109   Temp 97.3 °F (36.3 °C) (Axillary)   Resp 14   Ht 5' 6\" (1.676 m)   Wt 141 lb (64 kg)   SpO2 99%   BMI 22.76 kg/m²      Physical Exam  Constitutional:       Appearance: Normal appearance. Cardiovascular:      Rate and Rhythm: Regular rhythm. Tachycardia present. Pulmonary:      Effort: Pulmonary effort is normal.      Breath sounds: Normal breath sounds. Abdominal:      Tenderness: There is abdominal tenderness (Diffuse). Neurological:      Mental Status: She is alert. We discussed the expected course, resolution and complications of the diagnosis(es) in detail. Medication risks, benefits, costs, interactions, and alternatives were discussed as indicated. I advised her to contact the office if her condition worsens, changes or fails to improve as anticipated. She expressed understanding with the diagnosis(es) and plan.      Alli Díaz MD

## 2022-02-03 NOTE — PROGRESS NOTES
Chief Complaint   Patient presents with   [facility] Follow Up     d/c 1/24/2022     Visit Vitals  /64 (BP 1 Location: Left upper arm, BP Patient Position: Sitting)   Pulse (!) 109   Temp 97.3 °F (36.3 °C) (Axillary)   Resp 14   Ht 5' 6\" (1.676 m)   Wt 141 lb (64 kg)   SpO2 99%   BMI 22.76 kg/m²     1. Have you been to the ER, urgent care clinic since your last visit? Hospitalized since your last visit? Yes When: [facility] 01/22/2022    2. Have you seen or consulted any other health care providers outside of the [address] [place] [address] since your last visit? Include any pap smears or colon screening.  No

## 2022-02-05 LAB
ALBUMIN SERPL-MCNC: 3 G/DL (ref 3.9–5)
ALBUMIN/GLOB SERPL: 0.7 {RATIO} (ref 1.2–2.2)
ALP SERPL-CCNC: 135 IU/L (ref 44–121)
ALT SERPL-CCNC: 28 IU/L (ref 0–32)
AST SERPL-CCNC: 122 IU/L (ref 0–40)
BASOPHILS # BLD AUTO: 0 X10E3/UL (ref 0–0.2)
BASOPHILS NFR BLD AUTO: 1 %
BILIRUB SERPL-MCNC: 3.4 MG/DL (ref 0–1.2)
BUN SERPL-MCNC: 7 MG/DL (ref 6–20)
BUN/CREAT SERPL: 11 (ref 9–23)
CALCIUM SERPL-MCNC: 8.3 MG/DL (ref 8.7–10.2)
CHLORIDE SERPL-SCNC: 105 MMOL/L (ref 96–106)
CO2 SERPL-SCNC: 25 MMOL/L (ref 20–29)
CREAT SERPL-MCNC: 0.66 MG/DL (ref 0.57–1)
EOSINOPHIL # BLD AUTO: 0.1 X10E3/UL (ref 0–0.4)
EOSINOPHIL NFR BLD AUTO: 2 %
ERYTHROCYTE [DISTWIDTH] IN BLOOD BY AUTOMATED COUNT: 13.6 % (ref 11.7–15.4)
GLOBULIN SER CALC-MCNC: 4.3 G/DL (ref 1.5–4.5)
GLUCOSE SERPL-MCNC: 79 MG/DL (ref 65–99)
HCT VFR BLD AUTO: 29.7 % (ref 34–46.6)
HGB BLD-MCNC: 10.8 G/DL (ref 11.1–15.9)
IMM GRANULOCYTES # BLD AUTO: 0 X10E3/UL (ref 0–0.1)
IMM GRANULOCYTES NFR BLD AUTO: 0 %
LYMPHOCYTES # BLD AUTO: 1.2 X10E3/UL (ref 0.7–3.1)
LYMPHOCYTES NFR BLD AUTO: 33 %
MAGNESIUM SERPL-MCNC: 2 MG/DL (ref 1.6–2.3)
MCH RBC QN AUTO: 32.1 PG (ref 26.6–33)
MCHC RBC AUTO-ENTMCNC: 36.4 G/DL (ref 31.5–35.7)
MCV RBC AUTO: 88 FL (ref 79–97)
MONOCYTES # BLD AUTO: 0.3 X10E3/UL (ref 0.1–0.9)
MONOCYTES NFR BLD AUTO: 8 %
MORPHOLOGY BLD-IMP: ABNORMAL
NEUTROPHILS # BLD AUTO: 2.1 X10E3/UL (ref 1.4–7)
NEUTROPHILS NFR BLD AUTO: 56 %
PLATELET # BLD AUTO: 98 X10E3/UL (ref 150–450)
POTASSIUM SERPL-SCNC: 3.8 MMOL/L (ref 3.5–5.2)
PROT SERPL-MCNC: 7.3 G/DL (ref 6–8.5)
RBC # BLD AUTO: 3.36 X10E6/UL (ref 3.77–5.28)
SODIUM SERPL-SCNC: 141 MMOL/L (ref 134–144)
WBC # BLD AUTO: 3.7 X10E3/UL (ref 3.4–10.8)

## 2022-02-07 NOTE — PROGRESS NOTES
Good evening,    Your labs have all improved. 1)Your platelet count continues low and 98 but overall has improved compared to when it was checked 2 weeks ago and was 71.    2) anemia has improved as well as her hemoglobin is almost normal at 10.8. 3) potassium is normal now at 3.8, and magnesium is normal at 2.0.    4) liver enzymes have improved as well as her AST is 122 was previously 149. This continues eye but important to continue staying off alcohol.   Result note sent via [employer]:

## 2022-03-03 DIAGNOSIS — G62.9 NEUROPATHY: ICD-10-CM

## 2022-03-04 RX ORDER — GABAPENTIN 100 MG/1
100 CAPSULE ORAL 2 TIMES DAILY
Qty: 180 CAPSULE | Refills: 2 | Status: SHIPPED | OUTPATIENT
Start: 2022-03-04 | End: 2022-10-19

## 2022-03-07 ENCOUNTER — OFFICE VISIT (OUTPATIENT)
Dept: FAMILY MEDICINE CLINIC | Age: 27
End: 2022-03-07
Payer: OTHER GOVERNMENT

## 2022-03-07 VITALS
BODY MASS INDEX: 21.69 KG/M2 | TEMPERATURE: 98.4 F | RESPIRATION RATE: 16 BRPM | HEIGHT: 66 IN | OXYGEN SATURATION: 99 % | WEIGHT: 135 LBS | DIASTOLIC BLOOD PRESSURE: 68 MMHG | SYSTOLIC BLOOD PRESSURE: 114 MMHG | HEART RATE: 119 BPM

## 2022-03-07 DIAGNOSIS — R00.0 TACHYCARDIA: ICD-10-CM

## 2022-03-07 DIAGNOSIS — F32.A DEPRESSION, UNSPECIFIED DEPRESSION TYPE: ICD-10-CM

## 2022-03-07 DIAGNOSIS — G47.00 INSOMNIA, UNSPECIFIED TYPE: Primary | ICD-10-CM

## 2022-03-07 PROCEDURE — 99214 OFFICE O/P EST MOD 30 MIN: CPT | Performed by: STUDENT IN AN ORGANIZED HEALTH CARE EDUCATION/TRAINING PROGRAM

## 2022-03-07 RX ORDER — PANTOPRAZOLE SODIUM 40 MG/1
40 TABLET, DELAYED RELEASE ORAL
COMMUNITY
Start: 2022-02-15 | End: 2022-07-28 | Stop reason: ALTCHOICE

## 2022-03-07 RX ORDER — TRAZODONE HYDROCHLORIDE 50 MG/1
50 TABLET ORAL
Qty: 90 TABLET | Refills: 0 | Status: SHIPPED | OUTPATIENT
Start: 2022-03-07 | End: 2022-05-12 | Stop reason: SDUPTHER

## 2022-03-07 NOTE — PROGRESS NOTES
Chief Complaint   Patient presents with    Follow-up    Anxiety    Depression     Visit Vitals  /68 (BP 1 Location: Left upper arm, BP Patient Position: Sitting)   Pulse (!) 119   Temp 98.4 °F (36.9 °C) (Axillary)   Resp 16   Ht 5' 6\" (1.676 m)   Wt 135 lb (61.2 kg)   SpO2 99%   BMI 21.79 kg/m²     1. Have you been to the ER, urgent care clinic since your last visit? Hospitalized since your last visit? No    2. Have you seen or consulted any other health care providers outside of the [address] [place] [address] since your last visit? Include any pap smears or colon screening.  No

## 2022-03-07 NOTE — PROGRESS NOTES
Subjective:     Chief Complaint   Patient presents with    Follow-up    Anxiety    Depression     HPI:  Siddharth Brewster is a 27 y.o. female. Follow-up of depression. Last visit raloxifene was increased to 225 mg daily. Since then feeling depression has improved that she feels more motivation. Denies side effects of medication. She has not seen psychologist yet. Patient is tired in the day and is not sleeping well at night. States she is sleeping (3 hours a day has trouble falling and staying asleep. During the day she will nap for about 40 minutes which resolved sleep couch. Has taken multiple over-the-counter medications including NyQuil, melatonin. She has been having trouble with sleeping for the last 2 months and that is when she stopped drinking alcohol. Patient Active Problem List    Diagnosis    Alcohol withdrawal ([address]    Dehydration    Lactic acidosis    Attention or concentration deficit    Neuropathy    Folate deficiency    Hypokalemia    Anemia    Alcoholic hepatitis with ascites    Thrombocytopenia ([address]     Secondary to liver disease      Coagulopathy ([address]     Secondary to liver disease. Received vitamin K in the hospital.      Polycystic ovarian syndrome    Hepatitis C    Depression     Per records from [facility] 10/03/2018: patient had been on Venlafaxine  for depression.        PCOS (polycystic ovarian syndrome)    Irregular periods     Past Medical History:   Diagnosis Date    Depression     Eczema     Hepatitis C     PCOS (polycystic ovarian syndrome)     Polycystic ovarian syndrome      Family History   Problem Relation Age of Onset    Alcohol abuse Father     Diabetes Maternal Grandfather     Other Maternal Grandmother         macular degeneration    Cancer Maternal Grandmother         skin    Depression Mother     Atopy Sister     No Known Problems Brother     No Known Problems Paternal Grandmother     No Known Problems Paternal Grandfather     Heart Disease Neg Hx     Hypertension Neg Hx       reports that she has been smoking cigarettes. She has a 0.50 pack-year smoking history. She has never used smokeless tobacco. She reports current alcohol use. She reports current drug use. Frequency: 1.00 time per week. Drug: Marijuana. Current Outpatient Medications on File Prior to Visit   Medication Sig Dispense Refill    pantoprazole (PROTONIX) 40 mg tablet Take 40 mg by mouth Daily (before breakfast).  gabapentin (NEURONTIN) 100 mg capsule Take 1 Capsule by mouth two (2) times a day. Max Daily Amount: 200 mg. 180 Capsule 2    venlafaxine-SR (EFFEXOR-XR) 150 mg capsule Take 1 Capsule by mouth daily. 90 Capsule 0    venlafaxine-SR (EFFEXOR-XR) 75 mg capsule Take 1 Capsule by mouth daily. Total of 225 mg daily in combination with 15o capsule. 90 Capsule 0    thiamine mononitrate (B-1) 100 mg tablet Take 1 Tablet by mouth daily. 30 Tablet 0    folic acid (FOLVITE) 1 mg tablet Take 1 Tablet by mouth daily. 90 Tablet 1    Vitamin D2 1,250 mcg (50,000 unit) capsule take 1 capsule by mouth every week ON FRIDAY AT 9AM (Patient not taking: Reported on 2/3/2022)      furosemide (LASIX) 40 mg tablet Take 1 Tablet by mouth daily. (Patient not taking: Reported on 2/3/2022) 90 Tablet 0    spironolactone (ALDACTONE) 25 mg tablet take 2 tablets by mouth once daily as directed (Patient not taking: Reported on 1/23/2022) 90 Tablet 0     No current facility-administered medications on file prior to visit. Allergies   Allergen Reactions    Sulfa (Sulfonamide Antibiotics) Anaphylaxis, Hives and Shortness of Breath     Review of Systems   All other systems reviewed and are negative.       Objective:     Vitals:    03/07/22 1607   BP: 114/68   Pulse: (!) 119   Resp: 16   Temp: 98.4 °F (36.9 °C)   TempSrc: Axillary   SpO2: 99%   Weight: 135 lb (61.2 kg)   Height: 5' 6\" (1.676 m)     Physical Exam  Cardiovascular:      Rate and Rhythm: Tachycardia present. Assessment/Plan:         1. Insomnia, unspecified type        -     patient with insomnia since stopping alcohol, and OTC medications have not helped. Will trial trazodone. If symptoms do not improve or 50 mg she is to try 100 mg daily and let me know. -     traZODone (DESYREL) 50 mg tablet; Take 1 Tablet by mouth nightly. 2. Depression, unspecified depression type  -Symptoms have improved since increasing Effexor to 225 mg daily. Continue current management. Advised to make appointment with psychologist next door as we had discussed last visit. 3.  Tachycardia  -This is chronic for her. Work-up by cardiology was unremarkable. She was congratulated on a great job as she has been abstaining from alcohol for the last 2 months. Follow-up and Dispositions    · Return in about 6 months (around 9/7/2022).           Chandni White MD

## 2022-03-15 DIAGNOSIS — E53.8 FOLATE DEFICIENCY: ICD-10-CM

## 2022-03-15 DIAGNOSIS — F32.A DEPRESSION, UNSPECIFIED DEPRESSION TYPE: ICD-10-CM

## 2022-03-17 RX ORDER — VENLAFAXINE HYDROCHLORIDE 150 MG/1
150 CAPSULE, EXTENDED RELEASE ORAL DAILY
Qty: 90 CAPSULE | Refills: 1 | Status: SHIPPED | OUTPATIENT
Start: 2022-03-17 | End: 2022-10-19 | Stop reason: SDUPTHER

## 2022-03-17 RX ORDER — FOLIC ACID 1 MG/1
1 TABLET ORAL DAILY
Qty: 90 TABLET | Refills: 1 | Status: SHIPPED | OUTPATIENT
Start: 2022-03-17 | End: 2022-07-28

## 2022-03-17 RX ORDER — ASPIRIN 325 MG/1
100 TABLET, FILM COATED ORAL DAILY
Qty: 90 TABLET | Refills: 1 | Status: SHIPPED | OUTPATIENT
Start: 2022-03-17 | End: 2022-07-28 | Stop reason: ALTCHOICE

## 2022-03-18 PROBLEM — D64.9 ANEMIA: Status: ACTIVE | Noted: 2021-12-11

## 2022-03-19 PROBLEM — E86.0 DEHYDRATION: Status: ACTIVE | Noted: 2022-01-22

## 2022-03-19 PROBLEM — D68.9 COAGULOPATHY (HCC): Status: ACTIVE | Noted: 2021-12-11

## 2022-03-19 PROBLEM — R00.0 TACHYCARDIA: Status: ACTIVE | Noted: 2022-03-07

## 2022-03-19 PROBLEM — G47.00 INSOMNIA: Status: ACTIVE | Noted: 2022-03-07

## 2022-03-19 PROBLEM — G62.9 NEUROPATHY: Status: ACTIVE | Noted: 2021-12-11

## 2022-03-19 PROBLEM — E87.6 HYPOKALEMIA: Status: ACTIVE | Noted: 2021-12-11

## 2022-03-19 PROBLEM — F10.939 ALCOHOL WITHDRAWAL (HCC): Status: ACTIVE | Noted: 2022-01-22

## 2022-03-20 PROBLEM — E87.20 LACTIC ACIDOSIS: Status: ACTIVE | Noted: 2022-01-22

## 2022-03-20 PROBLEM — R41.840 ATTENTION OR CONCENTRATION DEFICIT: Status: ACTIVE | Noted: 2021-12-11

## 2022-03-20 PROBLEM — K70.11 ALCOHOLIC HEPATITIS WITH ASCITES: Status: ACTIVE | Noted: 2021-12-11

## 2022-03-20 PROBLEM — E53.8 FOLATE DEFICIENCY: Status: ACTIVE | Noted: 2021-12-11

## 2022-03-20 PROBLEM — D69.6 THROMBOCYTOPENIA (HCC): Status: ACTIVE | Noted: 2021-12-11

## 2022-04-21 ENCOUNTER — OFFICE VISIT (OUTPATIENT)
Dept: HEMATOLOGY | Age: 27
End: 2022-04-21
Payer: OTHER GOVERNMENT

## 2022-04-21 VITALS
OXYGEN SATURATION: 100 % | DIASTOLIC BLOOD PRESSURE: 63 MMHG | HEART RATE: 99 BPM | WEIGHT: 143.3 LBS | RESPIRATION RATE: 16 BRPM | BODY MASS INDEX: 23.03 KG/M2 | SYSTOLIC BLOOD PRESSURE: 109 MMHG | HEIGHT: 66 IN | TEMPERATURE: 97.5 F

## 2022-04-21 DIAGNOSIS — K70.11 ALCOHOLIC HEPATITIS WITH ASCITES: Primary | ICD-10-CM

## 2022-04-21 PROCEDURE — 99204 OFFICE O/P NEW MOD 45 MIN: CPT | Performed by: INTERNAL MEDICINE

## 2022-04-21 RX ORDER — LANOLIN ALCOHOL/MO/W.PET/CERES
100 CREAM (GRAM) TOPICAL DAILY
COMMUNITY
Start: 2022-03-17 | End: 2022-04-21

## 2022-04-21 NOTE — Clinical Note
5/13/2022    Patient: Bhavin Valdez   YOB: 1995   Date of Visit: 4/21/2022     Shanell Chew MD  [address] [address]  [address] [address]    Dear Shanell Chew MD,      Thank you for referring Ms. Bhavin Valdez to [address] for evaluation. My notes for this consultation are attached. If you have questions, please do not hesitate to call me. I look forward to following your patient along with you.       Sincerely,    Guero Cooley MD

## 2022-04-21 NOTE — PROGRESS NOTES
[address] [facility], [place], [address], [facility], [place]      [facility], PAPARIS Jaramillo, ACNP-BC     Lima Xiao, [facility]   EBONI Woods-ORLIN Milian, [facility]       Maicol Bain [address]    at [address], [address] [place] [address], [place]  22. [phone]    FAX: [address] [place] [address]    at [address], [address]    [phone]    FAX: [phone]       Patient Care Team:  Cahntal Shaver MD as PCP - General (Family Medicine)  Chantal Shaver MD as PCP - [facility] Provider      Problem List  Date Reviewed: 5/13/2022          Codes Class Noted    Alcoholic liver disease ([facility] ICD-10-CM: K70.9  ICD-9-CM: 571.3  5/13/2022        Cirrhosis ([facility] ICD-10-CM: K74.60  ICD-9-CM: 571.5  5/13/2022        Ascites ICD-10-CM: R18.8  ICD-9-CM: 789.59  5/13/2022        Neuropathy ICD-10-CM: G62.9  ICD-9-CM: 355.9  12/11/2021        Thrombocytopenia ([facility] ICD-10-CM: D69.6  ICD-9-CM: 287.5  12/11/2021    Overview Signed 12/11/2021  9:01 PM by Chantal Shaver MD     Secondary to liver disease             Polycystic ovarian syndrome ICD-10-CM: E28.2  ICD-9-CM: 256.4  Unknown        Depression ICD-10-CM: F32. A  ICD-9-CM: 579  Unknown    Overview Signed 5/29/2019  7:02 PM by Meeun Montana     Per records from [facility] 10/03/2018: patient had been on Venlafaxine  for depression. The clinicians listed above have asked me to see Boris Theodore in consultation regarding management of cirrhosis secondary to alcohol. All medical records sent by the referring physicians were reviewed including imaging studies     The patient is a 27 y.o.  female who has consumed alcohol in excess over several years.   She was hospitalized with alcoholic hepatitis and ascites in 1/2022. She has been abstinent from alcohol for 2 weeks. The patient was found to have cirrhosis in 1/2022 when she developed ascites and was noted to have thrombocytopenia. An assessment of liver fibrosis with biopsy or elastography has not been performed. Serologic evaluation for markers of chronic liver disease was negative for HCV, HBV,     The patient has developed the following complications of cirrhosis: ascites,     The patient has the following symptoms which could be attributed to the liver disorder:    fatigue,   pain in the right side over the liver,     The patient is not experiencing the following symptoms which are commonly seen in this liver disorder:   problems concentrating,   swelling of the abdomen has resolved and she is no longer taking diuretics  swelling of the lower extremities,   hematemesis,   hematochezia. The patient has Mild limitations in functional activities which can be attributed to the liver disease       ASSESSMENT AND PLAN:  Cirrhosis  The diagnosis of cirrhosis is based upon laboratory studies, complications of cirrhosis. 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 The CTP is 6. Child class A. The MELD score is 10. Alcohol liver disease  The diagnosis is based upon a history of consuming significant alcohol on a daily basis for many years, imaging, pattern of AST>ALT, serology that is negative for other causes of chronic liver disease. The histologic severity has not been defined. Have performed laboratory testing to monitor liver function and degree of liver injury. This included BMP, hepatic panel, CBC with platelet count, INR. AST is elevated. ALT is normal.  ALP is normal.  Liver function is normal.  Total bilirubin is elevated. The platelet count is depressed.        The patient has been abstinent from alcohol since 4/2022  Discussed the need to remain abstinent from alcohol. If the patient cannot stop consuming alcohol then there is an alcohol abuse disorder and the patient should consider entering alcohol counseling and/or attend AA. The patient has an alcohol abuse disorder and it was suggested that they enter alcohol counseling and/or attend AA. Ascites   Ascites developed for the first time in 1/2022. Ascites has resolved with step 1 diuretics. Diuretics have been stopped and ascites has not recurred. The patient was counseled regarding the need to maintain sodium restriction and the types of foods containing high amounts of sodium to be avoided. Screening for Esophageal varices   The patient has not had an EGD to screen for varices. Will schedule for EGD to assess for varices     Hepatic encephalopathy   Overt HE has not developed to date. There is no reason for treatment with lactulose of xifaxan    Thrombocytopenia   This is secondary to cirrhosis. There is no evidence of overt bleeding. No treatment is required. The platelet count is adequate for the patient to undergo procedures without the need for platelet transfusion or platelet growth factors. Anemia   This is due to multifactorial causes including portal hypertension with chronic GI blood loss, bone marrow suppression secondary to malnutrition and alcohol. The most recent FE studies were from 4/2022. The serum ferritin is 77  The FE saturation is 54%    FE panel is within the normal range but this is low a patient with chronic liver disease and cirrhosis where the ferritin is elevated due to hepatic inflammation and the FE saturation is elevated due to reduced hepatic transferrin synthesis. Will administer intravenous iron. Screening for Hepatocellular Carcinoma  [address] screening was performed in 1/2022 and does not suggest [address] Will repeat the imaging study in 6 months.     Treatment of other medical problems in patients with chronic liver disease  There are no contraindications for the patient to take most medications that are necessary for treatment of other medical issues. The patient has cirrhrosis and should avoid taking NSAIDs which are associated with a higher rate of developing JAZZ. The patient consumes alcohol on a daily basis or has recently stopped consuming alcohol. Regular alcohol use increases the risk of toxicity from acetaminophen. This analgesic should be avoided until the patient has been abstinent from alcohol for 6 months. Osteoporosis  The risk of osteoporosis is increased in patients with cirrhosis. DEXA bone density to assess for osteoporosis has not been performed. This should be ordered by the patients primary care physician. Vaccinations   Vaccination for viral hepatitis B is recommended since the patient has no serologic evidence of previous exposure or vaccination with immunity. Vaccination for viral hepatitis A is not needed. The patient has serologic evidence of prior exposure or vaccination with immunity. The patient has received 2 doses of COVID-19 vaccine. Routine vaccinations against other bacterial and viral agents can be performed as indicated. Annual flu vaccination should be administered if indicated. ALLERGIES  Allergies   Allergen Reactions    Sulfa (Sulfonamide Antibiotics) Anaphylaxis, Hives and Shortness of Breath       MEDICATIONS  Current Outpatient Medications   Medication Sig    venlafaxine-SR (EFFEXOR-XR) 150 mg capsule Take 1 Capsule by mouth daily.  folic acid (FOLVITE) 1 mg tablet Take 1 Tablet by mouth daily.  thiamine mononitrate (B-1) 100 mg tablet Take 1 Tablet by mouth daily.  pantoprazole (PROTONIX) 40 mg tablet Take 40 mg by mouth Daily (before breakfast).  gabapentin (NEURONTIN) 100 mg capsule Take 1 Capsule by mouth two (2) times a day. Max Daily Amount: 200 mg.  traZODone (DESYREL) 100 mg tablet Take 1 Tablet by mouth nightly.     venlafaxine-SR (EFFEXOR-XR) 75 mg capsule Take 1 Capsule by mouth daily. Total of 225 mg daily in combination with 15o capsule. No current facility-administered medications for this visit. SYSTEM REVIEW NOT RELATED TO LIVER DISEASE OR REVIEWED ABOVE:  Constitution systems: Negative for fever, chills, weight gain, weight loss. Eyes: Negative for visual changes. ENT: Negative for sore throat, painful swallowing. Respiratory: Negative for cough, hemoptysis, SOB. Cardiology: Negative for chest pain, palpitations. GI:  Negative for constipation or diarrhea. : Negative for urinary frequency, dysuria, hematuria, nocturia. Skin: Negative for rash. Hematology: Negative for easy bruising, blood clots. Musculo-skelatal: Negative for back pain, muscle pain, weakness. Neurologic: Negative for headaches, dizziness, vertigo, memory problems not related to HE. Psychology: Negative for anxiety, depression. FAMILY HISTORY:  The father Has/had the following following chronic disease(s): alcohol abuse. The mother Has/had the following chronic disease(s): None. SOCIAL HISTORY:  The patient is . The patient has no children. The patient has never used tobacco products. The patient uses vapes. The patient has previously consumed alcohol in excess. The patient has been abstinent from alcohol since 4/2022. The patient currently works full time as [employer]. PHYSICAL EXAMINATION:  Visit Vitals  /63 (BP 1 Location: Right upper arm, BP Patient Position: Sitting, BP Cuff Size: Adult)   Pulse 99   Temp 97.5 °F (36.4 °C) (Temporal)   Resp 16   Ht 5' 6\" (1.676 m)   Wt 143 lb 4.8 oz (65 kg)   SpO2 100%   BMI 23.13 kg/m²     General: No acute distress. Eyes: Sclera anicteric. ENT: No oral lesions. Thyroid normal.  Nodes: No adenopathy. Skin: No spider angiomata. No jaundice. No palmar erythema. Respiratory: Lungs clear to auscultation. Cardiovascular: Regular heart rate. No murmurs.   No JVD.  Abdomen: Soft non-tender. Liver size normal to percussion/palpation. Spleen not palpable. No obvious ascites. Extremities: No edema. No muscle wasting. No gross arthritic changes. Neurologic: Alert and oriented. Cranial nerves grossly intact. No asterixis. LABORATORY STUDIES:  Liver [place] of [address] & Units 4/21/2022 2/4/2022   WBC 3.4 - 10.8 x10E3/uL 4.8 3.7   ANC 1.4 - 7.0 x10E3/uL 3.2 2.1   HGB 11.1 - 15.9 g/dL 10.6 (L) 10.8 (L)    - 450 x10E3/uL 109 (L) 98 (LL)   INR 0.9 - 1.2 1.3 (H)    AST 0 - 40 IU/L 44 (H) 122 (H)   ALT 0 - 32 IU/L 16 28   Alk Phos 44 - 121 IU/L 82 135 (H)   Bili, Total 0.0 - 1.2 mg/dL 2.2 (H) 3.4 (H)   Bili, Direct 0.00 - 0.40 mg/dL 0.69 (H)    Albumin 3.9 - 5.0 g/dL 4.2 3.0 (L)   BUN 6 - 20 mg/dL 7 7   Creat 0.57 - 1.00 mg/dL 0.78 0.66   Na 134 - 144 mmol/L 140 141   K 3.5 - 5.2 mmol/L 4.5 3.8   Cl 96 - 106 mmol/L 102 105   CO2 20 - 29 mmol/L 23 25   Glucose 65 - 99 mg/dL 72 79   Magnesium 1.6 - 2.3 mg/dL  2.0     SEROLOGIES:  Serologies Latest Ref Rng & Units 4/21/2022 1/23/2022   Hep A Ab, Total Negative Positive (A)    Hep B Surface Ag Index  <0.10   Hep B Surface Ag Interp Negative  Negative   Hep B Core Ab, Total Negative Negative    Hep B Surface AB QL  Non Reactive    Hep C Ab NONREACTIVE  NONREACTIVE   Ferritin 15 - 150 ng/mL 77    Iron % Saturation 15 - 55 % 54    MANI, IFA  Negative    ASMCA 0 - 19 Units 12    M2 Ab 0.0 - 20.0 Units <20.0    Ceruloplasmin 19.0 - 39.0 mg/dL 22.8    Alpha-1 antitrypsin level 100 - 188 mg/dL 217 (H)      LIVER HISTOLOGY:  Not available or performed    ENDOSCOPIC PROCEDURES:  Not available or performed    RADIOLOGY:  1/2022. Ultrasound of liver. Echogenic consistent with fatty liver. No liver mass lesions. No dilated bile ducts. No ascites. OTHER TESTING:  Not available or performed    FOLLOW-UP:  All of the issues listed above in the Assessment and Plan were discussed with the patient.   All questions were answered. The patient expressed a clear understanding of the above. [address] [place] [address] in 3 months for [facility] to review all data and determine the treatment plan.       Cony Starr MD  [address] 22.  [phone]  [address] [place] [address]

## 2022-04-21 NOTE — PROGRESS NOTES
Identified pt with two pt identifiers(name and ). Reviewed record in preparation for visit and have obtained necessary documentation. Chief Complaint   Patient presents with    Other     norm      Vitals:    22 1155   BP: 109/63   Pulse: 99   Resp: 16   Temp: 97.5 °F (36.4 °C)   TempSrc: Temporal   SpO2: 100%   Weight: 143 lb 4.8 oz (65 kg)   Height: 5' 6\" (1.676 m)   PainSc:   0 - No pain       Health Maintenance Review: Patient reminded of \"due or due soon\" health maintenance. I have asked the patient to contact his/her primary care provider (PCP) for follow-up on his/her health maintenance. Coordination of Care Questionnaire:  :   1) Have you been to an emergency room, urgent care, or hospitalized since your last visit? If yes, where when, and reason for visit? Yes, 3/22, [facility] for abnormal bleeding      2. Have seen or consulted any other health care provider since your last visit? If yes, where when, and reason for visit? NO      Patient is accompanied by self I have received verbal consent from Naty Montanez to discuss any/all medical information while they are present in the room.

## 2022-04-22 LAB
A1AT SERPL-MCNC: 217 MG/DL (ref 100–188)
ALBUMIN SERPL-MCNC: 4.2 G/DL (ref 3.9–5)
ALP SERPL-CCNC: 82 IU/L (ref 44–121)
ALT SERPL-CCNC: 16 IU/L (ref 0–32)
AST SERPL-CCNC: 44 IU/L (ref 0–40)
BASOPHILS # BLD AUTO: 0 X10E3/UL (ref 0–0.2)
BASOPHILS NFR BLD AUTO: 0 %
BILIRUB DIRECT SERPL-MCNC: 0.69 MG/DL (ref 0–0.4)
BILIRUB SERPL-MCNC: 2.2 MG/DL (ref 0–1.2)
BUN SERPL-MCNC: 7 MG/DL (ref 6–20)
BUN/CREAT SERPL: 9 (ref 9–23)
CALCIUM SERPL-MCNC: 9.9 MG/DL (ref 8.7–10.2)
CERULOPLASMIN SERPL-MCNC: 22.8 MG/DL (ref 19–39)
CHLORIDE SERPL-SCNC: 102 MMOL/L (ref 96–106)
CO2 SERPL-SCNC: 23 MMOL/L (ref 20–29)
CREAT SERPL-MCNC: 0.78 MG/DL (ref 0.57–1)
EGFR: 109 ML/MIN/1.73
EOSINOPHIL # BLD AUTO: 0.1 X10E3/UL (ref 0–0.4)
EOSINOPHIL NFR BLD AUTO: 2 %
ERYTHROCYTE [DISTWIDTH] IN BLOOD BY AUTOMATED COUNT: 12.2 % (ref 11.7–15.4)
FERRITIN SERPL-MCNC: 77 NG/ML (ref 15–150)
GLUCOSE SERPL-MCNC: 72 MG/DL (ref 65–99)
HAV AB SER QL IA: POSITIVE
HBV CORE AB SERPL QL IA: NEGATIVE
HBV SURFACE AB SER QL: NON REACTIVE
HCT VFR BLD AUTO: 30.3 % (ref 34–46.6)
HGB BLD-MCNC: 10.6 G/DL (ref 11.1–15.9)
IMM GRANULOCYTES # BLD AUTO: 0 X10E3/UL (ref 0–0.1)
IMM GRANULOCYTES NFR BLD AUTO: 0 %
INR PPP: 1.3 (ref 0.9–1.2)
IRON SATN MFR SERPL: 54 % (ref 15–55)
IRON SERPL-MCNC: 205 UG/DL (ref 27–159)
LYMPHOCYTES # BLD AUTO: 1 X10E3/UL (ref 0.7–3.1)
LYMPHOCYTES NFR BLD AUTO: 21 %
MCH RBC QN AUTO: 31.4 PG (ref 26.6–33)
MCHC RBC AUTO-ENTMCNC: 35 G/DL (ref 31.5–35.7)
MCV RBC AUTO: 90 FL (ref 79–97)
MITOCHONDRIA M2 IGG SER-ACNC: <20 UNITS (ref 0–20)
MONOCYTES # BLD AUTO: 0.5 X10E3/UL (ref 0.1–0.9)
MONOCYTES NFR BLD AUTO: 10 %
NEUTROPHILS # BLD AUTO: 3.2 X10E3/UL (ref 1.4–7)
NEUTROPHILS NFR BLD AUTO: 67 %
PLATELET # BLD AUTO: 109 X10E3/UL (ref 150–450)
POTASSIUM SERPL-SCNC: 4.5 MMOL/L (ref 3.5–5.2)
PROT SERPL-MCNC: 8.1 G/DL (ref 6–8.5)
PROTHROMBIN TIME: 13.5 SEC (ref 9.1–12)
RBC # BLD AUTO: 3.38 X10E6/UL (ref 3.77–5.28)
SODIUM SERPL-SCNC: 140 MMOL/L (ref 134–144)
TIBC SERPL-MCNC: 378 UG/DL (ref 250–450)
UIBC SERPL-MCNC: 173 UG/DL (ref 131–425)
WBC # BLD AUTO: 4.8 X10E3/UL (ref 3.4–10.8)

## 2022-04-23 LAB — ANA SER QL IF: NEGATIVE

## 2022-04-24 LAB — SMA IGG SER-ACNC: 12 UNITS (ref 0–19)

## 2022-04-25 DIAGNOSIS — F32.A DEPRESSION, UNSPECIFIED DEPRESSION TYPE: ICD-10-CM

## 2022-04-25 RX ORDER — VENLAFAXINE HYDROCHLORIDE 75 MG/1
75 CAPSULE, EXTENDED RELEASE ORAL DAILY
Qty: 90 CAPSULE | Refills: 1 | Status: SHIPPED | OUTPATIENT
Start: 2022-04-25

## 2022-05-12 ENCOUNTER — PATIENT MESSAGE (OUTPATIENT)
Dept: HEMATOLOGY | Age: 27
End: 2022-05-12

## 2022-05-12 DIAGNOSIS — G47.00 INSOMNIA, UNSPECIFIED TYPE: ICD-10-CM

## 2022-05-12 RX ORDER — TRAZODONE HYDROCHLORIDE 100 MG/1
100 TABLET ORAL
Qty: 90 TABLET | Refills: 1 | Status: SHIPPED | OUTPATIENT
Start: 2022-05-12 | End: 2022-08-03 | Stop reason: SDUPTHER

## 2022-05-13 PROBLEM — K70.9 ALCOHOLIC LIVER DISEASE (HCC): Status: ACTIVE | Noted: 2022-05-13

## 2022-05-13 PROBLEM — K74.60 CIRRHOSIS (HCC): Status: ACTIVE | Noted: 2022-05-13

## 2022-05-13 PROBLEM — R18.8 ASCITES: Status: ACTIVE | Noted: 2022-05-13

## 2022-05-13 PROBLEM — K70.11 ALCOHOLIC HEPATITIS WITH ASCITES: Status: RESOLVED | Noted: 2021-12-11 | Resolved: 2022-05-13

## 2022-05-13 NOTE — TELEPHONE ENCOUNTER
----- Message from Cony Starr MD sent at 5/13/2022  6:24 AM EDT -----  Regarding: Call with labs. Remain abstient from alcohol. Keep FU appt 3 months        [email or web address] Spoke w/patient labs reviewed. Remain abstient from alcohol. Keep follow up appt on 7/28/22 w/Linda. Patient verbalize understanding. (KF)

## 2022-07-28 ENCOUNTER — OFFICE VISIT (OUTPATIENT)
Dept: HEMATOLOGY | Age: 27
End: 2022-07-28
Payer: OTHER GOVERNMENT

## 2022-07-28 VITALS
OXYGEN SATURATION: 99 % | DIASTOLIC BLOOD PRESSURE: 65 MMHG | HEIGHT: 66 IN | BODY MASS INDEX: 22.18 KG/M2 | WEIGHT: 138 LBS | HEART RATE: 77 BPM | TEMPERATURE: 97.6 F | SYSTOLIC BLOOD PRESSURE: 108 MMHG

## 2022-07-28 DIAGNOSIS — K70.30 ALCOHOLIC CIRRHOSIS OF LIVER WITHOUT ASCITES (HCC): ICD-10-CM

## 2022-07-28 DIAGNOSIS — K70.11 ALCOHOLIC HEPATITIS WITH ASCITES: Primary | ICD-10-CM

## 2022-07-28 PROCEDURE — 99214 OFFICE O/P EST MOD 30 MIN: CPT | Performed by: PHYSICIAN ASSISTANT

## 2022-07-28 PROCEDURE — 91200 LIVER ELASTOGRAPHY: CPT | Performed by: PHYSICIAN ASSISTANT

## 2022-07-28 RX ORDER — FUROSEMIDE 20 MG/1
20 TABLET ORAL DAILY
Qty: 30 TABLET | Refills: 1 | Status: SHIPPED | OUTPATIENT
Start: 2022-07-28

## 2022-07-28 RX ORDER — NALTREXONE HYDROCHLORIDE 50 MG/1
25 TABLET, FILM COATED ORAL 2 TIMES DAILY
COMMUNITY

## 2022-07-28 RX ORDER — PROPRANOLOL HYDROCHLORIDE 10 MG/1
10 TABLET ORAL 2 TIMES DAILY
COMMUNITY

## 2022-07-28 NOTE — PROGRESS NOTES
[facility], MD, [address], [place], [place]      Maryann Causey, MADHURI Rueda, [facility]     Lima Xiao, [facility]   Kristy Arechigachester, CONSTANTINOP-ORLIN Calhoun, [facility]       Maicol Bain [address]    at [address], [address] Jennifer Harley  22.    [phone]    FAX: [address] [place] [address] Avenue    [address], [address]    [phone]    FAX: [phone]       Patient Care Team:  Virgil Yun MD as PCP - General (Family Medicine)  Virgil Yun MD as PCP - [facility] Provider      Problem List  Date Reviewed: 5/13/2022            Codes Class Noted    Alcoholic liver disease ([facility] ICD-10-CM: K70.9  ICD-9-CM: 571.3  5/13/2022        Cirrhosis ([facility] ICD-10-CM: K74.60  ICD-9-CM: 571.5  5/13/2022        Ascites ICD-10-CM: R18.8  ICD-9-CM: 789.59  5/13/2022        Neuropathy ICD-10-CM: G62.9  ICD-9-CM: 355.9  12/11/2021        Thrombocytopenia ([facility] ICD-10-CM: D69.6  ICD-9-CM: 287.5  12/11/2021    Overview Signed 12/11/2021  9:01 PM by Virgil Yun MD     Secondary to liver disease             Polycystic ovarian syndrome ICD-10-CM: E28.2  ICD-9-CM: 256.4  Unknown        Depression ICD-10-CM: F32. A  ICD-9-CM: 386  Unknown    Overview Signed 5/29/2019  7:02 PM by Misti Bonilla     Per records from [employer] 10/03/2018: patient had been on Venlafaxine  for depression. Efrem Infante is being seen at [place] [address] for management of cirrhosis secondary to alcoholic liver disease. The active problem list, all pertinent past medical history, medications, radiologic findings and laboratory findings related to the liver disorder were reviewed and discussed with the patient.       The patient is a 27 y.o.  female who has consumed alcohol in excess over several years. She was hospitalized with alcoholic hepatitis and ascites in 1/2022. She has been abstinent from alcohol since late 6/2022. She has been in rehab center at [facility] inpatient for the past ~30 days and is hoping for a 1-2 week extension. She has been doing reasonably well and feels she is gaining strength and perspective. She has remained on naltrexone. The patient was found to have cirrhosis in 1/2022 when she developed ascites and was noted to have thrombocytopenia. An assessment of liver fibrosis with biopsy or elastography has not been performed. We plan on obtaining Fibroscan today to verify baseline value. Serologic evaluation for markers of chronic liver disease was negative for HCV, HBV, inherited and autoimmune disease. The patient has developed the following complications of cirrhosis: ascites. The patient has the following symptoms which could be attributed to the liver disorder:    fatigue, pain in the right side over the liver. The patient is not experiencing the following symptoms which are commonly seen in this liver disorder:   problems concentrating, swelling of the abdomen has resolved and she is no longer taking diuretics swelling of the lower extremities, hematemesis, or hematochezia. The patient has Mild limitations in functional activities which can be attributed to the liver disease     ASSESSMENT AND PLAN:  Cirrhosis  The diagnosis of cirrhosis is based upon laboratory studies, complications of cirrhosis. Fibroscan in the office today shows a score of 45. 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 The CTP is 6. Child class A. The MELD score is 12 (4/2022). This will be rechecked today. I have reviewed her labs in detail with the patient and our hopes for seeing her condition stabilize. We will continue to follow her closely.      Alcohol liver disease  The diagnosis is based upon a history of consuming significant alcohol on a daily basis for many years, imaging, pattern of AST>ALT, serology that is negative for other causes of chronic liver disease. The histologic severity has not been defined. Have performed laboratory testing to monitor liver function and degree of liver injury. This included BMP, hepatic panel, CBC with platelet count, INR. AST is elevated. ALT is normal.  ALP is normal.  Liver function is normal.  Total bilirubin is elevated. The platelet count is depressed. The patient has been abstinent from alcohol since 6/2022  Discussed the need to remain abstinent from alcohol. She is presently committed to her recovery and is planning on intensive outpatient work as well. She understands that she has little functional margin and the absolute necessity of maintaining sobriety. Ascites   Ascites developed for the first time in 1/2022. Ascites has resolved with step 1 diuretics. Diuretics have been stopped and ascites has not recurred. LE edema  She has had some increased LE edema and I will give her a new prescription for furosemide 20 mg to utilize as needed. The patient was counseled regarding the need to maintain sodium restriction and the types of foods containing high amounts of sodium to be avoided. She has had a hard time controlling intake as she is inpatient recovery and eating prepared meals. Screening for Esophageal varices   The patient has not had an EGD to screen for varices. Will schedule for EGD to assess for varices, this has been ordered. Hepatic encephalopathy   Overt HE has not developed to date. There is no reason for treatment with lactulose of xifaxan    Thrombocytopenia   This is secondary to cirrhosis. There is no evidence of overt bleeding. No treatment is required.   The platelet count is adequate for the patient to undergo procedures without the need for platelet transfusion or platelet growth factors. Anemia   This is due to multifactorial causes including portal hypertension with chronic GI blood loss, bone marrow suppression secondary to malnutrition and alcohol. The most recent FE studies were from 4/2022. The serum ferritin is 77  The FE saturation is 54%  Past Fe panel is within the normal range but this is low a patient with chronic liver disease and cirrhosis where the ferritin is elevated due to hepatic inflammation and the FE saturation is elevated due to reduced hepatic transferrin synthesis. Will recheck her values today and make any further recommendations re oral or need for IV iron. Screening for Hepatocellular Carcinoma  [facility] screening was performed in 1/2022 and does not suggest [address] Will repeat the imaging study every 6 months and will obtain repeat in 9/2022. Treatment of other medical problems in patients with chronic liver disease  There are no contraindications for the patient to take most medications that are necessary for treatment of other medical issues. The patient has cirrhrosis and should avoid taking NSAIDs which are associated with a higher rate of developing JAZZ. The patient consumes alcohol on a daily basis or has recently stopped consuming alcohol. Regular alcohol use increases the risk of toxicity from acetaminophen. This analgesic should be avoided until the patient has been abstinent from alcohol for 6 months. Osteoporosis  The risk of osteoporosis is increased in patients with cirrhosis. DEXA bone density to assess for osteoporosis has not been performed. This should be ordered by the patients primary care physician. Vaccinations   Vaccination for viral hepatitis B is recommended since the patient has no serologic evidence of previous exposure or vaccination with immunity. Vaccination for viral hepatitis A is not needed. The patient has serologic evidence of prior exposure or vaccination with immunity.     The patient has received 2 doses of COVID-19 vaccine. Routine vaccinations against other bacterial and viral agents can be performed as indicated. Annual flu vaccination should be administered if indicated. ALLERGIES  Allergies   Allergen Reactions    Sulfa (Sulfonamide Antibiotics) Anaphylaxis, Hives and Shortness of Breath       MEDICATIONS  Current Outpatient Medications   Medication Sig    naltrexone (DEPADE) 50 mg tablet Take 25 mg by mouth two (2) times a day. propranoloL (INDERAL) 10 mg tablet Take 10 mg by mouth two (2) times a day. traZODone (DESYREL) 100 mg tablet Take 1 Tablet by mouth nightly. venlafaxine-SR (EFFEXOR-XR) 75 mg capsule Take 1 Capsule by mouth daily. Total of 225 mg daily in combination with 15o capsule. venlafaxine-SR (EFFEXOR-XR) 150 mg capsule Take 1 Capsule by mouth daily. folic acid (FOLVITE) 1 mg tablet Take 1 Tablet by mouth daily. thiamine mononitrate (B-1) 100 mg tablet Take 1 Tablet by mouth daily. pantoprazole (PROTONIX) 40 mg tablet Take 40 mg by mouth Daily (before breakfast). gabapentin (NEURONTIN) 100 mg capsule Take 1 Capsule by mouth two (2) times a day. Max Daily Amount: 200 mg. No current facility-administered medications for this visit. SYSTEM REVIEW NOT RELATED TO LIVER DISEASE OR REVIEWED ABOVE:  Constitution systems: Negative for fever, chills, weight gain, weight loss. Eyes: Negative for visual changes. ENT: Negative for sore throat, painful swallowing. Respiratory: Negative for cough, hemoptysis, SOB. Cardiology: Negative for chest pain, palpitations. GI:  Negative for constipation or diarrhea. : Negative for urinary frequency, dysuria, hematuria, nocturia. Skin: Negative for rash. Hematology: Negative for easy bruising, blood clots. Musculo-skeletal: Negative for back pain, muscle pain, weakness. Neurologic: Negative for headaches, dizziness, vertigo, memory problems not related to HE.   Psychology: Negative for anxiety, depression. FAMILY HISTORY:  The father Has/had the following following chronic disease(s): alcohol abuse. The mother Has/had the following chronic disease(s): None. SOCIAL HISTORY:  The patient is . The patient has no children. The patient has never used tobacco products. The patient uses vapes. The patient has previously consumed alcohol in excess. The patient has been abstinent from alcohol since 6/2022. 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 Presently out of work for recovery. PHYSICAL EXAMINATION:  Visit Vitals  /65 (BP 1 Location: Left upper arm, BP Patient Position: Sitting, BP Cuff Size: Adult)   Pulse 77   Temp 97.6 °F (36.4 °C) (Temporal)   Ht 5' 6\" (1.676 m)   Wt 138 lb (62.6 kg)   SpO2 99%   BMI 22.27 kg/m²     General: No acute distress. Eyes: Sclera anicteric. ENT: No oral lesions. Thyroid normal.  Nodes: No adenopathy. Skin: No spider angiomata. No jaundice. No palmar erythema. Respiratory: Lungs clear to auscultation. Cardiovascular: Regular heart rate. No murmurs. No JVD. Abdomen: Soft non-tender. Liver size normal to percussion/palpation. Spleen not palpable. No obvious ascites. Extremities: No edema. No muscle wasting. No gross arthritic changes. Neurologic: Alert and oriented. Cranial nerves grossly intact. No asterixis.     LABORATORY STUDIES:  Liver [place] of [address]  [address] & Units 4/21/2022 2/4/2022   WBC 3.4 - 10.8 x10E3/uL 4.8 3.7   ANC 1.4 - 7.0 x10E3/uL 3.2 2.1   HGB 11.1 - 15.9 g/dL 10.6 (L) 10.8 (L)    - 450 x10E3/uL 109 (L) 98 (LL)   INR 0.9 - 1.2 1.3 (H)    AST 0 - 40 IU/L 44 (H) 122 (H)   ALT 0 - 32 IU/L 16 28   Alk Phos 44 - 121 IU/L 82 135 (H)   Bili, Total 0.0 - 1.2 mg/dL 2.2 (H) 3.4 (H)   Bili, Direct 0.00 - 0.40 mg/dL 0.69 (H)    Albumin 3.9 - 5.0 g/dL 4.2 3.0 (L)   BUN 6 - 20 mg/dL 7 7   Creat 0.57 - 1.00 mg/dL 0.78 0.66   Na 134 - 144 mmol/L 140 141   K 3.5 - 5.2 mmol/L 4.5 3.8   Cl 96 - 106 mmol/L 102 105   CO2 20 - 29 mmol/L 23 25   Glucose 65 - 99 mg/dL 72 79   Magnesium 1.6 - 2.3 mg/dL  2.0   Additional lab values drawn at today's office visit are pending at the time of documentation. SEROLOGIES:  Serologies Latest Ref Rng & Units 4/21/2022 1/23/2022   Hep A Ab, Total Negative Positive (A)    Hep B Surface Ag Index  <0.10   Hep B Surface Ag Interp Negative  Negative   Hep B Core Ab, Total Negative Negative    Hep B Surface AB QL  Non Reactive    Hep C Ab NONREACTIVE  NONREACTIVE   Ferritin 15 - 150 ng/mL 77    Iron % Saturation 15 - 55 % 54    MANI, IFA  Negative    ASMCA 0 - 19 Units 12    M2 Ab 0.0 - 20.0 Units <20.0    Ceruloplasmin 19.0 - 39.0 mg/dL 22.8    Alpha-1 antitrypsin level 100 - 188 mg/dL 217 (H)      LIVER HISTOLOGY:  7/2022. [facility]. EkPa was 44.8. Suggested fibrosis level is F4. CAP score is 138. ENDOSCOPIC PROCEDURES:  Not available or performed    RADIOLOGY:  1/2022. Ultrasound of liver. Echogenic consistent with fatty liver. No liver mass lesions. No dilated bile ducts. No ascites. OTHER TESTING:  Not available or performed    FOLLOW-UP:  All of the issues listed above in the Assessment and Plan were discussed with the patient. All questions were answered. The patient expressed a clear understanding of the above. [address] [place] [address] in 2 months for office visit to monitor and same day US of the liver. EGD ordered as well. Documentation reviewed and updated to reflect current, accurate patient information.     Kasia Nair PA-C  Liver [facility], [address] [facility].  [phone]  [address] [facility]

## 2022-07-28 NOTE — PROGRESS NOTES
Identified pt with two pt identifiers(name and ). Reviewed record in preparation for visit and have obtained necessary documentation. Chief Complaint   Patient presents with    Cirrhosis Of Liver     FS 3month follow up with Paresh Goodwin:    22 1333   BP: 108/65   Pulse: 77   Temp: 97.6 °F (36.4 °C)   TempSrc: Temporal   SpO2: 99%   Weight: 138 lb (62.6 kg)   Height: 5' 6\" (1.676 m)   PainSc:   0 - No pain       Health Maintenance Review: Patient reminded of \"due or due soon\" health maintenance. I have asked the patient to contact his/her primary care provider (PCP) for follow-up on his/her health maintenance. Coordination of Care Questionnaire:  :   1) Have you been to an emergency room, urgent care, or hospitalized since your last visit? If yes, where when, and reason for visit? no       2. Have seen or consulted any other health care provider since your last visit? If yes, where when, and reason for visit? NO      Patient is accompanied by spouse I have received verbal consent from Vernon Patton to discuss any/all medical information while they are present in the room.

## 2022-07-29 LAB
ALBUMIN SERPL-MCNC: 4.1 G/DL (ref 3.5–5)
ALBUMIN/GLOB SERPL: 1 {RATIO} (ref 1.1–2.2)
ALP SERPL-CCNC: 84 U/L (ref 45–117)
ALT SERPL-CCNC: 33 U/L (ref 12–78)
ANION GAP SERPL CALC-SCNC: 9 MMOL/L (ref 5–15)
AST SERPL-CCNC: 44 U/L (ref 15–37)
BILIRUB DIRECT SERPL-MCNC: 0.5 MG/DL (ref 0–0.2)
BILIRUB SERPL-MCNC: 1.3 MG/DL (ref 0.2–1)
BUN SERPL-MCNC: 6 MG/DL (ref 6–20)
BUN/CREAT SERPL: 8 (ref 12–20)
CALCIUM SERPL-MCNC: 10 MG/DL (ref 8.5–10.1)
CHLORIDE SERPL-SCNC: 105 MMOL/L (ref 97–108)
CO2 SERPL-SCNC: 28 MMOL/L (ref 21–32)
CREAT SERPL-MCNC: 0.72 MG/DL (ref 0.55–1.02)
ERYTHROCYTE [DISTWIDTH] IN BLOOD BY AUTOMATED COUNT: 16.8 % (ref 11.5–14.5)
ETHANOL SERPL-MCNC: <10 MG/DL
FERRITIN SERPL-MCNC: 29 NG/ML (ref 26–388)
GLOBULIN SER CALC-MCNC: 4.2 G/DL (ref 2–4)
GLUCOSE SERPL-MCNC: 77 MG/DL (ref 65–100)
HCT VFR BLD AUTO: 40 % (ref 35–47)
HGB BLD-MCNC: 12.3 G/DL (ref 11.5–16)
INR PPP: 1.2 (ref 0.9–1.1)
IRON SATN MFR SERPL: 18 % (ref 20–50)
IRON SERPL-MCNC: 95 UG/DL (ref 35–150)
MCH RBC QN AUTO: 29.5 PG (ref 26–34)
MCHC RBC AUTO-ENTMCNC: 30.8 G/DL (ref 30–36.5)
MCV RBC AUTO: 95.9 FL (ref 80–99)
NRBC # BLD: 0 K/UL (ref 0–0.01)
NRBC BLD-RTO: 0 PER 100 WBC
PLATELET # BLD AUTO: 123 K/UL (ref 150–400)
PMV BLD AUTO: 11.6 FL (ref 8.9–12.9)
POTASSIUM SERPL-SCNC: 3.9 MMOL/L (ref 3.5–5.1)
PROT SERPL-MCNC: 8.3 G/DL (ref 6.4–8.2)
PROTHROMBIN TIME: 12.8 SEC (ref 9–11.1)
RBC # BLD AUTO: 4.17 M/UL (ref 3.8–5.2)
SODIUM SERPL-SCNC: 142 MMOL/L (ref 136–145)
TIBC SERPL-MCNC: 521 UG/DL (ref 250–450)
WBC # BLD AUTO: 5.7 K/UL (ref 3.6–11)

## 2022-08-01 NOTE — PROGRESS NOTES
Unable to reach pt via phone, [address] letter sent. Improvement with inpt rehab and 30+days sobriety. Plan follow-up in 2 months as scheduled. Will follow-up on AFP when available and plan repeat US at 9/2022 follow-up.

## 2022-08-03 DIAGNOSIS — G47.00 INSOMNIA, UNSPECIFIED TYPE: ICD-10-CM

## 2022-08-03 RX ORDER — TRAZODONE HYDROCHLORIDE 100 MG/1
100 TABLET ORAL
Qty: 90 TABLET | Refills: 1 | Status: SHIPPED | OUTPATIENT
Start: 2022-08-03

## 2022-08-05 LAB
AFP L3 MFR SERPL: NORMAL % (ref 0–9.9)
AFP SERPL-MCNC: 2.9 NG/ML (ref 0–4.7)

## 2022-08-19 ENCOUNTER — VIRTUAL VISIT (OUTPATIENT)
Dept: FAMILY MEDICINE CLINIC | Age: 27
End: 2022-08-19
Payer: OTHER GOVERNMENT

## 2022-08-19 DIAGNOSIS — L70.0 ACNE VULGARIS: Primary | ICD-10-CM

## 2022-08-19 DIAGNOSIS — Z30.011 OCP (ORAL CONTRACEPTIVE PILLS) INITIATION: ICD-10-CM

## 2022-08-19 PROCEDURE — 99214 OFFICE O/P EST MOD 30 MIN: CPT | Performed by: STUDENT IN AN ORGANIZED HEALTH CARE EDUCATION/TRAINING PROGRAM

## 2022-08-19 RX ORDER — DOXYCYCLINE 50 MG/1
50 TABLET ORAL 2 TIMES DAILY
Qty: 60 TABLET | Refills: 1 | Status: SHIPPED | OUTPATIENT
Start: 2022-08-19 | End: 2022-10-18

## 2022-08-19 NOTE — PROGRESS NOTES
Chief Complaint   Patient presents with    Follow-up    Medication Refill     1. \"Have you been to the ER, urgent care clinic since your last visit? Hospitalized since your last visit? \" No    2. \"Have you seen or consulted any other health care providers outside of the [address] [place] [address] since your last visit? \" No     3. For patients aged 39-70: Has the patient had a colonoscopy / FIT/ Cologuard? No      If the patient is female:    4. For patients aged 41-77: Has the patient had a mammogram within the past 2 years? No      5. For patients aged 21-65: Has the patient had a pap smear?  No  3 most recent PHQ Screens 8/19/2022   Little interest or pleasure in doing things Several days   Feeling down, depressed, irritable, or hopeless Several days   Total Score PHQ 2 2   Trouble falling or staying asleep, or sleeping too much -   Feeling tired or having little energy -   Poor appetite, weight loss, or overeating -   Feeling bad about yourself - or that you are a failure or have let yourself or your family down -   Trouble concentrating on things such as school, work, reading, or watching TV -   Moving or speaking so slowly that other people could have noticed; or the opposite being so fidgety that others notice -   Thoughts of being better off dead, or hurting yourself in some way -   PHQ 9 Score -   How difficult have these problems made it for you to do your work, take care of your home and get along with others -

## 2022-08-19 NOTE — PROGRESS NOTES
Consent: Yamilet Tucker, who was seen by synchronous (real-time) audio-video technology, and/or her healthcare decision maker, is aware that this patient-initiated, Telehealth encounter on 8/19/2022 is a billable service, with coverage as determined by her insurance carrier. She is aware that she may receive a bill and has provided verbal consent to proceed: YES-Consent obtained within past 12 months        712  Subjective:   Yamilet Tucker is a 27 y.o. female who was seen for Follow-up and Medication Refill    Patient is here to discuss acne, and birth control. Patient has history of acne, and was previously on multiple treatments including topical triamcinolone, and oral doxycycline. Her acne started worsening again and she restarted the topical tretinoin but is not helping. Couple years ago she had tried multiple topical medications and she was eventually placed on doxycycline which helped her acne significantly. States her acne is actually worse now than before. She would like to restart oral contraception pills. She was on oral contraceptives in the past.  She does have abnormal periods due to PCOS, she wants oral contraceptives for birth control. Recently had a relapse with alcohol, and went to alcohol rehab. She was placed on propanolol there for tachycardia and has helped. She has not drank any alcohol since being discharged. She has history of cirrhosis, and following with Hepatologist.  When she was hospitalized she had severe symptoms. Current Outpatient Medications on File Prior to Visit   Medication Sig Dispense Refill    traZODone (DESYREL) 100 mg tablet Take 1 Tablet by mouth nightly. 90 Tablet 1    propranoloL (INDERAL) 10 mg tablet Take 10 mg by mouth two (2) times a day. furosemide (LASIX) 20 mg tablet Take 1 Tablet by mouth in the morning. 30 Tablet 1    venlafaxine-SR (EFFEXOR-XR) 75 mg capsule Take 1 Capsule by mouth daily.  Total of 225 mg daily in combination with 15o capsule. 90 Capsule 1    venlafaxine-SR (EFFEXOR-XR) 150 mg capsule Take 1 Capsule by mouth daily. 90 Capsule 1    gabapentin (NEURONTIN) 100 mg capsule Take 1 Capsule by mouth two (2) times a day. Max Daily Amount: 200 mg. 180 Capsule 2    naltrexone (DEPADE) 50 mg tablet Take 25 mg by mouth two (2) times a day. (Patient not taking: Reported on 8/19/2022)       No current facility-administered medications on file prior to visit. Allergies   Allergen Reactions    Sulfa (Sulfonamide Antibiotics) Anaphylaxis, Hives and Shortness of Breath     Patient Active Problem List    Diagnosis    Alcoholic liver disease (HCC)    Cirrhosis ([facility]    Ascites    Neuropathy    Thrombocytopenia (HCC)     Secondary to liver disease      Polycystic ovarian syndrome    Depression     Per records from [place] 10/03/2018: patient had been on Venlafaxine  for depression. ROS    Objective:   Vital Signs: (As obtained by patient/caregiver at home)  There were no vitals taken for this visit.      [INSTRUCTIONS:  \"[x]\" Indicates a positive item  \"[]\" Indicates a negative item  -- DELETE ALL ITEMS NOT EXAMINED]    Constitutional: [x] Appears well-developed and well-nourished [x] No apparent distress      [] Abnormal -     Mental status: [x] Alert and awake  [x] Oriented to person/place/time [x] Able to follow commands    [] Abnormal -     Eyes:   EOM    [x]  Normal    [] Abnormal -   Sclera  [x]  Normal    [] Abnormal -          Discharge [x]  None visible   [] Abnormal -     HENT: [x] Normocephalic, atraumatic  [] Abnormal -   [x] Mouth/Throat: Mucous membranes are moist    External Ears [x] Normal  [] Abnormal -    Neck: [x] No visualized mass [] Abnormal -     Pulmonary/Chest: [x] Respiratory effort normal   [x] No visualized signs of difficulty breathing or respiratory distress        [] Abnormal -        Neurological:        [x] No Facial Asymmetry (Cranial nerve 7 motor function) (limited exam due to video visit)          [x] No gaze palsy        [] Abnormal -          Skin:        [x] No significant exanthematous lesions or discoloration noted on facial skin         [] Abnormal -            Psychiatric:       [x] Normal Affect [] Abnormal -        [x] No Hallucinations    Other pertinent observable physical exam findings:-              Assessment & Plan:   Diagnoses and all orders for this visit:    1. Acne vulgaris  -     doxycycline (ADOXA) 50 mg tablet; Take 1 Tablet by mouth two (2) times a day for 60 days. 2. OCP (oral contraceptive pills) initiation            We will restart doxycycline for acne. She is to follow-up in 1 month and can add topical medication at time if appropriate. Due to cirrhosis, and her severe episode hospitalized, I will hold off on starting OCPs and advised her to make an appointment with her gynecologist for an IUD. Follow-up and Dispositions    Return in about 2 months (around 10/19/2022) for Acne. We discussed the expected course, resolution and complications of the diagnosis(es) in detail. Medication risks, benefits, costs, interactions, and alternatives were discussed as indicated. I advised her to contact the office if her condition worsens, changes or fails to improve as anticipated. She expressed understanding with the diagnosis(es) and plan. Amanda Laurent is a 27 y.o. female being evaluated by a video visit encounter for concerns as above. A caregiver was present when appropriate. Due to this being a TeleHealth encounter (During [place] [address] public health emergency), evaluation of the following organ systems was limited: Vitals/Constitutional/EENT/Resp/CV/GI//MS/Neuro/Skin/Heme-Lymph-Imm.   Pursuant to the emergency declaration under the [facility] [address] waiver authority and the [employer] and [employer] Act, this Virtual  Visit was conducted, with patient's (and/or legal guardian's) consent, to reduce the patient's risk of exposure to COVID-19 and provide necessary medical care. Services were provided through a video synchronous discussion virtually to substitute for in-person clinic visit. Patient and provider were located at their individual homes.         Jose Shepherd MD

## 2022-09-28 ENCOUNTER — OFFICE VISIT (OUTPATIENT)
Dept: HEMATOLOGY | Age: 27
End: 2022-09-28
Payer: OTHER GOVERNMENT

## 2022-09-28 VITALS
HEART RATE: 94 BPM | DIASTOLIC BLOOD PRESSURE: 63 MMHG | TEMPERATURE: 97.3 F | HEIGHT: 66 IN | OXYGEN SATURATION: 99 % | WEIGHT: 153.6 LBS | BODY MASS INDEX: 24.68 KG/M2 | SYSTOLIC BLOOD PRESSURE: 120 MMHG

## 2022-09-28 DIAGNOSIS — K70.30 ALCOHOLIC CIRRHOSIS OF LIVER WITHOUT ASCITES (HCC): Primary | ICD-10-CM

## 2022-09-28 PROCEDURE — 99214 OFFICE O/P EST MOD 30 MIN: CPT | Performed by: PHYSICIAN ASSISTANT

## 2022-09-28 RX ORDER — SPIRONOLACTONE 50 MG/1
100 TABLET, FILM COATED ORAL DAILY
Qty: 30 TABLET | Refills: 3 | Status: SHIPPED | OUTPATIENT
Start: 2022-09-28

## 2022-09-28 NOTE — PROGRESS NOTES
[facility], MD, [address], [place], [place]      MADHURI Adrian, [facility]     Lima Xiao, [facility]   EBONI Rondon-ORLIN Bonilla, [facility]       Maicol Bain [address]    at [address], [address] [facility], [facility].    [phone]    FAX: [address]    at [address], [address], [address]    [phone]    FAX: [phone]       Patient Care Team:  Camilla Munroe MD as PCP - General (Family Medicine)  Camilla Munroe MD as PCP - [facility] Teodora Santamaria Provider      Problem List  Date Reviewed: 7/28/2022            Codes Class Noted    Alcoholic liver disease [facility]) ICD-10-CM: K70.9  ICD-9-CM: 571.3  5/13/2022        Cirrhosis ([facility].) ICD-10-CM: K74.60  ICD-9-CM: 571.5  5/13/2022        Ascites ICD-10-CM: R18.8  ICD-9-CM: 789.59  5/13/2022        Neuropathy ICD-10-CM: G62.9  ICD-9-CM: 355.9  12/11/2021        Thrombocytopenia ([facility].) ICD-10-CM: D69.6  ICD-9-CM: 287.5  12/11/2021    Overview Signed 12/11/2021  9:01 PM by Camilla Munroe MD     Secondary to liver disease             Polycystic ovarian syndrome ICD-10-CM: E28.2  ICD-9-CM: 256.4  Unknown        Depression ICD-10-CM: F32. A  ICD-9-CM: 231  Unknown    Overview Signed 5/29/2019  7:02 PM by Percy Cody     Per records from [facility] 10/03/2018: patient had been on Venlafaxine  for depression. Tylor Austin is being seen at [place] [address] for management of cirrhosis secondary to alcoholic liver disease. The active problem list, all pertinent past medical history, medications, radiologic findings and laboratory findings related to the liver disorder were reviewed and discussed with the patient.       The patient is a 24 y.o.  female who has consumed alcohol in excess over several years. She was hospitalized with alcoholic hepatitis and ascites in 1/2022. She has been abstinent from alcohol since late 6/2022. She was in rehab center at [facility] for ~45 days this Summer and has been doing reasonably well since completing this rehab. She has 91 days sobriety at this time and is continuing with AA. She has remained on naltrexone. The patient was found to have cirrhosis in 1/2022 when she developed ascites and was noted to have thrombocytopenia. An assessment of liver fibrosis with Fibroscan was performed in 7/2022 and showed EkPa was 44.8. Suggested fibrosis level is F4. CAP score is 138. Serologic evaluation for markers of chronic liver disease was negative for HCV, HBV, inherited and autoimmune disease. The patient has developed the following complications of cirrhosis: ascites and LE edema. The patient has the following symptoms which could be attributed to the liver disorder:    fatigue, intermittent pain in the right and left upper quadrants. The patient is not experiencing the following symptoms which are commonly seen in this liver disorder:   problems concentrating, swelling of the abdomen has resolved and she is no longer taking diuretics swelling of the lower extremities, hematemesis, or hematochezia. The patient has mild limitations in functional activities which can be attributed to the liver disease     She has expressed concern today for ongoing vague abdominal pain, LE edema despite furosemide 20 mg and issues with some acne development in the pat 4-5 weeks. ASSESSMENT AND PLAN:  Cirrhosis  The diagnosis of cirrhosis is based upon laboratory studies, complications of cirrhosis. Fibroscan shows a score of 45. 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 The CTP is 6. Child class A. The MELD score is 9 (7/2022). This will be rechecked today.    I have reviewed her past labs in detail and have shown her that with time, she has had improvement in overall liver synthetic function. We will repeat values in the office today. I have reviewed with her the need for ongoing evaluation for complications of cirrhosis. She has US and EGD scheduled within the next month. Alcohol liver disease  The diagnosis is based upon a history of consuming significant alcohol on a daily basis for many years, imaging, pattern of AST>ALT, serology that is negative for other causes of chronic liver disease. The histologic severity has not been defined. Have performed laboratory testing to monitor liver function and degree of liver injury. This included BMP, hepatic panel, CBC with platelet count, INR. AST is elevated. ALT is normal.  ALP is normal.  Liver function is normal.  Total bilirubin is elevated. The platelet count is depressed. The patient has been abstinent from alcohol since 6/2022  Discussed the need to remain abstinent from alcohol. She is presently committed to her recovery and is continuing with intensive outpatient work as well. She understands that she has little functional margin and the absolute necessity of maintaining sobriety. Ascites   Ascites developed for the first time in 1/2022. Ascites has resolved with step 1 diuretics. She has had no ongoing ascites. LE edema  She has had some increased LE edema and I have added spironolactone 50 mg to her furosemide 20 mg to help with the LE edema and to aid in the treatment of acne. The patient was counseled regarding the need to maintain sodium restriction and the types of foods containing high amounts of sodium to be avoided. She has had a hard time controlling intake as she is inpatient recovery and eating prepared meals. Screening for Esophageal varices   The patient has not had an EGD to screen for varices.   She is scheduled for EGD to assess for varices, this is planned for late 10/2022. Hepatic encephalopathy   Overt HE has not developed to date. She has had some mild memory and recall issues and appears to have slight asterixis on exam today. I have ordered a baseline ammonia level and have suggested that she start taking Miralax daily to induce daily bowel output as she is not having regular output at present. There is no reason for treatment with lactulose of xifaxan    Thrombocytopenia   This is secondary to cirrhosis. There is no evidence of overt bleeding. No treatment is required. The platelet count is adequate for the patient to undergo procedures without the need for platelet transfusion or platelet growth factors. Anemia   This is due to multifactorial causes including portal hypertension with chronic GI blood loss, bone marrow suppression secondary to malnutrition and alcohol. This has been shown to be resolved on last labs, will continue to monitor. Screening for Hepatocellular Carcinoma  [facility] [address] screening was performed in 1/2022 and does not suggest [address] Will repeat the imaging study every 6 months and will obtain repeat in 10/2022. I will follow-up with her on these results as available to me. Treatment of other medical problems in patients with chronic liver disease  There are no contraindications for the patient to take most medications that are necessary for treatment of other medical issues. The patient has cirrhosis and should avoid taking NSAIDs which are associated with a higher rate of developing JAZZ. The patient consumes alcohol on a daily basis or has recently stopped consuming alcohol. Regular alcohol use increases the risk of toxicity from acetaminophen. This analgesic should be avoided until the patient has been abstinent from alcohol for 6 months. Osteoporosis  The risk of osteoporosis is increased in patients with cirrhosis. DEXA bone density to assess for osteoporosis has not been performed.     This should be ordered by the patients primary care physician. Vaccinations   Vaccination for viral hepatitis B is recommended since the patient has no serologic evidence of previous exposure or vaccination with immunity. Vaccination for viral hepatitis A is not needed. The patient has serologic evidence of prior exposure or vaccination with immunity. The patient has received 2 doses of COVID-19 vaccine. Routine vaccinations against other bacterial and viral agents can be performed as indicated. Annual flu vaccination should be administered if indicated. ALLERGIES  Allergies   Allergen Reactions    Sulfa (Sulfonamide Antibiotics) Anaphylaxis, Hives and Shortness of Breath       MEDICATIONS  Current Outpatient Medications   Medication Sig    doxycycline (ADOXA) 50 mg tablet Take 1 Tablet by mouth two (2) times a day for 60 days. traZODone (DESYREL) 100 mg tablet Take 1 Tablet by mouth nightly. furosemide (LASIX) 20 mg tablet Take 1 Tablet by mouth in the morning. venlafaxine-SR (EFFEXOR-XR) 75 mg capsule Take 1 Capsule by mouth daily. Total of 225 mg daily in combination with 15o capsule. venlafaxine-SR (EFFEXOR-XR) 150 mg capsule Take 1 Capsule by mouth daily. gabapentin (NEURONTIN) 100 mg capsule Take 1 Capsule by mouth two (2) times a day. Max Daily Amount: 200 mg.    naltrexone (DEPADE) 50 mg tablet Take 25 mg by mouth two (2) times a day. (Patient not taking: No sig reported)    propranoloL (INDERAL) 10 mg tablet Take 10 mg by mouth two (2) times a day. (Patient not taking: Reported on 9/28/2022)     No current facility-administered medications for this visit. SYSTEM REVIEW NOT RELATED TO LIVER DISEASE OR REVIEWED ABOVE:  Constitution systems: Negative for fever, chills, weight gain, weight loss. Eyes: Negative for visual changes. ENT: Negative for sore throat, painful swallowing. Respiratory: Negative for cough, hemoptysis, SOB.    Cardiology: Negative for chest pain, palpitations. GI:  Negative for constipation or diarrhea. : Negative for urinary frequency, dysuria, hematuria, nocturia. Skin: Negative for rash. Hematology: Negative for easy bruising, blood clots. Musculo-skeletal: Negative for back pain, muscle pain, weakness. Neurologic: Negative for headaches, dizziness, vertigo, memory problems not related to HE. Psychology: Negative for anxiety, depression. FAMILY HISTORY:  The father Has/had the following following chronic disease(s): alcohol abuse. The mother Has/had the following chronic disease(s): None. SOCIAL HISTORY:  The patient is . The patient has no children. The patient has never used tobacco products. The patient uses vapes. The patient has previously consumed alcohol in excess. The patient has been abstinent from alcohol since 6/2022. The patient currently works full time as [employer]. Presently out of work for recovery. PHYSICAL EXAMINATION:  Visit Vitals  /63 (BP 1 Location: Right arm, BP Patient Position: Sitting, BP Cuff Size: Adult)   Pulse 94   Temp 97.3 °F (36.3 °C) (Temporal)   Ht 5' 6\" (1.676 m)   Wt 153 lb 9.6 oz (69.7 kg)   SpO2 99%   BMI 24.79 kg/m²     General: No acute distress. Eyes: Sclera anicteric. ENT: No oral lesions. Thyroid normal.  Nodes: No adenopathy. Skin: No spider angiomata. No jaundice. No palmar erythema. Respiratory: Lungs clear to auscultation. Cardiovascular: Regular heart rate. No murmurs. No JVD. Abdomen: Soft non-tender. Liver size normal to percussion/palpation. Spleen not palpable. No obvious ascites. Extremities: No edema. No muscle wasting. No gross arthritic changes. Neurologic: Alert and oriented. Cranial nerves grossly intact. No asterixis.     LABORATORY STUDIES:  Liver [place] of [address] Units 7/28/2022 4/21/2022 2/4/2022   WBC 3.6 - 11.0 K/uL 5.7 4.8 3.7   ANC 1.4 - 7.0 x10E3/uL  3.2 2.1   HGB 11.5 - 16.0 g/dL 12.3 10.6 (L) 10.8 (L)    - 400 K/uL 123 (L) 109 (L) 98 (LL)   INR 0.9 - 1.1   1.2 (H) 1.3 (H)    AST 15 - 37 U/L 44 (H) 44 (H) 122 (H)   ALT 12 - 78 U/L 33 16 28   Alk Phos 45 - 117 U/L 84 82 135 (H)   Bili, Total 0.2 - 1.0 MG/DL 1.3 (H) 2.2 (H) 3.4 (H)   Bili, Direct 0.0 - 0.2 MG/DL 0.5 (H) 0.69 (H)    Albumin 3.5 - 5.0 g/dL 4.1 4.2 3.0 (L)   BUN 6 - 20 MG/DL 6 7 7   Creat 0.55 - 1.02 MG/DL 0.72 0.78 0.66   Na 136 - 145 mmol/L 142 140 141   K 3.5 - 5.1 mmol/L 3.9 4.5 3.8   Cl 97 - 108 mmol/L 105 102 105   CO2 21 - 32 mmol/L 28 23 25   Glucose 65 - 100 mg/dL 77 72 79   Magnesium 1.6 - 2.3 mg/dL   2.0   Ammonia <32 umol/L        Cancer Screening Latest Ref Rng & Units 7/28/2022   AFP, Serum 0.0 - 4.7 ng/mL 2.9   AFP-L3% 0.0 - 9.9 % Comment   Additional lab values drawn at today's office visit are pending at the time of documentation. SEROLOGIES:  Serologies Latest Ref Rng & Units 4/21/2022 1/23/2022   Hep A Ab, Total Negative Positive (A)    Hep B Surface Ag Index  <0.10   Hep B Surface Ag Interp Negative  Negative   Hep B Core Ab, Total Negative Negative    Hep B Surface AB QL  Non Reactive    Hep C Ab NONREACTIVE  NONREACTIVE   Ferritin 15 - 150 ng/mL 77    Iron % Saturation 15 - 55 % 54    MANI, IFA  Negative    ASMCA 0 - 19 Units 12    M2 Ab 0.0 - 20.0 Units <20.0    Ceruloplasmin 19.0 - 39.0 mg/dL 22.8    Alpha-1 antitrypsin level 100 - 188 mg/dL 217 (H)      LIVER HISTOLOGY:  7/2022. FibroScan performed at [address]. EkPa was 44.8. Suggested fibrosis level is F4. CAP score is 138. ENDOSCOPIC PROCEDURES:  Not available or performed, pending 10/2022    RADIOLOGY:  1/2022. Ultrasound of liver. Echogenic consistent with fatty liver. No liver mass lesions. No dilated bile ducts. No ascites. OTHER TESTING:  Not available or performed    FOLLOW-UP:  All of the issues listed above in the Assessment and Plan were discussed with the patient. All questions were answered.   The patient expressed a clear understanding of the above. [facility] 87 in 4 months for office visit to monitor. Patient to have US and EGD in the meantime. Tess Linton PA-C  [facility] 59, [facility] 22.  [phone]  [address]    10/12/2022. Rec'd communication from patient that she had severe pain over past weekend in upper abdomen, went to the EMD and had lap cholecystectomy on 10/10/2022.

## 2022-09-28 NOTE — PROGRESS NOTES
Identified pt with two pt identifiers(name and ). Reviewed record in preparation for visit and have obtained necessary documentation. Chief Complaint   Patient presents with    Cirrhosis Of Liver     2month follow up with Ajit Tripp:    22 1549   BP: 120/63   Pulse: 94   Temp: 97.3 °F (36.3 °C)   TempSrc: Temporal   SpO2: 99%   Weight: 153 lb 9.6 oz (69.7 kg)   Height: 5' 6\" (1.676 m)   PainSc:   2   PainLoc: Back       Health Maintenance Review: Patient reminded of \"due or due soon\" health maintenance. I have asked the patient to contact his/her primary care provider (PCP) for follow-up on his/her health maintenance. Coordination of Care Questionnaire:  :   1) Have you been to an emergency room, urgent care, or hospitalized since your last visit? If yes, where when, and reason for visit? no       2. Have seen or consulted any other health care provider since your last visit? If yes, where when, and reason for visit? YES      Patient is accompanied by self I have received verbal consent from Floyd Shah to discuss any/all medical information while they are present in the room.

## 2022-09-29 LAB
ALBUMIN SERPL-MCNC: 4.8 G/DL (ref 3.9–5)
ALP SERPL-CCNC: 75 IU/L (ref 44–121)
ALT SERPL-CCNC: 18 IU/L (ref 0–32)
AMMONIA PLAS-MCNC: 64 UG/DL (ref 29–112)
AST SERPL-CCNC: 40 IU/L (ref 0–40)
BILIRUB DIRECT SERPL-MCNC: 0.42 MG/DL (ref 0–0.4)
BILIRUB SERPL-MCNC: 1.5 MG/DL (ref 0–1.2)
BUN SERPL-MCNC: 9 MG/DL (ref 6–20)
BUN/CREAT SERPL: 12 (ref 9–23)
CALCIUM SERPL-MCNC: 9.2 MG/DL (ref 8.7–10.2)
CHLORIDE SERPL-SCNC: 97 MMOL/L (ref 96–106)
CO2 SERPL-SCNC: 23 MMOL/L (ref 20–29)
CREAT SERPL-MCNC: 0.76 MG/DL (ref 0.57–1)
EGFR: 111 ML/MIN/1.73
ERYTHROCYTE [DISTWIDTH] IN BLOOD BY AUTOMATED COUNT: 15 % (ref 11.7–15.4)
GLUCOSE SERPL-MCNC: 73 MG/DL (ref 70–99)
HCT VFR BLD AUTO: 37.7 % (ref 34–46.6)
HGB BLD-MCNC: 12.9 G/DL (ref 11.1–15.9)
INR PPP: 1.2 (ref 0.9–1.2)
MCH RBC QN AUTO: 28.7 PG (ref 26.6–33)
MCHC RBC AUTO-ENTMCNC: 34.2 G/DL (ref 31.5–35.7)
MCV RBC AUTO: 84 FL (ref 79–97)
PLATELET # BLD AUTO: 106 X10E3/UL (ref 150–450)
POTASSIUM SERPL-SCNC: 3.7 MMOL/L (ref 3.5–5.2)
PROT SERPL-MCNC: 7.6 G/DL (ref 6–8.5)
PROTHROMBIN TIME: 12.9 SEC (ref 9.1–12)
RBC # BLD AUTO: 4.5 X10E6/UL (ref 3.77–5.28)
SODIUM SERPL-SCNC: 137 MMOL/L (ref 134–144)
WBC # BLD AUTO: 5.4 X10E3/UL (ref 3.4–10.8)

## 2022-09-30 LAB — ETHANOL BLD GC-MCNC: <.01 %

## 2022-10-04 ENCOUNTER — HOSPITAL ENCOUNTER (OUTPATIENT)
Dept: ULTRASOUND IMAGING | Age: 27
Discharge: HOME | End: 2022-10-04
Payer: OTHER GOVERNMENT

## 2022-10-04 DIAGNOSIS — K70.30 ALCOHOLIC CIRRHOSIS OF LIVER WITHOUT ASCITES (HCC): ICD-10-CM

## 2022-10-04 PROCEDURE — 76700 US EXAM ABDOM COMPLETE: CPT

## 2022-10-04 NOTE — PROGRESS NOTES
Pt notified of stone in the gallbladder neck, she has had vague abdominal pain symptoms. Advised her to contact me if this increases in frequency or intensity and we can make arrangements for surgical evaluation as needed. Otherwise no liver mass/lesion. Follow-up as scheduled.

## 2022-10-19 DIAGNOSIS — F32.A DEPRESSION, UNSPECIFIED DEPRESSION TYPE: ICD-10-CM

## 2022-10-19 DIAGNOSIS — G62.9 NEUROPATHY: ICD-10-CM

## 2022-10-19 PROBLEM — K80.20 CALCULUS OF GALLBLADDER WITHOUT CHOLECYSTITIS WITHOUT OBSTRUCTION: Status: ACTIVE | Noted: 2022-10-19

## 2022-10-19 PROBLEM — K80.20 CALCULUS OF GALLBLADDER WITHOUT CHOLECYSTITIS: Status: ACTIVE | Noted: 2022-10-19

## 2022-10-19 PROBLEM — R16.1 SPLENOMEGALY: Status: ACTIVE | Noted: 2022-10-19

## 2022-10-19 RX ORDER — GABAPENTIN 100 MG/1
CAPSULE ORAL
Qty: 180 CAPSULE | Refills: 1 | Status: SHIPPED | OUTPATIENT
Start: 2022-10-19

## 2022-10-19 RX ORDER — VENLAFAXINE HYDROCHLORIDE 150 MG/1
150 CAPSULE, EXTENDED RELEASE ORAL DAILY
Qty: 90 CAPSULE | Refills: 3 | Status: SHIPPED | OUTPATIENT
Start: 2022-10-19

## 2022-10-19 NOTE — PROGRESS NOTES
Reviewed CT from [facility]. She had gallstones with no cholecystitis, and chronic splenomegaly and hepatic steatosis.

## 2022-10-20 ENCOUNTER — HOSPITAL ENCOUNTER (OUTPATIENT)
Age: 27
Setting detail: OUTPATIENT SURGERY
Discharge: HOME | End: 2022-10-20
Attending: INTERNAL MEDICINE | Admitting: INTERNAL MEDICINE
Payer: OTHER GOVERNMENT

## 2022-10-20 ENCOUNTER — ANESTHESIA (OUTPATIENT)
Dept: ENDOSCOPY | Age: 27
End: 2022-10-20
Payer: OTHER GOVERNMENT

## 2022-10-20 ENCOUNTER — ANESTHESIA EVENT (OUTPATIENT)
Dept: ENDOSCOPY | Age: 27
End: 2022-10-20
Payer: OTHER GOVERNMENT

## 2022-10-20 VITALS
WEIGHT: 141 LBS | SYSTOLIC BLOOD PRESSURE: 116 MMHG | TEMPERATURE: 98.9 F | HEART RATE: 97 BPM | BODY MASS INDEX: 22.66 KG/M2 | HEIGHT: 66 IN | DIASTOLIC BLOOD PRESSURE: 82 MMHG | OXYGEN SATURATION: 100 % | RESPIRATION RATE: 20 BRPM

## 2022-10-20 DIAGNOSIS — K29.70 GASTRITIS WITHOUT BLEEDING, UNSPECIFIED CHRONICITY, UNSPECIFIED GASTRITIS TYPE: Primary | ICD-10-CM

## 2022-10-20 DIAGNOSIS — K76.6 PORTAL HYPERTENSIVE GASTROPATHY (HCC): ICD-10-CM

## 2022-10-20 DIAGNOSIS — K31.89 PORTAL HYPERTENSIVE GASTROPATHY (HCC): ICD-10-CM

## 2022-10-20 DIAGNOSIS — K26.9 DUODENAL EROSION: ICD-10-CM

## 2022-10-20 LAB
GLUCOSE BLD STRIP.AUTO-MCNC: 82 MG/DL (ref 65–117)
HCG UR QL: NEGATIVE
SERVICE CMNT-IMP: NORMAL

## 2022-10-20 PROCEDURE — 88305 TISSUE EXAM BY PATHOLOGIST: CPT

## 2022-10-20 PROCEDURE — 76040000019: Performed by: INTERNAL MEDICINE

## 2022-10-20 PROCEDURE — 76060000031 HC ANESTHESIA FIRST 0.5 HR: Performed by: INTERNAL MEDICINE

## 2022-10-20 PROCEDURE — 77030010936 HC CLP LIG BSC -C: Performed by: INTERNAL MEDICINE

## 2022-10-20 PROCEDURE — 74011250636 HC RX REV CODE- 250/636: Performed by: NURSE ANESTHETIST, CERTIFIED REGISTERED

## 2022-10-20 PROCEDURE — 82962 GLUCOSE BLOOD TEST: CPT

## 2022-10-20 PROCEDURE — 43239 EGD BIOPSY SINGLE/MULTIPLE: CPT | Performed by: INTERNAL MEDICINE

## 2022-10-20 PROCEDURE — 81025 URINE PREGNANCY TEST: CPT

## 2022-10-20 PROCEDURE — 2709999900 HC NON-CHARGEABLE SUPPLY: Performed by: INTERNAL MEDICINE

## 2022-10-20 PROCEDURE — 77030021593 HC FCPS BIOP ENDOSC BSC -A: Performed by: INTERNAL MEDICINE

## 2022-10-20 DEVICE — WORKING LENGTH 235CM, WORKING CHANNEL 2.8MM
Type: IMPLANTABLE DEVICE | Site: DUODENUM | Status: FUNCTIONAL
Brand: RESOLUTION 360 CLIP

## 2022-10-20 RX ORDER — DOXYCYCLINE 50 MG/1
50 TABLET ORAL 2 TIMES DAILY
COMMUNITY
End: 2022-11-12

## 2022-10-20 RX ORDER — FLUMAZENIL 0.1 MG/ML
0.2 INJECTION INTRAVENOUS
Status: DISCONTINUED | OUTPATIENT
Start: 2022-10-20 | End: 2022-10-20 | Stop reason: HOSPADM

## 2022-10-20 RX ORDER — FENTANYL CITRATE 50 UG/ML
50-200 INJECTION, SOLUTION INTRAMUSCULAR; INTRAVENOUS
Status: DISCONTINUED | OUTPATIENT
Start: 2022-10-20 | End: 2022-10-20 | Stop reason: HOSPADM

## 2022-10-20 RX ORDER — SODIUM CHLORIDE 9 MG/ML
50 INJECTION, SOLUTION INTRAVENOUS CONTINUOUS
Status: DISCONTINUED | OUTPATIENT
Start: 2022-10-20 | End: 2022-10-20 | Stop reason: HOSPADM

## 2022-10-20 RX ORDER — PROPOFOL 10 MG/ML
INJECTION, EMULSION INTRAVENOUS AS NEEDED
Status: DISCONTINUED | OUTPATIENT
Start: 2022-10-20 | End: 2022-10-20 | Stop reason: HOSPADM

## 2022-10-20 RX ORDER — SODIUM CHLORIDE 0.9 % (FLUSH) 0.9 %
5-40 SYRINGE (ML) INJECTION AS NEEDED
Status: DISCONTINUED | OUTPATIENT
Start: 2022-10-20 | End: 2022-10-20 | Stop reason: HOSPADM

## 2022-10-20 RX ORDER — DEXTROMETHORPHAN/PSEUDOEPHED 2.5-7.5/.8
1.2 DROPS ORAL
Status: DISCONTINUED | OUTPATIENT
Start: 2022-10-20 | End: 2022-10-20 | Stop reason: HOSPADM

## 2022-10-20 RX ORDER — PANTOPRAZOLE SODIUM 20 MG/1
20 TABLET, DELAYED RELEASE ORAL DAILY
Qty: 30 TABLET | Refills: 3 | Status: SHIPPED | OUTPATIENT
Start: 2022-10-20

## 2022-10-20 RX ORDER — SODIUM CHLORIDE 9 MG/ML
INJECTION, SOLUTION INTRAVENOUS
Status: DISCONTINUED | OUTPATIENT
Start: 2022-10-20 | End: 2022-10-20 | Stop reason: HOSPADM

## 2022-10-20 RX ORDER — SODIUM CHLORIDE 0.9 % (FLUSH) 0.9 %
5-40 SYRINGE (ML) INJECTION EVERY 8 HOURS
Status: DISCONTINUED | OUTPATIENT
Start: 2022-10-20 | End: 2022-10-20 | Stop reason: HOSPADM

## 2022-10-20 RX ORDER — ATROPINE SULFATE 0.1 MG/ML
0.5 INJECTION INTRAVENOUS
Status: DISCONTINUED | OUTPATIENT
Start: 2022-10-20 | End: 2022-10-20 | Stop reason: HOSPADM

## 2022-10-20 RX ORDER — EPINEPHRINE 0.1 MG/ML
1 INJECTION INTRACARDIAC; INTRAVENOUS
Status: DISCONTINUED | OUTPATIENT
Start: 2022-10-20 | End: 2022-10-20 | Stop reason: HOSPADM

## 2022-10-20 RX ORDER — MIDAZOLAM HYDROCHLORIDE 1 MG/ML
5-10 INJECTION, SOLUTION INTRAMUSCULAR; INTRAVENOUS
Status: DISCONTINUED | OUTPATIENT
Start: 2022-10-20 | End: 2022-10-20 | Stop reason: HOSPADM

## 2022-10-20 RX ORDER — NALOXONE HYDROCHLORIDE 0.4 MG/ML
0.4 INJECTION, SOLUTION INTRAMUSCULAR; INTRAVENOUS; SUBCUTANEOUS
Status: DISCONTINUED | OUTPATIENT
Start: 2022-10-20 | End: 2022-10-20 | Stop reason: HOSPADM

## 2022-10-20 RX ADMIN — PROPOFOL 50 MG: 10 INJECTION, EMULSION INTRAVENOUS at 15:53

## 2022-10-20 RX ADMIN — SODIUM CHLORIDE: 900 INJECTION, SOLUTION INTRAVENOUS at 15:21

## 2022-10-20 RX ADMIN — PROPOFOL 50 MG: 10 INJECTION, EMULSION INTRAVENOUS at 15:49

## 2022-10-20 RX ADMIN — PROPOFOL 100 MG: 10 INJECTION, EMULSION INTRAVENOUS at 15:41

## 2022-10-20 RX ADMIN — PROPOFOL 50 MG: 10 INJECTION, EMULSION INTRAVENOUS at 15:45

## 2022-10-20 RX ADMIN — PROPOFOL 50 MG: 10 INJECTION, EMULSION INTRAVENOUS at 15:51

## 2022-10-20 RX ADMIN — PROPOFOL 100 MG: 10 INJECTION, EMULSION INTRAVENOUS at 15:42

## 2022-10-20 RX ADMIN — PROPOFOL 50 MG: 10 INJECTION, EMULSION INTRAVENOUS at 15:47

## 2022-10-20 RX ADMIN — PROPOFOL 100 MG: 10 INJECTION, EMULSION INTRAVENOUS at 15:43

## 2022-10-20 NOTE — PROCEDURES
[facility] [place], MD, FACP, Ritu Ivan Gardner, Costa Dickinson Marcelino, MADHURI Xiao, [facility]-BC   Kennedy Nieves, [facility]   Lenka Galvan, DAVIAN Pan FNP-ORLIN Muller, [facility] CorporationNP-BC       [facility]    at [address], [facility] Jennifer Harley  22.    [phone]    FAX: [address] [place] [address] Drive, [address] [place] [address], [address] [place] [address] - [address]    [phone]    FAX: [phone]       UPPER ENDOSCOPY PROCEDURE NOTE    Sonu Chau  1995    INDICATION: Cirrhosis. Screening for esophageal varices with variceal ligation if  indicated. : Heydi Block MD    SURGICAL ASSISTANT:  None    PROSTHETIC DEVISES, TISSUE GRAFTS, ORGAN TRANSPLANTS:  Not applicable     ANESTHESIA/SEDATION: MAC Sedation per anesthesiology      PROCEDURE DESCRIPTION:  [employer] consent was obtained from the patient for the procedure. All risks and benefits of the procedure explained. The procedure was performed in the endoscopy suite. The patient was laying on a stretcher and moved to the left lateral decubitus position prior to administration of sedation. Sedation was administered by anesthesiology. See their note for details. The endoscope was inserted into the mouth and advanced under direct vision to the second portion of the duodenum. Careful inspection of upper gastrointestinal tract was made as the endoscope was inserted and withdrawn. Retroflexion of the endoscope to view of the cardia of the stomach was performed. The findings and interventions are described below.       FINDINGS:  Esophagus:    Normal.      Stomach:   Mild portal hypertensive gastropathy of the body of the stomach  Gastritis of the antrum  No gastric varices identified. Duodenum:   Duodenal erosions. SPECIMENS COLLECTED:   None  2 biopsies were taken from the duodenum. 2 biopsies were taken from the antrum. The specimens were placed in preservative and transported to Pathology after the procedure. INTERVENTIONS:  None    COMPLICATIONS: None. The patient tolerated the procedure well. EBL: Negligible. RECOMMENDATIONS:  Observe until discharge parameters are achieved. May resume previous diet. Repeat endoscopy to reassess varices and need for banding in 2 years. Follow-up Liver [facility] office as scheduled.       Layla Ayala MD  [address]  Sonali Sewell 7  [facility]  22.  [phone]  FAX:  [phone]

## 2022-10-20 NOTE — H&P
[facility], MD, FACP, [place], [place]      MADHURI [place], [facility]   Camelia Ortega, [facility]   Emy Mccartney, FNP-C   Connor Pillai, FNP-C    Rafita Good, [facility]-BC       Maicol Bain [facility]    at [address], [facility] Jennifer Harley  22.    [phone]    FAX: [address] [place] [address], [address]    [phone]    FAX: [phone]         PRE-PROCEDURE NOTE - EGD    H and P from last office visit reviewed. Allergies reviewed. Out-patient medicaton list reviewed. Patient Active Problem List   Diagnosis Code    Depression F32. A    Polycystic ovarian syndrome E28.2    Neuropathy G62.9    Thrombocytopenia (HCC) O36.8    Alcoholic liver disease (HCC) K70.9    Cirrhosis (HCC) K74.60    Ascites R18.8    Calculus of gallbladder without cholecystitis K80.20    Splenomegaly R16.1       Allergies   Allergen Reactions    Sulfa (Sulfonamide Antibiotics) Anaphylaxis, Hives and Shortness of Breath       No current facility-administered medications on file prior to encounter. Current Outpatient Medications on File Prior to Encounter   Medication Sig Dispense Refill    doxycycline (ADOXA) 50 mg tablet Take 50 mg by mouth two (2) times a day. traZODone (DESYREL) 100 mg tablet Take 1 Tablet by mouth nightly. 90 Tablet 1    furosemide (LASIX) 20 mg tablet Take 1 Tablet by mouth in the morning. 30 Tablet 1    venlafaxine-SR (EFFEXOR-XR) 75 mg capsule Take 1 Capsule by mouth daily. Total of 225 mg daily in combination with 15o capsule. 90 Capsule 1    naltrexone (DEPADE) 50 mg tablet Take 25 mg by mouth two (2) times a day.  (Patient not taking: No sig reported)      propranoloL (INDERAL) 10 mg tablet Take 10 mg by mouth two (2) times a day. (Patient not taking: Reported on 9/28/2022)         For EGD to assess for esophageal and gastric varices. Plan to perform banding if indicated based upon variceal size and appearance. The risks of the procedure were discussed with the patient. These included reaction to anesthesia, pain, perforation and bleeding. All questions were answered. The patient wishes to proceed with the procedure. The patient was counseled at length about the risks of carolina Covid-19 in the judith-operative and post-operative states including the recovery window of their procedure. The patient was made aware that carolina Covid-19 after a surgical procedure may worsen their prognosis for recovering from the virus and lend to a higher morbidity and or mortality risk. The patient was given the options of postponing their procedure. All of the risks, benefits, and alternatives were discussed. The patient does  wish to proceed with the procedure. PHYSICAL EXAMINATION:  Visit Vitals  /70   Pulse 98   Temp 98.5 °F (36.9 °C)   Resp 18   SpO2 98%   Breastfeeding No       General: No acute distress. Eyes: Sclera anicteric. ENT: No oral lesions. Thyroid normal.  Nodes: No adenopathy. Skin: No spider angiomata. No jaundice. No palmar erythema. Respiratory: Lungs clear to auscultation. Cardiovascular: Regular heart rate. No murmurs. No JVD. Abdomen: Soft non-tender, liver size normal to percussion/palpation. Spleen not palpable. No obvious ascites. Extremities: No edema. No muscle wasting. No gross arthritic changes. Neurologic: Alert and oriented. Cranial nerves grossly intact. No asterixis.       MOST RECENT LABORATORY STUDIES:  Lab Results   Component Value Date/Time    WBC 5.4 09/28/2022 04:52 PM    HGB 12.9 09/28/2022 04:52 PM    HCT 37.7 09/28/2022 04:52 PM    PLATELET 654 (L) 23/10/7744 04:52 PM    MCV 84 09/28/2022 04:52 PM     Lab Results   Component Value Date/Time    INR 1.2 09/28/2022 04:52 PM    INR 1.2 (H) 07/28/2022 02:36 PM    INR 1.3 (H) 04/21/2022 12:47 PM    Prothrombin time 12.9 (H) 09/28/2022 04:52 PM    Prothrombin time 12.8 (H) 07/28/2022 02:36 PM    Prothrombin time 13.5 (H) 04/21/2022 12:47 PM       ASSESSMENT AND PLAN:  EGD to assess for esophageal and/or gastric varices. Sedation per anesthesiology  Conscious sedation with fentanyl and versed.     Orestes Cage MD  [address]  Sonali Sewell 7  [place]ennifer olmos  22.  [phone]  FAX:  [phone] [place]

## 2022-10-20 NOTE — ANESTHESIA POSTPROCEDURE EVALUATION
Procedure(s):  ESOPHAGOGASTRODUODENOSCOPY (EGD)  ESOPHAGOGASTRODUODENAL (EGD) BIOPSY  RESOLUTION CLIP. MAC    Anesthesia Post Evaluation        Patient location during evaluation: PACU  Note status: Adequate. Level of consciousness: responsive to verbal stimuli and sleepy but conscious  Pain management: satisfactory to patient  Airway patency: patent  Anesthetic complications: no  Cardiovascular status: acceptable  Respiratory status: acceptable  Hydration status: acceptable  Comments: +Post-Anesthesia Evaluation and Assessment    Patient: Linda Sanabria MRN: [id number]  SSN: [id number]   YOB: 1995  Age: 24 y.o. Sex: female          Cardiovascular Function/Vital Signs    /82   Pulse 97   Temp 37.2 °C (98.9 °F)   Resp 20   Ht 5' 6\" (1.676 m)   Wt 64 kg (141 lb)   SpO2 100%   Breastfeeding No   BMI 22.76 kg/m²     Patient is status post Procedure(s):  ESOPHAGOGASTRODUODENOSCOPY (EGD)  ESOPHAGOGASTRODUODENAL (EGD) BIOPSY  RESOLUTION CLIP. Nausea/Vomiting: Controlled. Postoperative hydration reviewed and adequate. Pain:  Pain Scale 1: Visual (10/20/22 1620)  Pain Intensity 1: 0 (10/20/22 1620)   Managed. Neurological Status: At baseline. Mental Status and Level of Consciousness: Arousable. Pulmonary Status:   O2 Device: None (Room air) (10/20/22 1620)   Adequate oxygenation and airway patent. Complications related to anesthesia: None    Post-anesthesia assessment completed. No concerns. I have evaluated the patient and the patient is stable and ready to be discharged from PACU .     Signed By: Yusuf Trivedi MD    10/20/2022        INITIAL Post-op Vital signs:   Vitals Value Taken Time   /82 10/20/22 1620   Temp 37.2 °C (98.9 °F) 10/20/22 1601   Pulse 97 10/20/22 1620   Resp 20 10/20/22 1620   SpO2 100 % 10/20/22 1620

## 2022-10-20 NOTE — ANESTHESIA PREPROCEDURE EVALUATION
Relevant Problems   No relevant active problems       Anesthetic History   No history of anesthetic complications            Review of Systems / Medical History  Patient summary reviewed, nursing notes reviewed and pertinent labs reviewed    Pulmonary  Within defined limits                 Neuro/Psych   Within defined limits      Psychiatric history     Cardiovascular  Within defined limits                Exercise tolerance: >4 METS     GI/Hepatic/Renal  Within defined limits         Liver disease     Endo/Other  Within defined limits           Other Findings   Comments: PCOS           Physical Exam    Airway  Mallampati: II  TM Distance: > 6 cm  Neck ROM: normal range of motion   Mouth opening: Normal     Cardiovascular  Regular rate and rhythm,  S1 and S2 normal,  no murmur, click, rub, or gallop             Dental  No notable dental hx       Pulmonary  Breath sounds clear to auscultation               Abdominal  GI exam deferred       Other Findings            Anesthetic Plan    ASA: 3  Anesthesia type: MAC          Induction: Intravenous  Anesthetic plan and risks discussed with: Patient

## 2022-10-20 NOTE — DISCHARGE INSTRUCTIONS
[address] [facility], MD, FACP, [address], [place]      MADHURI Frost, [facility]-BC   Alexa Ashley [facility]   Delia Rodriguez, DAVIAN Parker FNGAURI-ORLIN Morrow, [facility]       Maicol Bain [address]    at [address], [facility] Jennifer Harley  22.    [phone]    FAX: [address] [place] [address] Avenue    at [facility]    [address], [address] [place] [address] Street [address]    [phone]    FAX: [phone]         ENDOSCOPY DISCHARGE INSTRUCTIONS      Ayala Harper  1995  Date: 10/20/2022    DISCOMFORT:  Use lozenges or warm salt water gargle for sore thoat  Apply warm compress to IV site if red. If redness or soreness persists call the office. You may experience gas and bloating. Walking and belching will help relieve this. You may experience chest discomfort or pressure especially if banding of esophageal varices was performed. DIET:  You may resume your normal diet. ACTIVITY:  Spend the remainder of the day resting. Avoid any strenuous activity. You may not operate a vehicle for 12 hours. You may not engage in an occupation involving machinery or appliances for rest of today. Avoid making any critical or financial decisions for at least 24 hour. Call the [address] [address] [facility] if you have any of the following:  Increasing chest or abdominal pain, nausea, vomiting, vomiting blood, abdominal distension or swelling, fever or chills, bloody discharge from nose or mouth or shortness of breath. Follow-up Instructions:  Call Dr. Mavis Kaye for any questions or problems at the phone number listed above. If a biopsy was performed, you will be contacted by the office staff or Dr Mavis Kaye within 1 week.    If you have not heard from us by then you may call the office at the phone number listed above to inquire about the results. ENDOSCOPY FINDINGS:  Your endoscopy demonstrated irritation in the stomach and duodenum that is probably from alcohol  A biopsy of the stomach and duodenum was taken. Will repeat EGD to look for development of varices in 2 years. Keep follow-up appointment with Alvarez Hawkins on 2/13/2023. DISCHARGE SUMMARY from the Nurse: The following personal items collected during your admission are returned to you:   Dental Appliance: Dental Appliances: None (tongue piercing, MD aware)  Vision: Visual Aid: Glasses, At bedside  Hearing Aid:    Jewelry:    Clothing:    Other Valuables:    Valuables sent to safe:                          Learning About Coronavirus (COVID-19)  Coronavirus (COVID-19): Overview  What is coronavirus ([facility]-68)? The coronavirus disease (COVID-19) is caused by a virus. It is an illness that was first found in [place], [place], in December 2019. It has since spread worldwide. The virus can cause fever, cough, and trouble breathing. In severe cases, it can cause pneumonia and make it hard to breathe without help. It can cause death. Coronaviruses are a large group of viruses. They cause the common cold. They also cause more serious illnesses like Middle East respiratory syndrome (MERS) and severe acute respiratory syndrome (SARS). COVID-19 is caused by a novel coronavirus. That means it's a new type that has not been seen in people before. This virus spreads person-to-person through droplets from coughing and sneezing. It can also spread when you are close to someone who is infected. And it can spread when you touch something that has the virus on it, such as a doorknob or a tabletop. What can you do to protect yourself from coronavirus (COVID-19)? The best way to protect yourself from getting sick is to: Avoid areas where there is an outbreak. Avoid contact with people who may be infected.   Wash your hands often with soap or alcohol-based hand sanitizers. Avoid crowds and try to stay at least 6 feet away from other people. Wash your hands often, especially after you cough or sneeze. Use soap and water, and scrub for at least 20 seconds. If soap and water aren't available, use an alcohol-based hand . Avoid touching your mouth, nose, and eyes. What can you do to avoid spreading the virus to others? To help avoid spreading the virus to others:  Cover your mouth with a tissue when you cough or sneeze. Then throw the tissue in the trash. Use a disinfectant to clean things that you touch often. Stay home if you are sick or have been exposed to the virus. Don't go to school, work, or public areas. And don't use public transportation. If you are sick:  Leave your home only if you need to get medical care. But call the doctor's office first so they know you're coming. And wear a face mask, if you have one. If you have a face mask, wear it whenever you're around other people. It can help stop the spread of the virus when you cough or sneeze. Clean and disinfect your home every day. Use household  and disinfectant wipes or sprays. Take special care to clean things that you grab with your hands. These include doorknobs, remote controls, phones, and handles on your refrigerator and microwave. And don't forget countertops, tabletops, bathrooms, and computer keyboards. When to call for help  Call 911 anytime you think you may need emergency care. For example, call if:  You have severe trouble breathing. (You can't talk at all.)  You have constant chest pain or pressure. You are severely dizzy or lightheaded. You are confused or can't think clearly. Your face and lips have a blue color. You pass out (lose consciousness) or are very hard to wake up. Call your doctor now if you develop symptoms such as:  Shortness of breath. Fever. Cough.   If you need to get care, call ahead to the doctor's office for instructions before you go. Make sure you wear a face mask, if you have one, to prevent exposing other people to the virus. Where can you get the latest information? The following health organizations are tracking and studying this virus. Their websites contain the most up-to-date information. Yuliana Connolly also learn what to do if you think you may have been exposed to the virus. U.S. Centers for Disease Control and Prevention (CDC): The CDC provides updated news about the disease and travel advice. The website also tells you how to prevent the spread of infection. www.cdc.gov  World Health Organization [facility]): WHO offers information about the virus outbreaks. WHO also has travel advice. www.who.int  Current as of: April 1, 2020               Content Version: 12.4  © 2006-2020 Healthwise, Incorporated. Care instructions adapted under license by your healthcare professional. If you have questions about a medical condition or this instruction, always ask your healthcare professional. [address] any warranty or liability for your use of this information.

## 2022-10-20 NOTE — ROUTINE PROCESS
Darvin Blank  1995  [id number]    Situation:  Verbal report received from: Isidro Olszewski  Procedure: Procedure(s):  ESOPHAGOGASTRODUODENOSCOPY (EGD)  ESOPHAGOGASTRODUODENAL (EGD) BIOPSY  RESOLUTION CLIP    Background:    Preoperative diagnosis: Alcoholic hepatitis with ascites [K70.11]  Postoperative diagnosis: 1. Duodenal Erosions  2. Gastritis  3. Mild Portal Gastropathy    :  Dr. Clark Castandea  Assistant(s): Endoscopy Technician-1: Efrain Hill  Endoscopy RN-1: Monse Ramos RN    Specimens:   ID Type Source Tests Collected by Time Destination   1 : Duodenal Erosions Bx Preservative Duodenum  Louis Alex MD 10/20/2022 1543 Pathology   2 : Antrum Bx Preservative Stomach, Antrum  Louis Alex MD 10/20/2022 1546 Pathology     H. Pylori  no    Assessment:  Intra-procedure medications   Anesthesia gave intra-procedure sedation and medications, see anesthesia flow sheet yes    Intravenous fluids: NS@ KVO     Vital signs stable     Abdominal assessment: round and soft     Recommendation:  Discharge patient per MD order.   Family or Friend Spouse in [address] [place] [address]  Permission to share finding with family or friend yes

## 2022-10-20 NOTE — PROGRESS NOTES
Initial RN admission and assessment performed and documented in Endoscopy navigator. Patient evaluated by anesthesia in pre-procedure holding. All procedural vital signs, airway assessment, and level of consciousness information monitored and recorded by anesthesia staff on the anesthesia record. Report received from CRNA post procedure. Patient transported to recovery area by RN. Endoscope was pre-cleaned at bedside immediately following procedure by Natalya VALDOVINOS Specimens verified w/ tech & MD post procedure.

## 2022-10-20 NOTE — PROGRESS NOTES
Pt reports relapse of alcohol use for the last few days, after being sober for a few months. Pt visibly shaky with headache. BG 82. Anesthesia/Dr. Hamzah Mcelroy notified.

## 2022-10-31 ENCOUNTER — TELEPHONE (OUTPATIENT)
Dept: HEMATOLOGY | Age: 27
End: 2022-10-31

## 2022-11-11 ENCOUNTER — VIRTUAL VISIT (OUTPATIENT)
Dept: FAMILY MEDICINE CLINIC | Age: 27
End: 2022-11-11
Payer: OTHER GOVERNMENT

## 2022-11-11 DIAGNOSIS — F41.9 ANXIETY: Primary | ICD-10-CM

## 2022-11-11 DIAGNOSIS — L70.0 ACNE VULGARIS: ICD-10-CM

## 2022-11-11 PROCEDURE — 99214 OFFICE O/P EST MOD 30 MIN: CPT | Performed by: STUDENT IN AN ORGANIZED HEALTH CARE EDUCATION/TRAINING PROGRAM

## 2022-11-11 RX ORDER — MELATONIN
50 DAILY
COMMUNITY
Start: 2022-09-21 | End: 2022-12-20

## 2022-11-11 RX ORDER — CLONIDINE HYDROCHLORIDE 0.1 MG/1
0.1 TABLET ORAL 2 TIMES DAILY
Qty: 180 TABLET | Refills: 1 | Status: SHIPPED | OUTPATIENT
Start: 2022-11-11

## 2022-11-11 NOTE — PROGRESS NOTES
Chief Complaint   Patient presents with    Follow Up Chronic Condition     1. \"Have you been to the ER, urgent care clinic since your last visit? Hospitalized since your last visit? \" No    2. \"Have you seen or consulted any other health care providers outside of the [address] [place] [address] since your last visit? \" Yes Provider at [facility]      3. For patients aged 39-70: Has the patient had a colonoscopy / FIT/ Cologuard? NA - based on age      If the patient is female:    4. For patients aged 41-77: Has the patient had a mammogram within the past 2 years? NA - based on age or sex      11. For patients aged 21-65: Has the patient had a pap smear?  No      3 most recent PHQ Screens 11/11/2022   Little interest or pleasure in doing things Several days   Feeling down, depressed, irritable, or hopeless Several days   Total Score PHQ 2 2   Trouble falling or staying asleep, or sleeping too much -   Feeling tired or having little energy -   Poor appetite, weight loss, or overeating -   Feeling bad about yourself - or that you are a failure or have let yourself or your family down -   Trouble concentrating on things such as school, work, reading, or watching TV -   Moving or speaking so slowly that other people could have noticed; or the opposite being so fidgety that others notice -   Thoughts of being better off dead, or hurting yourself in some way -   PHQ 9 Score -   How difficult have these problems made it for you to do your work, take care of your home and get along with others -     [address] [place] [address] [phone]

## 2022-11-12 NOTE — PROGRESS NOTES
Consent: Elaina Yun, who was seen by synchronous (real-time) audio-video technology, and/or her healthcare decision maker, is aware that this patient-initiated, Telehealth encounter on 11/11/2022 is a billable service, with coverage as determined by her insurance carrier. She is aware that she may receive a bill and has provided verbal consent to proceed: YES-Consent obtained within past 12 months        Subjective:   Elaina Yun is a 24 y.o. female who was seen for Follow Up Chronic Condition    Presents for follow-up of acne. She was restarted on doxycycline about 3 months ago. She took doxycycline for about 2 months. Helped her acne significantly, but was stopped by her Hepatologist.  She has history of cirrhosis. She has been placed on spironolactone by her Hepatologist.  Overall her acne has improved with the doxycycline and spironolactone. She has an old prescription of tretinoin at home as well. Which she uses occasionally but she is not sure whether it works. She has history of anxiety and depression takes Effexor which helped significantly. She recently tried clonidine which helped her anxiety significantly. She would like a prescription possible. She has been watching her blood pressure which has been normal.  The clonidine was given to her by one of her friends. Her therapist felt was a good idea as well. Current Outpatient Medications on File Prior to Visit   Medication Sig Dispense Refill    cholecalciferol (VITAMIN D3) (1000 Units /25 mcg) tablet Take 50 mcg by mouth daily. pantoprazole (Protonix) 20 mg tablet Take 1 Tablet by mouth daily. 30 Tablet 3    venlafaxine-SR (EFFEXOR-XR) 150 mg capsule Take 1 Capsule by mouth daily. 90 Capsule 3    gabapentin (NEURONTIN) 100 mg capsule take 1 capsule by mouth twice a day 180 Capsule 1    spironolactone (ALDACTONE) 50 mg tablet Take 2 Tablets by mouth daily.  30 Tablet 3    traZODone (DESYREL) 100 mg tablet Take 1 Tablet by mouth nightly. 90 Tablet 1    venlafaxine-SR (EFFEXOR-XR) 75 mg capsule Take 1 Capsule by mouth daily. Total of 225 mg daily in combination with 15o capsule. 90 Capsule 1    doxycycline (ADOXA) 50 mg tablet Take 50 mg by mouth two (2) times a day. (Patient not taking: Reported on 11/11/2022)      naltrexone (DEPADE) 50 mg tablet Take 25 mg by mouth two (2) times a day. (Patient not taking: No sig reported)      propranoloL (INDERAL) 10 mg tablet Take 10 mg by mouth two (2) times a day. (Patient not taking: No sig reported)      furosemide (LASIX) 20 mg tablet Take 1 Tablet by mouth in the morning. (Patient not taking: Reported on 11/11/2022) 30 Tablet 1     No current facility-administered medications on file prior to visit. Allergies   Allergen Reactions    Sulfa (Sulfonamide Antibiotics) Anaphylaxis, Hives and Shortness of Breath     Patient Active Problem List    Diagnosis    Calculus of gallbladder without cholecystitis     Seen on CT. 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 Past Medical History:   Diagnosis Date    Anemia     Chronic kidney disease     Chronic pain     Contact dermatitis and eczema due to cause     Depression     Liver disease     Polycystic ovarian syndrome        Review of Systems   All other systems reviewed and are negative. Objective:   Vital Signs: (As obtained by patient/caregiver at home)  There were no vitals taken for this visit.      [INSTRUCTIONS:  \"[x]\" Indicates a positive item  \"[]\" Indicates a negative item  -- DELETE ALL ITEMS NOT EXAMINED]    Constitutional: [x] Appears well-developed and well-nourished [x] No apparent distress      [] Abnormal -     Mental status: [x] Alert and awake  [x] Oriented to person/place/time [x] Able to follow commands    [] Abnormal -     Eyes:   EOM    [x]  Normal    [] Abnormal -   Sclera  [x]  Normal    [] Abnormal -          Discharge [x]  None visible   [] Abnormal -     HENT: [x] Normocephalic, atraumatic  [] Abnormal -   [x] Mouth/Throat: Mucous membranes are moist    External Ears [x] Normal  [] Abnormal -    Neck: [x] No visualized mass [] Abnormal -     Pulmonary/Chest: [x] Respiratory effort normal   [x] No visualized signs of difficulty breathing or respiratory distress        [] Abnormal -        Neurological:        [x] No Facial Asymmetry (Cranial nerve 7 motor function) (limited exam due to video visit)          [x] No gaze palsy        [] Abnormal -          Skin:        [x] No significant exanthematous lesions or discoloration noted on facial skin         [] Abnormal -            Psychiatric:       [x] Normal Affect [] Abnormal -        [x] No Hallucinations    Other pertinent observable physical exam findings:-              Assessment & Plan:       ICD-10-CM ICD-9-CM    1. Anxiety  F41.9 300.00 cloNIDine HCL (CATAPRES) 0.1 mg tablet      2. Acne vulgaris  L70.0 706.1          Acne-acne improved with doxycycline, now is maintaining on spironolactone. Doxycycline was stopped by her Hepatologist.  Continue current management for now. If acne worsens she can try topical tretinoin again, and she can try benzyl peroxide again as well. Anxiety/depression-symptoms have improved with Effexor but continues to have some anxiety. Has recently tried clonidine which helped significantly and did not have any side effects. Clonidine has been ordered today. Advised to watch her blood pressure. Follow-up and Dispositions    Return in about 4 months (around 3/11/2023) for Anxiety. We discussed the expected course, resolution and complications of the diagnosis(es) in detail.   Medication risks, benefits, costs, interactions, and alternatives were discussed as indicated. I advised her to contact the office if her condition worsens, changes or fails to improve as anticipated. She expressed understanding with the diagnosis(es) and plan. Estiven Thomas is a 24 y.o. female being evaluated by a video visit encounter for concerns as above. A caregiver was present when appropriate. Due to this being a TeleHealth encounter (During [place] [address] public health emergency), evaluation of the following organ systems was limited: Vitals/Constitutional/EENT/Resp/CV/GI//MS/Neuro/Skin/Heme-Lymph-Imm. Pursuant to the emergency declaration under the [facility], 1135 waiver authority and the "[employer] and [employer] Act, this Virtual  Visit was conducted, with patient's (and/or legal guardian's) consent, to reduce the patient's risk of exposure to COVID-19 and provide necessary medical care. Services were provided through a video synchronous discussion virtually to substitute for in-person clinic visit. Patient and provider were located at their individual homes.         Maricruz Johnson MD

## 2022-11-20 ENCOUNTER — HOSPITAL ENCOUNTER (EMERGENCY)
Age: 27
Discharge: HOME | End: 2022-11-20
Attending: EMERGENCY MEDICINE
Payer: OTHER GOVERNMENT

## 2022-11-20 VITALS
RESPIRATION RATE: 20 BRPM | HEART RATE: 95 BPM | OXYGEN SATURATION: 99 % | TEMPERATURE: 98.3 F | DIASTOLIC BLOOD PRESSURE: 72 MMHG | SYSTOLIC BLOOD PRESSURE: 145 MMHG

## 2022-11-20 DIAGNOSIS — F10.29 ALCOHOL DEPENDENCE WITH UNSPECIFIED ALCOHOL-INDUCED DISORDER (HCC): Primary | ICD-10-CM

## 2022-11-20 DIAGNOSIS — R79.89 ABNORMAL LFTS: ICD-10-CM

## 2022-11-20 LAB
ALBUMIN SERPL-MCNC: 4.2 G/DL (ref 3.5–5)
ALBUMIN/GLOB SERPL: 0.9 {RATIO} (ref 1.1–2.2)
ALP SERPL-CCNC: 143 U/L (ref 45–117)
ALT SERPL-CCNC: 81 U/L (ref 12–78)
ANION GAP SERPL CALC-SCNC: 6 MMOL/L (ref 5–15)
APPEARANCE UR: ABNORMAL
AST SERPL-CCNC: 147 U/L (ref 15–37)
BACTERIA URNS QL MICRO: ABNORMAL /HPF
BASOPHILS # BLD: 0 K/UL (ref 0–0.1)
BASOPHILS NFR BLD: 1 % (ref 0–1)
BILIRUB SERPL-MCNC: 1.1 MG/DL (ref 0.2–1)
BILIRUB UR QL: NEGATIVE
BUN SERPL-MCNC: 11 MG/DL (ref 6–20)
BUN/CREAT SERPL: 15 (ref 12–20)
CALCIUM SERPL-MCNC: 9.4 MG/DL (ref 8.5–10.1)
CHLORIDE SERPL-SCNC: 107 MMOL/L (ref 97–108)
CO2 SERPL-SCNC: 30 MMOL/L (ref 21–32)
COLOR UR: ABNORMAL
CREAT SERPL-MCNC: 0.75 MG/DL (ref 0.55–1.02)
DIFFERENTIAL METHOD BLD: ABNORMAL
EOSINOPHIL # BLD: 0.2 K/UL (ref 0–0.4)
EOSINOPHIL NFR BLD: 2 % (ref 0–7)
EPITH CASTS URNS QL MICRO: ABNORMAL /LPF
ERYTHROCYTE [DISTWIDTH] IN BLOOD BY AUTOMATED COUNT: 15.9 % (ref 11.5–14.5)
ETHANOL SERPL-MCNC: 234 MG/DL
GLOBULIN SER CALC-MCNC: 4.8 G/DL (ref 2–4)
GLUCOSE SERPL-MCNC: 98 MG/DL (ref 65–100)
GLUCOSE UR STRIP.AUTO-MCNC: NEGATIVE MG/DL
HCG UR QL: NEGATIVE
HCT VFR BLD AUTO: 42.8 % (ref 35–47)
HGB BLD-MCNC: 14.1 G/DL (ref 11.5–16)
HGB UR QL STRIP: ABNORMAL
HYALINE CASTS URNS QL MICRO: ABNORMAL /LPF (ref 0–5)
IMM GRANULOCYTES # BLD AUTO: 0 K/UL (ref 0–0.04)
IMM GRANULOCYTES NFR BLD AUTO: 0 % (ref 0–0.5)
KETONES UR QL STRIP.AUTO: NEGATIVE MG/DL
LEUKOCYTE ESTERASE UR QL STRIP.AUTO: ABNORMAL
LYMPHOCYTES # BLD: 2 K/UL (ref 0.8–3.5)
LYMPHOCYTES NFR BLD: 25 % (ref 12–49)
MAGNESIUM SERPL-MCNC: 1.9 MG/DL (ref 1.6–2.4)
MCH RBC QN AUTO: 31.1 PG (ref 26–34)
MCHC RBC AUTO-ENTMCNC: 32.9 G/DL (ref 30–36.5)
MCV RBC AUTO: 94.3 FL (ref 80–99)
MONOCYTES # BLD: 0.6 K/UL (ref 0–1)
MONOCYTES NFR BLD: 7 % (ref 5–13)
NEUTS SEG # BLD: 5.2 K/UL (ref 1.8–8)
NEUTS SEG NFR BLD: 65 % (ref 32–75)
NITRITE UR QL STRIP.AUTO: POSITIVE
NRBC # BLD: 0 K/UL (ref 0–0.01)
NRBC BLD-RTO: 0 PER 100 WBC
PH UR STRIP: 7.5 [PH] (ref 5–8)
PLATELET # BLD AUTO: 127 K/UL (ref 150–400)
PMV BLD AUTO: 9.1 FL (ref 8.9–12.9)
POTASSIUM SERPL-SCNC: 3.9 MMOL/L (ref 3.5–5.1)
PROT SERPL-MCNC: 9 G/DL (ref 6.4–8.2)
PROT UR STRIP-MCNC: ABNORMAL MG/DL
RBC # BLD AUTO: 4.54 M/UL (ref 3.8–5.2)
RBC #/AREA URNS HPF: ABNORMAL /HPF (ref 0–5)
SODIUM SERPL-SCNC: 143 MMOL/L (ref 136–145)
SP GR UR REFRACTOMETRY: 1.02 (ref 1–1.03)
UR CULT HOLD, URHOLD: NORMAL
UROBILINOGEN UR QL STRIP.AUTO: 1 EU/DL (ref 0.2–1)
WBC # BLD AUTO: 8 K/UL (ref 3.6–11)
WBC URNS QL MICRO: ABNORMAL /HPF (ref 0–4)

## 2022-11-20 PROCEDURE — 99284 EMERGENCY DEPT VISIT MOD MDM: CPT

## 2022-11-20 PROCEDURE — 83735 ASSAY OF MAGNESIUM: CPT

## 2022-11-20 PROCEDURE — 74011250636 HC RX REV CODE- 250/636: Performed by: STUDENT IN AN ORGANIZED HEALTH CARE EDUCATION/TRAINING PROGRAM

## 2022-11-20 PROCEDURE — 81001 URINALYSIS AUTO W/SCOPE: CPT

## 2022-11-20 PROCEDURE — 82077 ASSAY SPEC XCP UR&BREATH IA: CPT

## 2022-11-20 PROCEDURE — 96361 HYDRATE IV INFUSION ADD-ON: CPT

## 2022-11-20 PROCEDURE — 36415 COLL VENOUS BLD VENIPUNCTURE: CPT

## 2022-11-20 PROCEDURE — 74011250637 HC RX REV CODE- 250/637: Performed by: STUDENT IN AN ORGANIZED HEALTH CARE EDUCATION/TRAINING PROGRAM

## 2022-11-20 PROCEDURE — 96360 HYDRATION IV INFUSION INIT: CPT

## 2022-11-20 PROCEDURE — 85025 COMPLETE CBC W/AUTO DIFF WBC: CPT

## 2022-11-20 PROCEDURE — 81025 URINE PREGNANCY TEST: CPT

## 2022-11-20 PROCEDURE — 80053 COMPREHEN METABOLIC PANEL: CPT

## 2022-11-20 RX ORDER — DIAZEPAM 10 MG/1
5 TABLET ORAL
Qty: 30 TABLET | Refills: 0 | Status: SHIPPED | OUTPATIENT
Start: 2022-11-20

## 2022-11-20 RX ORDER — IBUPROFEN 400 MG/1
800 TABLET ORAL ONCE
Status: COMPLETED | OUTPATIENT
Start: 2022-11-20 | End: 2022-11-20

## 2022-11-20 RX ORDER — DIAZEPAM 5 MG/1
10 TABLET ORAL
Status: DISCONTINUED | OUTPATIENT
Start: 2022-11-20 | End: 2022-11-21 | Stop reason: HOSPADM

## 2022-11-20 RX ORDER — ACETAMINOPHEN 500 MG
1000 TABLET ORAL ONCE
Status: DISCONTINUED | OUTPATIENT
Start: 2022-11-20 | End: 2022-11-20

## 2022-11-20 RX ORDER — LORAZEPAM 0.5 MG/1
1 TABLET ORAL
Status: COMPLETED | OUTPATIENT
Start: 2022-11-20 | End: 2022-11-20

## 2022-11-20 RX ORDER — FOLIC ACID 1 MG/1
1 TABLET ORAL ONCE
Status: DISCONTINUED | OUTPATIENT
Start: 2022-11-20 | End: 2022-11-21 | Stop reason: HOSPADM

## 2022-11-20 RX ORDER — CEPHALEXIN 500 MG/1
500 CAPSULE ORAL 4 TIMES DAILY
Qty: 28 CAPSULE | Refills: 0 | Status: SHIPPED | OUTPATIENT
Start: 2022-11-20 | End: 2022-11-27

## 2022-11-20 RX ADMIN — IBUPROFEN 800 MG: 400 TABLET, FILM COATED ORAL at 22:58

## 2022-11-20 RX ADMIN — LORAZEPAM 1 MG: 0.5 TABLET ORAL at 21:27

## 2022-11-20 RX ADMIN — SODIUM CHLORIDE 1000 ML: 9 INJECTION, SOLUTION INTRAVENOUS at 21:27

## 2022-11-21 NOTE — ED TRIAGE NOTES
She says she is here for detox from ETOH. She says she last drank two hours ago. She says she has had a sore throat for a couple of days. She says she also is having some \"respiratory distress\" for several days. She takes clonidine for anxiety but hasn't had any recently.

## 2022-11-21 NOTE — ED PROVIDER NOTES
22-year-old female with history of anemia, CKD, depression, liver disease, PCOS and alcohol dependence presents to ED with concern of detox from alcohol. She reports that for the past week she has been drinking 1 L of vodka per day. Prior to that she was sober for about 3 weeks but has been drinking \"on and off for years\". She has been in programs and rehab before, most recently about 3-4 months ago. She reports that she has been through withdrawals before and has had DT but never seizures. She also notes that she sees Dr. Melvin Bennett for d=stage 4 liver disease with cirrhosis. Most recently drank a couple of hours ago. She reports that she is feeling shaky, has a headache. Denies any N/V/D, fevers, chills, CP, SOB, SI/HI, visual or auditory hallucinations. Alcohol Problem  Pertinent negatives include no fever, no nausea and no vomiting.       Past Medical History:   Diagnosis Date    Anemia     Chronic kidney disease     Chronic pain     Contact dermatitis and eczema due to cause     Depression     Liver disease     Polycystic ovarian syndrome        Past Surgical History:   Procedure Laterality Date    HX ADENOIDECTOMY  01/01/2001    HX CHOLECYSTECTOMY  10/2022    HX COLONOSCOPY      HX ENDOSCOPY      HX TONSILLECTOMY  01/01/2010    HX WISDOM TEETH EXTRACTION           Family History:   Problem Relation Age of Onset    Alcohol abuse Father     Diabetes Maternal Grandfather     Other Maternal Grandmother         macular degeneration    Cancer Maternal Grandmother         skin    Depression Mother     Atopy Sister     No Known Problems Brother     No Known Problems Paternal Grandmother     No Known Problems Paternal Grandfather     Heart Disease Neg Hx     Hypertension Neg Hx        Social History     Socioeconomic History    Marital status:      Spouse name: Not on file    Number of children: Not on file    Years of education: Not on file    Highest education level: Not on file   Occupational History Not on file   Tobacco Use    Smoking status: Every Day     Packs/day: 0.50     Years: 1.00     Pack years: 0.50     Types: Cigarettes     Last attempt to quit: 2021     Years since quittin.9    Smokeless tobacco: Never   Vaping Use    Vaping Use: Every day    Start date: 2021    Substances: Nicotine    Devices: Disposable   Substance and Sexual Activity    Alcohol use: Not Currently     Comment: intermittently per pt    Drug use: Yes     Types: Marijuana     Comment: daily    Sexual activity: Yes     Partners: Female     Birth control/protection: Condom, Pill   Other Topics Concern     Service Not Asked    Blood Transfusions Not Asked    Caffeine Concern Not Asked    Occupational Exposure Not Asked    Hobby Hazards Not Asked    Sleep Concern Not Asked    Stress Concern Not Asked    Weight Concern Not Asked    Special Diet Not Asked    Back Care Not Asked    Exercise Not Asked    Bike Helmet Not Asked    Seat Belt Not Asked    Self-Exams Not Asked   Social History Narrative    Not on file     Social Determinants of Health     Financial Resource Strain: Low Risk     Difficulty of Paying Living Expenses: Not hard at all   Food Insecurity: No Food Insecurity    Worried About Running Out of Food in the Last Year: Never true    Ran Out of Food in the Last Year: Never true   Transportation Needs: Not on file   Physical Activity: Not on file   Stress: Not on file   Social Connections: Not on file   Intimate Partner Violence: Not on file   Housing Stability: Not on file         ALLERGIES: Sulfa (sulfonamide antibiotics)    Review of Systems   Constitutional:  Negative for fever. HENT:  Negative for congestion and sinus pressure. Respiratory:  Negative for shortness of breath. Cardiovascular:  Negative for chest pain. Gastrointestinal:  Negative for nausea and vomiting. Genitourinary:  Negative for dysuria. Musculoskeletal:  Negative for myalgias. Neurological:  Positive for headaches. Negative for dizziness. Hematological:  Negative for adenopathy. Psychiatric/Behavioral:  The patient is not nervous/anxious. All other systems reviewed and are negative. Vitals:    11/20/22 2017   BP: (!) 150/83   Pulse: (!) 107   Resp: 20   Temp: 98.3 °F (36.8 °C)   SpO2: 99%            Physical Exam  Vitals and nursing note reviewed. Constitutional:       General: She is not in acute distress. Appearance: Normal appearance. She is normal weight. HENT:      Head: Normocephalic and atraumatic. Eyes:      Extraocular Movements: Extraocular movements intact. Pupils: Pupils are equal, round, and reactive to light. Cardiovascular:      Rate and Rhythm: Normal rate and regular rhythm. Heart sounds: Normal heart sounds. Pulmonary:      Breath sounds: Normal breath sounds. Abdominal:      Palpations: Abdomen is soft. Tenderness: There is no abdominal tenderness. Lymphadenopathy:      Cervical: No cervical adenopathy. Skin:     General: Skin is warm and dry. Neurological:      General: No focal deficit present. Mental Status: She is alert and oriented to person, place, and time. Cranial Nerves: No cranial nerve deficit. Motor: No weakness. Deep Tendon Reflexes: Reflexes normal.   Psychiatric:         Mood and Affect: Mood normal.         Behavior: Behavior normal.         Thought Content: Thought content normal.        MDM  Number of Diagnoses or Management Options  Abnormal LFTs  Alcohol dependence with unspecified alcohol-induced disorder [facility])  Diagnosis management comments: 28-year-old female with history of anemia, CKD, depression, liver disease, PCOS and alcohol dependence presents to ED with concern of detox from alcohol. Vital signs stable in triage with initial pulse at 107 which later decreased to 95. Patient afebrile. Physical exam unremarkable.   Labs notable for indication of urinary tract infection with nitrates and leukocyte Estrace as well as elevated EtOH level but otherwise labs unremarkable. Patient received IV fluids, Ativan and Motrin while in ED with improvement of symptoms. Placement was consulted and saw patient and gave resources for outpatient management. Patient will be discharged with prescription for Valium and Keflex as well as instructions for conservative care, follow-up and return precautions.        Amount and/or Complexity of Data Reviewed  Clinical lab tests: reviewed           Procedures

## 2022-11-27 DIAGNOSIS — F10.21 ALCOHOL USE DISORDER, MODERATE, IN EARLY REMISSION (HCC): Primary | ICD-10-CM

## 2022-11-27 RX ORDER — NALTREXONE HYDROCHLORIDE 50 MG/1
50 TABLET, FILM COATED ORAL DAILY
Qty: 30 TABLET | Refills: 2 | Status: SHIPPED | OUTPATIENT
Start: 2022-11-27

## 2022-12-05 ENCOUNTER — VIRTUAL VISIT (OUTPATIENT)
Dept: FAMILY MEDICINE CLINIC | Age: 27
End: 2022-12-05
Payer: OTHER GOVERNMENT

## 2022-12-05 DIAGNOSIS — K70.30 ALCOHOLIC CIRRHOSIS OF LIVER WITHOUT ASCITES (HCC): ICD-10-CM

## 2022-12-05 DIAGNOSIS — N39.0 URINARY TRACT INFECTION WITHOUT HEMATURIA, SITE UNSPECIFIED: Primary | ICD-10-CM

## 2022-12-05 PROCEDURE — 99213 OFFICE O/P EST LOW 20 MIN: CPT | Performed by: STUDENT IN AN ORGANIZED HEALTH CARE EDUCATION/TRAINING PROGRAM

## 2022-12-05 RX ORDER — SPIRONOLACTONE 50 MG/1
100 TABLET, FILM COATED ORAL DAILY
Qty: 30 TABLET | Refills: 3 | Status: SHIPPED | OUTPATIENT
Start: 2022-12-05

## 2022-12-05 RX ORDER — NITROFURANTOIN 25; 75 MG/1; MG/1
100 CAPSULE ORAL 2 TIMES DAILY
Qty: 10 CAPSULE | Refills: 0 | Status: SHIPPED | OUTPATIENT
Start: 2022-12-05

## 2022-12-05 NOTE — PROGRESS NOTES
[phone]    Chief Complaint   Patient presents with    UTI     1. Have you been to the ER, urgent care clinic since your last visit? Hospitalized since your last visit? Yes When: 11/20/2022 went to ER     2. Have you seen or consulted any other health care providers outside of the [address] [place] [address] since your last visit? Include any pap smears or colon screening.  No

## 2022-12-05 NOTE — PROGRESS NOTES
Consent: Darvin Blank, who was seen by synchronous (real-time) audio-video technology, and/or her healthcare decision maker, is aware that this patient-initiated, Telehealth encounter on 12/5/2022 is a billable service, with coverage as determined by her insurance carrier. She is aware that she may receive a bill and has provided verbal consent to proceed: YES-Consent obtained within past 12 months        Subjective:   Darvin Blank is a 24 y.o. female who was seen for UTI    Patient has UTI symptoms including frequency, and burning. She was given Keflex previously a couple weeks ago but was unable to tolerate it due to taking it 4 times a day. When she was first given Keflex  she had no symptoms of UTI but was seen on labs. Now she is having symptoms. Denies any abnormal back pain or fever. She does have history of liver disease. Allergic to sulfa. No visible blood in urine. Of note the urine culture previously done a couple weeks ago was negative. Current Outpatient Medications on File Prior to Visit   Medication Sig Dispense Refill    naltrexone (DEPADE) 50 mg tablet Take 1 Tablet by mouth daily. 30 Tablet 2    diazePAM (Valium) 10 mg tablet Take 0.5 Tablets by mouth every six (6) hours as needed (alcohol withdrawal symptoms). Max Daily Amount: 20 mg. 30 Tablet 0    cholecalciferol (VITAMIN D3) (1000 Units /25 mcg) tablet Take 50 mcg by mouth daily. cloNIDine HCL (CATAPRES) 0.1 mg tablet Take 1 Tablet by mouth two (2) times a day. 180 Tablet 1    pantoprazole (Protonix) 20 mg tablet Take 1 Tablet by mouth daily. 30 Tablet 3    venlafaxine-SR (EFFEXOR-XR) 150 mg capsule Take 1 Capsule by mouth daily. 90 Capsule 3    gabapentin (NEURONTIN) 100 mg capsule take 1 capsule by mouth twice a day 180 Capsule 1    spironolactone (ALDACTONE) 50 mg tablet Take 2 Tablets by mouth daily. 30 Tablet 3    traZODone (DESYREL) 100 mg tablet Take 1 Tablet by mouth nightly.  90 Tablet 1    venlafaxine-SR (EFFEXOR-XR) 75 mg capsule Take 1 Capsule by mouth daily. Total of 225 mg daily in combination with 15o capsule. 90 Capsule 1     No current facility-administered medications on file prior to visit. Allergies   Allergen Reactions    Sulfa (Sulfonamide Antibiotics) Anaphylaxis, Hives and Shortness of Breath     Patient Active Problem List    Diagnosis    Calculus of gallbladder without cholecystitis     Seen on CT. 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 Past Medical History:   Diagnosis Date    Anemia     Chronic kidney disease     Chronic pain     Contact dermatitis and eczema due to cause     Depression     Liver disease     Polycystic ovarian syndrome        Review of Systems   All other systems reviewed and are negative.     Objective:   Vital Signs: (As obtained by patient/caregiver at home)  Visit Vitals  LMP  (LMP Unknown)        [INSTRUCTIONS:  \"[x]\" Indicates a positive item  \"[]\" Indicates a negative item  -- DELETE ALL ITEMS NOT EXAMINED]    Constitutional: [x] Appears well-developed and well-nourished [x] No apparent distress      [] Abnormal -     Mental status: [x] Alert and awake  [x] Oriented to person/place/time [x] Able to follow commands    [] Abnormal -     Eyes:   EOM    [x]  Normal    [] Abnormal -   Sclera  [x]  Normal    [] Abnormal -          Discharge [x]  None visible   [] Abnormal -     HENT: [x] Normocephalic, atraumatic  [] Abnormal -   [x] Mouth/Throat: Mucous membranes are moist    External Ears [x] Normal  [] Abnormal -    Neck: [x] No visualized mass [] Abnormal -     Pulmonary/Chest: [x] Respiratory effort normal   [x] No visualized signs of difficulty breathing or respiratory distress        [] Abnormal - Neurological:        [x] No Facial Asymmetry (Cranial nerve 7 motor function) (limited exam due to video visit)          [x] No gaze palsy        [] Abnormal -          Skin:        [x] No significant exanthematous lesions or discoloration noted on facial skin         [] Abnormal -            Psychiatric:       [x] Normal Affect [] Abnormal -        [x] No Hallucinations    Other pertinent observable physical exam findings:-              Assessment & Plan:   Diagnoses and all orders for this visit:    1. Urinary tract infection without hematuria, site unspecified  -     nitrofurantoin, macrocrystal-monohydrate, (Macrobid) 100 mg capsule; Take 1 Capsule by mouth two (2) times a day. Patient was unable to make it the office today so [place] was ordered. If symptoms not improve she will need to come by to give a urine sample. We discussed the expected course, resolution and complications of the diagnosis(es) in detail. Medication risks, benefits, costs, interactions, and alternatives were discussed as indicated. I advised her to contact the office if her condition worsens, changes or fails to improve as anticipated. She expressed understanding with the diagnosis(es) and plan. Petar Segura is a 24 y.o. female being evaluated by a video visit encounter for concerns as above. A caregiver was present when appropriate. Due to this being a TeleHealth encounter (During YNCCT-60 public health emergency), evaluation of the following organ systems was limited: Vitals/Constitutional/EENT/Resp/CV/GI//MS/Neuro/Skin/Heme-Lymph-Imm.   Pursuant to the emergency declaration under the [facility]5 waiver authority and the "[employer] and [employer] Act, this Virtual  Visit was conducted, with patient's (and/or legal guardian's) consent, to reduce the patient's risk of exposure to COVID-19 and provide necessary medical care.     Services were provided through a video synchronous discussion virtually to substitute for in-person clinic visit. Patient and provider were located at their individual homes.         Chandana Yeung MD

## 2022-12-14 ENCOUNTER — HOSPITAL ENCOUNTER (INPATIENT)
Age: 27
LOS: 1 days | Discharge: HOME | DRG: 898 | End: 2022-12-15
Attending: EMERGENCY MEDICINE | Admitting: HOSPITALIST
Payer: OTHER GOVERNMENT

## 2022-12-14 DIAGNOSIS — F10.10 ALCOHOL ABUSE: Primary | ICD-10-CM

## 2022-12-14 DIAGNOSIS — F10.930 ALCOHOL WITHDRAWAL SYNDROME WITHOUT COMPLICATION (HCC): ICD-10-CM

## 2022-12-14 PROBLEM — F10.939 ALCOHOL WITHDRAWAL (HCC): Status: ACTIVE | Noted: 2022-12-14

## 2022-12-14 LAB
ALBUMIN SERPL-MCNC: 4.2 G/DL (ref 3.5–5)
ALBUMIN/GLOB SERPL: 0.9 {RATIO} (ref 1.1–2.2)
ALP SERPL-CCNC: 122 U/L (ref 45–117)
ALT SERPL-CCNC: 65 U/L (ref 12–78)
AMPHET UR QL SCN: NEGATIVE
AMYLASE SERPL-CCNC: 66 U/L (ref 25–115)
ANION GAP SERPL CALC-SCNC: 7 MMOL/L (ref 5–15)
APPEARANCE UR: CLEAR
AST SERPL W P-5'-P-CCNC: 103 U/L (ref 15–37)
ATRIAL RATE: 130 BPM
BACTERIA URNS QL MICRO: NEGATIVE /HPF
BARBITURATES UR QL SCN: NEGATIVE
BASOPHILS # BLD: 0.1 K/UL (ref 0–0.1)
BASOPHILS NFR BLD: 1 % (ref 0–1)
BENZODIAZ UR QL: POSITIVE
BILIRUB SERPL-MCNC: 1.1 MG/DL (ref 0.2–1)
BILIRUB UR QL: NEGATIVE
BUN SERPL-MCNC: 7 MG/DL (ref 6–20)
BUN/CREAT SERPL: 10 (ref 12–20)
CA-I BLD-MCNC: 9.4 MG/DL (ref 8.5–10.1)
CALCULATED P AXIS, ECG09: 66 DEGREES
CALCULATED R AXIS, ECG10: 83 DEGREES
CALCULATED T AXIS, ECG11: 49 DEGREES
CANNABINOIDS UR QL SCN: POSITIVE
CHLORIDE SERPL-SCNC: 105 MMOL/L (ref 97–108)
CO2 SERPL-SCNC: 29 MMOL/L (ref 21–32)
COCAINE UR QL SCN: NEGATIVE
COLOR UR: YELLOW
CREAT SERPL-MCNC: 0.67 MG/DL (ref 0.55–1.02)
DIAGNOSIS, 93000: NORMAL
DIFFERENTIAL METHOD BLD: ABNORMAL
DRUG SCRN COMMENT,DRGCM: ABNORMAL
EOSINOPHIL # BLD: 0.1 K/UL (ref 0–0.4)
EOSINOPHIL NFR BLD: 1 % (ref 0–7)
ERYTHROCYTE [DISTWIDTH] IN BLOOD BY AUTOMATED COUNT: 14.1 % (ref 11.5–14.5)
ETHANOL SERPL-MCNC: 330 MG/DL
FOLATE SERPL-MCNC: 6.5 NG/ML (ref 5–21)
GLOBULIN SER CALC-MCNC: 4.6 G/DL (ref 2–4)
GLUCOSE SERPL-MCNC: 128 MG/DL (ref 65–100)
GLUCOSE UR STRIP.AUTO-MCNC: NORMAL MG/DL
HCG SERPL QL: NEGATIVE
HCT VFR BLD AUTO: 40.6 % (ref 35–47)
HGB BLD-MCNC: 13.1 G/DL (ref 11.5–16)
HGB UR QL STRIP: NEGATIVE
IMM GRANULOCYTES # BLD AUTO: 0 K/UL (ref 0–0.04)
IMM GRANULOCYTES NFR BLD AUTO: 0 % (ref 0–0.5)
KETONES UR QL STRIP.AUTO: NEGATIVE MG/DL
LEUKOCYTE ESTERASE UR QL STRIP.AUTO: NEGATIVE
LIPASE SERPL-CCNC: 156 U/L (ref 73–393)
LYMPHOCYTES # BLD: 1.8 K/UL (ref 0.8–3.5)
LYMPHOCYTES NFR BLD: 17 % (ref 12–49)
MAGNESIUM SERPL-MCNC: 2 MG/DL (ref 1.6–2.4)
MCH RBC QN AUTO: 29.4 PG (ref 26–34)
MCHC RBC AUTO-ENTMCNC: 32.3 G/DL (ref 30–36.5)
MCV RBC AUTO: 91 FL (ref 80–99)
METHADONE UR QL: NEGATIVE
MONOCYTES # BLD: 0.7 K/UL (ref 0–1)
MONOCYTES NFR BLD: 6 % (ref 5–13)
NEUTS SEG # BLD: 8.1 K/UL (ref 1.8–8)
NEUTS SEG NFR BLD: 75 % (ref 32–75)
NITRITE UR QL STRIP.AUTO: NEGATIVE
NRBC # BLD: 0 K/UL (ref 0–0.01)
NRBC BLD-RTO: 0 PER 100 WBC
OPIATES UR QL: NEGATIVE
P-R INTERVAL, ECG05: 124 MS
PCP UR QL: NEGATIVE
PH UR STRIP: 7 [PH] (ref 5–8)
PLATELET # BLD AUTO: 159 K/UL (ref 150–400)
PMV BLD AUTO: 9.6 FL (ref 8.9–12.9)
POTASSIUM SERPL-SCNC: 3.6 MMOL/L (ref 3.5–5.1)
PROT SERPL-MCNC: 8.8 G/DL (ref 6.4–8.2)
PROT UR STRIP-MCNC: NEGATIVE MG/DL
Q-T INTERVAL, ECG07: 294 MS
QRS DURATION, ECG06: 74 MS
QTC CALCULATION (BEZET), ECG08: 432 MS
RBC # BLD AUTO: 4.46 M/UL (ref 3.8–5.2)
RBC #/AREA URNS HPF: ABNORMAL /HPF (ref 0–5)
SODIUM SERPL-SCNC: 141 MMOL/L (ref 136–145)
SP GR UR REFRACTOMETRY: 1.01 (ref 1–1.03)
SP GR UR REFRACTOMETRY: 1.01 (ref 1–1.03)
UROBILINOGEN UR QL STRIP.AUTO: 1 EU/DL (ref 0.1–1)
VENTRICULAR RATE, ECG03: 130 BPM
WBC # BLD AUTO: 10.7 K/UL (ref 3.6–11)
WBC URNS QL MICRO: ABNORMAL /HPF (ref 0–4)

## 2022-12-14 PROCEDURE — 82150 ASSAY OF AMYLASE: CPT

## 2022-12-14 PROCEDURE — 74011000250 HC RX REV CODE- 250: Performed by: HOSPITALIST

## 2022-12-14 PROCEDURE — 82746 ASSAY OF FOLIC ACID SERUM: CPT

## 2022-12-14 PROCEDURE — 74011250637 HC RX REV CODE- 250/637: Performed by: EMERGENCY MEDICINE

## 2022-12-14 PROCEDURE — 93005 ELECTROCARDIOGRAM TRACING: CPT

## 2022-12-14 PROCEDURE — 36415 COLL VENOUS BLD VENIPUNCTURE: CPT

## 2022-12-14 PROCEDURE — 65270000029 HC RM PRIVATE

## 2022-12-14 PROCEDURE — 84703 CHORIONIC GONADOTROPIN ASSAY: CPT

## 2022-12-14 PROCEDURE — 99285 EMERGENCY DEPT VISIT HI MDM: CPT

## 2022-12-14 PROCEDURE — 74011250637 HC RX REV CODE- 250/637: Performed by: HOSPITALIST

## 2022-12-14 PROCEDURE — 74011250636 HC RX REV CODE- 250/636: Performed by: EMERGENCY MEDICINE

## 2022-12-14 PROCEDURE — 74011250636 HC RX REV CODE- 250/636: Performed by: HOSPITALIST

## 2022-12-14 PROCEDURE — 96374 THER/PROPH/DIAG INJ IV PUSH: CPT

## 2022-12-14 PROCEDURE — 83690 ASSAY OF LIPASE: CPT

## 2022-12-14 PROCEDURE — 85025 COMPLETE CBC W/AUTO DIFF WBC: CPT

## 2022-12-14 PROCEDURE — 80053 COMPREHEN METABOLIC PANEL: CPT

## 2022-12-14 PROCEDURE — 82077 ASSAY SPEC XCP UR&BREATH IA: CPT

## 2022-12-14 PROCEDURE — 80307 DRUG TEST PRSMV CHEM ANLYZR: CPT

## 2022-12-14 PROCEDURE — 81003 URINALYSIS AUTO W/O SCOPE: CPT

## 2022-12-14 PROCEDURE — 83735 ASSAY OF MAGNESIUM: CPT

## 2022-12-14 RX ORDER — PANTOPRAZOLE SODIUM 20 MG/1
20 TABLET, DELAYED RELEASE ORAL DAILY
Status: DISCONTINUED | OUTPATIENT
Start: 2022-12-15 | End: 2022-12-15 | Stop reason: HOSPADM

## 2022-12-14 RX ORDER — ONDANSETRON 4 MG/1
4 TABLET, ORALLY DISINTEGRATING ORAL
Status: DISCONTINUED | OUTPATIENT
Start: 2022-12-14 | End: 2022-12-15 | Stop reason: HOSPADM

## 2022-12-14 RX ORDER — ONDANSETRON 2 MG/ML
4 INJECTION INTRAMUSCULAR; INTRAVENOUS
Status: DISCONTINUED | OUTPATIENT
Start: 2022-12-14 | End: 2022-12-15 | Stop reason: HOSPADM

## 2022-12-14 RX ORDER — CHLORDIAZEPOXIDE HYDROCHLORIDE 25 MG/1
25 CAPSULE, GELATIN COATED ORAL
Qty: 30 CAPSULE | Refills: 0 | Status: SHIPPED | OUTPATIENT
Start: 2022-12-14

## 2022-12-14 RX ORDER — CLONIDINE HYDROCHLORIDE 0.1 MG/1
0.1 TABLET ORAL 2 TIMES DAILY
Status: DISCONTINUED | OUTPATIENT
Start: 2022-12-14 | End: 2022-12-15 | Stop reason: HOSPADM

## 2022-12-14 RX ORDER — ONDANSETRON 4 MG/1
4 TABLET, ORALLY DISINTEGRATING ORAL
Qty: 15 TABLET | Refills: 0 | Status: SHIPPED | OUTPATIENT
Start: 2022-12-14

## 2022-12-14 RX ORDER — ASPIRIN 325 MG/1
100 TABLET, FILM COATED ORAL DAILY
Status: COMPLETED | OUTPATIENT
Start: 2022-12-14 | End: 2022-12-14

## 2022-12-14 RX ORDER — ACETAMINOPHEN 650 MG/1
650 SUPPOSITORY RECTAL
Status: DISCONTINUED | OUTPATIENT
Start: 2022-12-14 | End: 2022-12-15 | Stop reason: HOSPADM

## 2022-12-14 RX ORDER — TRAZODONE HYDROCHLORIDE 50 MG/1
100 TABLET ORAL
Status: DISCONTINUED | OUTPATIENT
Start: 2022-12-14 | End: 2022-12-15 | Stop reason: HOSPADM

## 2022-12-14 RX ORDER — LORAZEPAM 2 MG/ML
2 INJECTION INTRAMUSCULAR
Status: COMPLETED | OUTPATIENT
Start: 2022-12-14 | End: 2022-12-14

## 2022-12-14 RX ORDER — POLYETHYLENE GLYCOL 3350 17 G/17G
17 POWDER, FOR SOLUTION ORAL DAILY PRN
Status: DISCONTINUED | OUTPATIENT
Start: 2022-12-14 | End: 2022-12-15 | Stop reason: HOSPADM

## 2022-12-14 RX ORDER — QUETIAPINE FUMARATE 25 MG/1
25 TABLET, FILM COATED ORAL
Status: DISCONTINUED | OUTPATIENT
Start: 2022-12-14 | End: 2022-12-15 | Stop reason: HOSPADM

## 2022-12-14 RX ORDER — VENLAFAXINE HYDROCHLORIDE 75 MG/1
75 CAPSULE, EXTENDED RELEASE ORAL DAILY
Status: DISCONTINUED | OUTPATIENT
Start: 2022-12-15 | End: 2022-12-14

## 2022-12-14 RX ORDER — SODIUM CHLORIDE 0.9 % (FLUSH) 0.9 %
5-40 SYRINGE (ML) INJECTION EVERY 8 HOURS
Status: DISCONTINUED | OUTPATIENT
Start: 2022-12-14 | End: 2022-12-15 | Stop reason: HOSPADM

## 2022-12-14 RX ORDER — VENLAFAXINE HYDROCHLORIDE 75 MG/1
150 CAPSULE, EXTENDED RELEASE ORAL
Status: DISCONTINUED | OUTPATIENT
Start: 2022-12-14 | End: 2022-12-15 | Stop reason: HOSPADM

## 2022-12-14 RX ORDER — VENLAFAXINE HYDROCHLORIDE 75 MG/1
75 CAPSULE, EXTENDED RELEASE ORAL
Status: DISCONTINUED | OUTPATIENT
Start: 2022-12-14 | End: 2022-12-15 | Stop reason: HOSPADM

## 2022-12-14 RX ORDER — CHLORDIAZEPOXIDE HYDROCHLORIDE 10 MG/1
10 CAPSULE, GELATIN COATED ORAL 3 TIMES DAILY
Status: DISCONTINUED | OUTPATIENT
Start: 2022-12-14 | End: 2022-12-15 | Stop reason: HOSPADM

## 2022-12-14 RX ORDER — VENLAFAXINE HYDROCHLORIDE 75 MG/1
150 CAPSULE, EXTENDED RELEASE ORAL DAILY
Status: DISCONTINUED | OUTPATIENT
Start: 2022-12-15 | End: 2022-12-14

## 2022-12-14 RX ORDER — GABAPENTIN 100 MG/1
100 CAPSULE ORAL 2 TIMES DAILY
Status: DISCONTINUED | OUTPATIENT
Start: 2022-12-14 | End: 2022-12-15 | Stop reason: HOSPADM

## 2022-12-14 RX ORDER — SODIUM CHLORIDE 0.9 % (FLUSH) 0.9 %
5-40 SYRINGE (ML) INJECTION AS NEEDED
Status: DISCONTINUED | OUTPATIENT
Start: 2022-12-14 | End: 2022-12-15 | Stop reason: HOSPADM

## 2022-12-14 RX ORDER — ACETAMINOPHEN 325 MG/1
650 TABLET ORAL
Status: DISCONTINUED | OUTPATIENT
Start: 2022-12-14 | End: 2022-12-15 | Stop reason: HOSPADM

## 2022-12-14 RX ORDER — ENOXAPARIN SODIUM 100 MG/ML
40 INJECTION SUBCUTANEOUS DAILY
Status: DISCONTINUED | OUTPATIENT
Start: 2022-12-15 | End: 2022-12-15 | Stop reason: HOSPADM

## 2022-12-14 RX ADMIN — LORAZEPAM 2 MG: 2 INJECTION INTRAMUSCULAR; INTRAVENOUS at 15:49

## 2022-12-14 RX ADMIN — SODIUM CHLORIDE 1000 ML: 9 INJECTION, SOLUTION INTRAVENOUS at 14:44

## 2022-12-14 RX ADMIN — SODIUM CHLORIDE, PRESERVATIVE FREE 10 ML: 5 INJECTION INTRAVENOUS at 22:20

## 2022-12-14 RX ADMIN — MULTIPLE VITAMINS W/ MINERALS TAB 1 TABLET: TAB at 14:40

## 2022-12-14 RX ADMIN — SODIUM CHLORIDE, PRESERVATIVE FREE 10 ML: 5 INJECTION INTRAVENOUS at 16:57

## 2022-12-14 RX ADMIN — THIAMINE HCL TAB 100 MG 100 MG: 100 TAB at 14:40

## 2022-12-14 RX ADMIN — GABAPENTIN 100 MG: 100 CAPSULE ORAL at 21:58

## 2022-12-14 RX ADMIN — LORAZEPAM 2 MG: 2 INJECTION INTRAMUSCULAR; INTRAVENOUS at 14:41

## 2022-12-14 RX ADMIN — CLONIDINE HYDROCHLORIDE 0.1 MG: 0.1 TABLET ORAL at 21:58

## 2022-12-14 RX ADMIN — TRAZODONE HYDROCHLORIDE 100 MG: 50 TABLET ORAL at 21:58

## 2022-12-14 RX ADMIN — VENLAFAXINE HYDROCHLORIDE 75 MG: 75 CAPSULE, EXTENDED RELEASE ORAL at 22:14

## 2022-12-14 RX ADMIN — CHLORDIAZEPOXIDE HYDROCHLORIDE 10 MG: 5 CAPSULE ORAL at 16:48

## 2022-12-14 RX ADMIN — CHLORDIAZEPOXIDE HYDROCHLORIDE 10 MG: 5 CAPSULE ORAL at 21:57

## 2022-12-14 RX ADMIN — THIAMINE HYDROCHLORIDE: 100 INJECTION, SOLUTION INTRAMUSCULAR; INTRAVENOUS at 16:45

## 2022-12-14 RX ADMIN — VENLAFAXINE HYDROCHLORIDE 150 MG: 75 CAPSULE, EXTENDED RELEASE ORAL at 22:14

## 2022-12-14 NOTE — ED TRIAGE NOTES
Patient trying to get into a rehab for alcoholism.  Patients last drink was this morning, usually drinks a liter of vodka a day

## 2022-12-14 NOTE — BSMART NOTE
Resources only provided for detox. Pt states she is here for blood work for IP Substance Abuse, that she has already lined up. Pt denies SI HI Hallucinations.

## 2022-12-14 NOTE — ED PROVIDER NOTES
EMERGENCY DEPARTMENT HISTORY AND PHYSICAL EXAM      Date: 12/14/2022  Patient Name: Sadie Lackey    History of Presenting Illness     Chief Complaint   Patient presents with    Alcohol Problem       History Provided By: Patient    HPI: Sadie Lackey, 24 y.o. female with a past medical history significant No significant past medical history presents to the ED with chief complaint of Alcohol Problem  . 22-year-old female with a history of alcohol abuse. Patient drinks vodka daily. Last drink was yesterday. Wants to get help in an alcohol detox program.  Attempted to go to an outside facility to have lab work done but they refused. Patient is here to have lab work done to clear her to go to a facility where she does have a bed ready. She does she always has a fast heart rate has no symptoms now. Does not feel anxious. No seizure activity. No nausea or vomiting. No suicidal thoughts. There are no other complaints, changes, or physical findings at this time. PCP: Martina Guerrero MD    Current Facility-Administered Medications   Medication Dose Route Frequency Provider Last Rate Last Admin    LORazepam (ATIVAN) injection 2 mg  2 mg IntraVENous NOW CliffCass vigil MD         Current Outpatient Medications   Medication Sig Dispense Refill    chlordiazePOXIDE (LIBRIUM) 25 mg capsule Take 1 Capsule by mouth three (3) times daily as needed for Anxiety. Max Daily Amount: 75 mg. 30 Capsule 0    ondansetron (ZOFRAN ODT) 4 mg disintegrating tablet Take 1 Tablet by mouth every eight (8) hours as needed for Nausea or Vomiting. 15 Tablet 0    spironolactone (ALDACTONE) 50 mg tablet Take 2 Tablets by mouth daily. 30 Tablet 3    nitrofurantoin, macrocrystal-monohydrate, (Macrobid) 100 mg capsule Take 1 Capsule by mouth two (2) times a day. 10 Capsule 0    naltrexone (DEPADE) 50 mg tablet Take 1 Tablet by mouth daily.  30 Tablet 2    diazePAM (Valium) 10 mg tablet Take 0.5 Tablets by mouth every six (6) hours as needed (alcohol withdrawal symptoms). Max Daily Amount: 20 mg. 30 Tablet 0    cholecalciferol (VITAMIN D3) (1000 Units /25 mcg) tablet Take 50 mcg by mouth daily. cloNIDine HCL (CATAPRES) 0.1 mg tablet Take 1 Tablet by mouth two (2) times a day. 180 Tablet 1    pantoprazole (Protonix) 20 mg tablet Take 1 Tablet by mouth daily. 30 Tablet 3    venlafaxine-SR (EFFEXOR-XR) 150 mg capsule Take 1 Capsule by mouth daily. 90 Capsule 3    gabapentin (NEURONTIN) 100 mg capsule take 1 capsule by mouth twice a day 180 Capsule 1    traZODone (DESYREL) 100 mg tablet Take 1 Tablet by mouth nightly. 90 Tablet 1    venlafaxine-SR (EFFEXOR-XR) 75 mg capsule Take 1 Capsule by mouth daily. Total of 225 mg daily in combination with 15o capsule.  90 Capsule 1       Past History     Past Medical History:  Past Medical History:   Diagnosis Date    Anemia     Chronic kidney disease     Chronic pain     Contact dermatitis and eczema due to cause     Depression     Liver disease     Polycystic ovarian syndrome        Past Surgical History:  Past Surgical History:   Procedure Laterality Date    HX ADENOIDECTOMY  2001    HX CHOLECYSTECTOMY  10/2022    HX COLONOSCOPY      HX ENDOSCOPY      HX TONSILLECTOMY  2010    HX WISDOM TEETH EXTRACTION         Family History:  Family History   Problem Relation Age of Onset    Alcohol abuse Father     Diabetes Maternal Grandfather     Other Maternal Grandmother         macular degeneration    Cancer Maternal Grandmother         skin    Depression Mother     Atopy Sister     No Known Problems Brother     No Known Problems Paternal Grandmother     No Known Problems Paternal Grandfather     Heart Disease Neg Hx     Hypertension Neg Hx        Social History:  Social History     Tobacco Use    Smoking status: Every Day     Packs/day: 0.50     Years: 1.00     Pack years: 0.50     Types: Cigarettes     Last attempt to quit: 2021     Years since quittin.9 Smokeless tobacco: Never   Vaping Use    Vaping Use: Every day    Start date: 1/23/2021    Substances: Nicotine    Devices: Disposable   Substance Use Topics    Alcohol use: Not Currently     Comment: intermittently per pt    Drug use: Yes     Types: Marijuana     Comment: daily       Allergies: Allergies   Allergen Reactions    Sulfa (Sulfonamide Antibiotics) Anaphylaxis, Hives and Shortness of Breath         Review of Systems   Review of Systems   Constitutional: Negative. Negative for chills, fatigue and fever. HENT: Negative. Negative for congestion, nosebleeds and sore throat. Eyes: Negative. Negative for pain, discharge and visual disturbance. Respiratory: Negative. Negative for cough, chest tightness and shortness of breath. Cardiovascular:  Negative for chest pain, palpitations and leg swelling. Gastrointestinal:  Negative for abdominal pain, blood in stool, constipation, diarrhea, nausea and vomiting. Endocrine: Negative. Genitourinary: Negative. Negative for difficulty urinating, dysuria, pelvic pain and vaginal bleeding. Musculoskeletal: Negative. Negative for arthralgias, back pain and myalgias. Skin: Negative. Negative for rash and wound. Allergic/Immunologic: Negative. Neurological: Negative. Negative for dizziness, syncope, weakness, numbness and headaches. Hematological: Negative. Psychiatric/Behavioral: Negative. Negative for agitation, confusion and suicidal ideas. All other systems reviewed and are negative. Physical Exam   Patient Vitals for the past 24 hrs:   Temp Pulse Resp BP SpO2   12/14/22 1435 -- (!) 137 27 135/79 97 %   12/14/22 1417 -- (!) 123 19 (!) 139/94 98 %   12/14/22 1405 -- (!) 121 18 (!) 138/90 99 %   12/14/22 1347 -- -- -- -- 98 %   12/14/22 1308 98.2 °F (36.8 °C) (!) 138 16 (!) 150/88 98 %         Physical Exam  Vitals and nursing note reviewed. Exam conducted with a chaperone present.    Constitutional:       Appearance: Normal appearance. She is normal weight. HENT:      Head: Normocephalic and atraumatic. Nose: Nose normal.      Mouth/Throat:      Mouth: Mucous membranes are moist.      Pharynx: Oropharynx is clear. Eyes:      Extraocular Movements: Extraocular movements intact. Conjunctiva/sclera: Conjunctivae normal.      Pupils: Pupils are equal, round, and reactive to light. Cardiovascular:      Rate and Rhythm: Regular rhythm. Tachycardia present. Pulses: Normal pulses. Heart sounds: Normal heart sounds. Pulmonary:      Effort: Pulmonary effort is normal. No respiratory distress. Breath sounds: Normal breath sounds. Abdominal:      General: Abdomen is flat. Bowel sounds are normal. There is no distension. Palpations: Abdomen is soft. Tenderness: There is no abdominal tenderness. There is no guarding. Musculoskeletal:         General: No swelling, tenderness, deformity or signs of injury. Normal range of motion. Cervical back: Normal range of motion and neck supple. Right lower leg: No edema. Left lower leg: No edema. Skin:     General: Skin is warm and dry. Capillary Refill: Capillary refill takes less than 2 seconds. Findings: No lesion or rash. Neurological:      General: No focal deficit present. Mental Status: She is alert and oriented to person, place, and time. Mental status is at baseline. Cranial Nerves: No cranial nerve deficit. Psychiatric:         Mood and Affect: Mood normal.         Behavior: Behavior normal.         Thought Content:  Thought content normal.         Judgment: Judgment normal.       Diagnostic Study Results     Labs -  Recent Results (from the past 24 hour(s))   CBC WITH AUTOMATED DIFF    Collection Time: 12/14/22  2:25 PM   Result Value Ref Range    WBC 10.7 3.6 - 11.0 K/uL    RBC 4.46 3.80 - 5.20 M/uL    HGB 13.1 11.5 - 16.0 g/dL    HCT 40.6 35.0 - 47.0 %    MCV 91.0 80.0 - 99.0 FL    MCH 29.4 26.0 - 34.0 PG    MCHC 32.3 30.0 - 36.5 g/dL    RDW 14.1 11.5 - 14.5 %    PLATELET 876 546 - 641 K/uL    MPV 9.6 8.9 - 12.9 FL    NRBC 0.0 0.0  WBC    ABSOLUTE NRBC 0.00 0.00 - 0.01 K/uL    NEUTROPHILS 75 32 - 75 %    LYMPHOCYTES 17 12 - 49 %    MONOCYTES 6 5 - 13 %    EOSINOPHILS 1 0 - 7 %    BASOPHILS 1 0 - 1 %    IMMATURE GRANULOCYTES 0 0 - 0.5 %    ABS. NEUTROPHILS 8.1 (H) 1.8 - 8.0 K/UL    ABS. LYMPHOCYTES 1.8 0.8 - 3.5 K/UL    ABS. MONOCYTES 0.7 0.0 - 1.0 K/UL    ABS. EOSINOPHILS 0.1 0.0 - 0.4 K/UL    ABS. BASOPHILS 0.1 0.0 - 0.1 K/UL    ABS. IMM. GRANS. 0.0 0.00 - 0.04 K/UL    DF AUTOMATED     METABOLIC PANEL, COMPREHENSIVE    Collection Time: 12/14/22  2:25 PM   Result Value Ref Range    Sodium 141 136 - 145 mmol/L    Potassium 3.6 3.5 - 5.1 mmol/L    Chloride 105 97 - 108 mmol/L    CO2 29 21 - 32 mmol/L    Anion gap 7 5 - 15 mmol/L    Glucose 128 (H) 65 - 100 mg/dL    BUN 7 6 - 20 mg/dL    Creatinine 0.67 0.55 - 1.02 mg/dL    BUN/Creatinine ratio 10 (L) 12 - 20      eGFR >60 >60 ml/min/1.73m2    Calcium 9.4 8.5 - 10.1 mg/dL    Bilirubin, total 1.1 (H) 0.2 - 1.0 mg/dL    AST (SGOT) 103 (H) 15 - 37 U/L    ALT (SGPT) 65 12 - 78 U/L    Alk.  phosphatase 122 (H) 45 - 117 U/L    Protein, total 8.8 (H) 6.4 - 8.2 g/dL    Albumin 4.2 3.5 - 5.0 g/dL    Globulin 4.6 (H) 2.0 - 4.0 g/dL    A-G Ratio 0.9 (L) 1.1 - 2.2     ETHYL ALCOHOL    Collection Time: 12/14/22  2:25 PM   Result Value Ref Range    ALCOHOL(ETHYL),SERUM 330 (HH) <10 mg/dL   HCG QL SERUM    Collection Time: 12/14/22  2:25 PM   Result Value Ref Range    HCG, Ql. Negative Negative     LIPASE    Collection Time: 12/14/22  2:25 PM   Result Value Ref Range    Lipase 156 73 - 393 U/L   AMYLASE    Collection Time: 12/14/22  2:25 PM   Result Value Ref Range    Amylase 66 25 - 115 U/L             Radiologic Studies -   No orders to display     CT Results  (Last 48 hours)      None          CXR Results  (Last 48 hours)      None            Medical Decision Making and ED Course   I am the first provider for this patient. I reviewed the vital signs, available nursing notes, past medical history, past surgical history, family history and social history. Vital Signs-Reviewed the patient's vital signs. Patient Vitals for the past 24 hrs:   Temp Pulse Resp BP SpO2   12/14/22 1435 -- (!) 137 27 135/79 97 %   12/14/22 1417 -- (!) 123 19 (!) 139/94 98 %   12/14/22 1405 -- (!) 121 18 (!) 138/90 99 %   12/14/22 1347 -- -- -- -- 98 %   12/14/22 1308 98.2 °F (36.8 °C) (!) 138 16 (!) 150/88 98 %         EKG interpretation:         Records Reviewed: Previous Hospital chart. EMS run report      ED Course:   Initial assessment performed. The patients presenting problems have been discussed, and they are in agreement with the care plan formulated and outlined with them. I have encouraged them to ask questions as they arise throughout their visit.     Orders Placed This Encounter    CBC WITH AUTOMATED DIFF     Standing Status:   Standing     Number of Occurrences:   1    COMPREHENSIVE METABOLIC PANEL     Standing Status:   Standing     Number of Occurrences:   1    ETHYL ALCOHOL     Standing Status:   Standing     Number of Occurrences:   1    URINALYSIS W/ RFLX MICROSCOPIC     Standing Status:   Standing     Number of Occurrences:   1    HCG QL SERUM     Standing Status:   Standing     Number of Occurrences:   1    DRUG SCREEN, URINE     Standing Status:   Standing     Number of Occurrences:   1    LIPASE     Standing Status:   Standing     Number of Occurrences:   1    AMYLASE     Standing Status:   Standing     Number of Occurrences:   1    FOLATE     Standing Status:   Standing     Number of Occurrences:   1    VITAL SIGNS     Standing Status:   Standing     Number of Occurrences:   1    EKG, 12 LEAD, INITIAL     Standing Status:   Standing     Number of Occurrences:   1     Order Specific Question:   Reason for Exam:     Answer:   wd    SALINE LOCK IV ONE TIME STAT     Standing Status:   Standing Number of Occurrences:   1    sodium chloride 0.9 % bolus infusion 1,000 mL    LORazepam (ATIVAN) injection 2 mg    thiamine mononitrate (B-1) tablet 100 mg    multivitamin, tx-iron-ca-min (THERA-M w/ IRON) tablet 1 Tablet    LORazepam (ATIVAN) injection 2 mg    chlordiazePOXIDE (LIBRIUM) 25 mg capsule     Sig: Take 1 Capsule by mouth three (3) times daily as needed for Anxiety. Max Daily Amount: 75 mg. Dispense:  30 Capsule     Refill:  0    ondansetron (ZOFRAN ODT) 4 mg disintegrating tablet     Sig: Take 1 Tablet by mouth every eight (8) hours as needed for Nausea or Vomiting. Dispense:  15 Tablet     Refill:  0                 Provider Notes (Medical Decision Making):   82-year-old presents with alcohol withdrawal wanting to get treatment. Has an inpatient facility that she is looking at but needs medical clearance. We will treat with fluids. Ativan. Multivitamins. Lab work and submit to the facility. ED Course as of 12/14/22 1536   Wed Dec 14, 2022   1409 EKG at 1402 sinus tachycardia rate of 130. No ST changes. No T wave inversions. Normal intervals. Regional dysrhythmia. Interpreted by ER physician. [HP]   1804 Patient's goal is to be admitted for detox to [facility] however she is still very tachycardic elevated CIWA scale unable to medically clear. Will admit however for alcohol detox as she is in with acute withdrawal. [HP]      ED Course User Index  [HP] Dhara Benitez MD       Consults    [facility] admit    Behavioral health psychiatry evaluated the patient at bedside. Discussed their plan of care with the on-call psychiatrist.            Admitted    Procedures               Disposition       Emergency Department Disposition:  Admitted      Diagnosis     Clinical Impression:   1. Alcohol abuse    2.  Alcohol withdrawal syndrome without complication ([facility] [address]        Attestations:    Prema Shabazz MD    Please note that this dictation was completed with [employer] computer voice recognition software. Quite often unanticipated grammatical, syntax, homophones, and other interpretive errors are inadvertently transcribed by the computer software. Please disregard these errors. Please excuse any errors that have escaped final proofreading. Thank you.

## 2022-12-14 NOTE — PROGRESS NOTES
Reason for Admission:  Alcohol Problem                     RUR Score:  13%                   Plan for utilizing home health:  None @ this time/uses no DME. PCP: First and Last name:  Kiana Macias MD     Name of Practice:    Are you a current patient: Yes/No: Yes   Approximate date of last visit: Seen on 12/5/22. Can you participate in a virtual visit with your PCP: Yes/Call/Has cell phone. Current Advanced Directive/Advance Care Plan: Full Code      Healthcare Decision Maker:              Primary Decision Maker: Abraham Adler - [phone]                  Transition of Care Plan:                    D/C Plan is home & spouse to transport. Send Rxs to [facility] in [place], @ the [facility]. no known allergies

## 2022-12-14 NOTE — H&P
History and Physical    Subjective:   Chief Complaint : alcohol withdrawal since few days  Source of information : patient     History of present illness:     27F, H/o depression and chronic pain with alcohol withdrawal     She states she drinks vodka daily, and last drink was yesterday. She made an attempt to go to outside facility, for etoh detox program but they refused. She finally had a bed ready and was here for lab work and was found to have increased HR and feeling anxious. She denies any suicidal thoughts    Past Medical History:   Diagnosis Date    Anemia     Chronic kidney disease     Chronic pain     Contact dermatitis and eczema due to cause     Depression     Liver disease     Polycystic ovarian syndrome      Past Surgical History:   Procedure Laterality Date    HX ADENOIDECTOMY  2001    HX CHOLECYSTECTOMY  10/2022    HX COLONOSCOPY      HX ENDOSCOPY      HX TONSILLECTOMY  2010    HX WISDOM TEETH EXTRACTION       Family History   Problem Relation Age of Onset    Alcohol abuse Father     Diabetes Maternal Grandfather     Other Maternal Grandmother         macular degeneration    Cancer Maternal Grandmother         skin    Depression Mother     Atopy Sister     No Known Problems Brother     No Known Problems Paternal Grandmother     No Known Problems Paternal Grandfather     Heart Disease Neg Hx     Hypertension Neg Hx       Social History     Tobacco Use    Smoking status: Every Day     Packs/day: 0.50     Years: 1.00     Pack years: 0.50     Types: Cigarettes     Last attempt to quit: 2021     Years since quittin.9    Smokeless tobacco: Never   Substance Use Topics    Alcohol use: Not Currently     Comment: intermittently per pt       Prior to Admission medications    Medication Sig Start Date End Date Taking? Authorizing Provider   chlordiazePOXIDE (LIBRIUM) 25 mg capsule Take 1 Capsule by mouth three (3) times daily as needed for Anxiety. Max Daily Amount: 75 mg. 12/14/22  Yes Jose L Cronin MD   ondansetron (ZOFRAN ODT) 4 mg disintegrating tablet Take 1 Tablet by mouth every eight (8) hours as needed for Nausea or Vomiting. 12/14/22  Yes Jose L Cronin MD   spironolactone (ALDACTONE) 50 mg tablet Take 2 Tablets by mouth daily. 12/5/22   SE Valdez   nitrofurantoin, macrocrystal-monohydrate, (Macrobid) 100 mg capsule Take 1 Capsule by mouth two (2) times a day. 12/5/22   Sanjuana Pena MD   naltrexone (DEPADE) 50 mg tablet Take 1 Tablet by mouth daily. 11/27/22   Sanjuana Pena MD   diazePAM (Valium) 10 mg tablet Take 0.5 Tablets by mouth every six (6) hours as needed (alcohol withdrawal symptoms). Max Daily Amount: 20 mg. 11/20/22   Lydia Najera MD   cholecalciferol (VITAMIN D3) (1000 Units /25 mcg) tablet Take 50 mcg by mouth daily. 9/21/22 12/20/22  Provider, Tunde   cloNIDine HCL (CATAPRES) 0.1 mg tablet Take 1 Tablet by mouth two (2) times a day. 11/11/22   Sanjuana Pena MD   pantoprazole (Protonix) 20 mg tablet Take 1 Tablet by mouth daily. 10/20/22   Vinny Zhu MD   venlafaxine-SR [facility] 150 mg capsule Take 1 Capsule by mouth daily. 10/19/22   Sanjuana Pena MD   gabapentin (NEURONTIN) 100 mg capsule take 1 capsule by mouth twice a day 10/19/22   Sanjuana Pena MD   traZODone (DESYREL) 100 mg tablet Take 1 Tablet by mouth nightly. 8/3/22   Sanjuana Pena MD   venlafaxine-SR [facility] 75 mg capsule Take 1 Capsule by mouth daily. Total of 225 mg daily in combination with 15o capsule. 4/25/22   Sanjuana Pena MD     Allergies   Allergen Reactions    Sulfa (Sulfonamide Antibiotics) Anaphylaxis, Hives and Shortness of Breath             Review of Systems   Constitutional: Negative. Negative for chills, fatigue and fever. HENT: Negative. Negative for congestion, nosebleeds and sore throat. Eyes: Negative. Negative for pain, discharge and visual disturbance. Respiratory: Negative.   Negative for cough, chest tightness and shortness of breath. Cardiovascular:  Negative for chest pain, palpitations and leg swelling. Gastrointestinal:  Negative for abdominal pain, blood in stool, constipation, diarrhea, nausea and vomiting. Endocrine: Negative. Genitourinary: Negative. Negative for difficulty urinating, dysuria, pelvic pain and vaginal bleeding. Musculoskeletal: Negative. Negative for arthralgias, back pain and myalgias. Skin: Negative. Negative for rash and wound. Allergic/Immunologic: Negative. Neurological: Negative. Negative for dizziness, syncope, weakness, numbness and headaches. Hematological: Negative. Psychiatric/Behavioral: Negative. Negative for agitation, confusion and suicidal ideas. All other systems reviewed and are negative. Vitals:   Visit Vitals  /79 (BP 1 Location: Right upper arm, BP Patient Position: At rest;Lying)   Pulse (!) 137   Temp 98.2 °F (36.8 °C)   Resp 27   Ht 5' 7\" (1.702 m)   Wt 63.5 kg (140 lb)   SpO2 97%   BMI 21.93 kg/m²     Physical Exam  Vitals and nursing note reviewed. Exam conducted with a chaperone present. Constitutional:       Appearance: Normal appearance. She is normal weight. HENT:      Head: Normocephalic and atraumatic. Nose: Nose normal.      Mouth/Throat:      Mouth: Mucous membranes are moist.      Pharynx: Oropharynx is clear. Eyes:      Extraocular Movements: Extraocular movements intact. Conjunctiva/sclera: Conjunctivae normal.      Pupils: Pupils are equal, round, and reactive to light. Cardiovascular:      Rate and Rhythm: Regular rhythm. Tachycardia present. Pulses: Normal pulses. Heart sounds: Normal heart sounds. Pulmonary:      Effort: Pulmonary effort is normal. No respiratory distress. Breath sounds: Normal breath sounds. Abdominal:      General: Abdomen is flat. Bowel sounds are normal. There is no distension. Palpations: Abdomen is soft. Tenderness:  There is no abdominal tenderness. There is no guarding. Musculoskeletal:         General: No swelling, tenderness, deformity or signs of injury. Normal range of motion. Cervical back: Normal range of motion and neck supple. Right lower leg: No edema. Left lower leg: No edema. Skin:     General: Skin is warm and dry. Capillary Refill: Capillary refill takes less than 2 seconds. Findings: No lesion or rash. Neurological:      General: No focal deficit present. Mental Status: She is alert and oriented to person, place, and time. Mental status is at baseline. Cranial Nerves: No cranial nerve deficit. Psychiatric:         Mood and Affect: Mood normal.         Behavior: Behavior normal.         Thought Content: Thought content normal.         Judgment: Judgment normal.         Data Review:   Recent Results (from the past 24 hour(s))   CBC WITH AUTOMATED DIFF    Collection Time: 12/14/22  2:25 PM   Result Value Ref Range    WBC 10.7 3.6 - 11.0 K/uL    RBC 4.46 3.80 - 5.20 M/uL    HGB 13.1 11.5 - 16.0 g/dL    HCT 40.6 35.0 - 47.0 %    MCV 91.0 80.0 - 99.0 FL    MCH 29.4 26.0 - 34.0 PG    MCHC 32.3 30.0 - 36.5 g/dL    RDW 14.1 11.5 - 14.5 %    PLATELET 557 173 - 393 K/uL    MPV 9.6 8.9 - 12.9 FL    NRBC 0.0 0.0  WBC    ABSOLUTE NRBC 0.00 0.00 - 0.01 K/uL    NEUTROPHILS 75 32 - 75 %    LYMPHOCYTES 17 12 - 49 %    MONOCYTES 6 5 - 13 %    EOSINOPHILS 1 0 - 7 %    BASOPHILS 1 0 - 1 %    IMMATURE GRANULOCYTES 0 0 - 0.5 %    ABS. NEUTROPHILS 8.1 (H) 1.8 - 8.0 K/UL    ABS. LYMPHOCYTES 1.8 0.8 - 3.5 K/UL    ABS. MONOCYTES 0.7 0.0 - 1.0 K/UL    ABS. EOSINOPHILS 0.1 0.0 - 0.4 K/UL    ABS. BASOPHILS 0.1 0.0 - 0.1 K/UL    ABS. IMM.  GRANS. 0.0 0.00 - 0.04 K/UL    DF AUTOMATED     METABOLIC PANEL, COMPREHENSIVE    Collection Time: 12/14/22  2:25 PM   Result Value Ref Range    Sodium 141 136 - 145 mmol/L    Potassium 3.6 3.5 - 5.1 mmol/L    Chloride 105 97 - 108 mmol/L    CO2 29 21 - 32 mmol/L    Anion gap 7 5 - 15 mmol/L    Glucose 128 (H) 65 - 100 mg/dL    BUN 7 6 - 20 mg/dL    Creatinine 0.67 0.55 - 1.02 mg/dL    BUN/Creatinine ratio 10 (L) 12 - 20      eGFR >60 >60 ml/min/1.73m2    Calcium 9.4 8.5 - 10.1 mg/dL    Bilirubin, total 1.1 (H) 0.2 - 1.0 mg/dL    AST (SGOT) 103 (H) 15 - 37 U/L    ALT (SGPT) 65 12 - 78 U/L    Alk. phosphatase 122 (H) 45 - 117 U/L    Protein, total 8.8 (H) 6.4 - 8.2 g/dL    Albumin 4.2 3.5 - 5.0 g/dL    Globulin 4.6 (H) 2.0 - 4.0 g/dL    A-G Ratio 0.9 (L) 1.1 - 2.2     ETHYL ALCOHOL    Collection Time: 12/14/22  2:25 PM   Result Value Ref Range    ALCOHOL(ETHYL),SERUM 330 (HH) <10 mg/dL   HCG QL SERUM    Collection Time: 12/14/22  2:25 PM   Result Value Ref Range    HCG, Ql. Negative Negative     LIPASE    Collection Time: 12/14/22  2:25 PM   Result Value Ref Range    Lipase 156 73 - 393 U/L   AMYLASE    Collection Time: 12/14/22  2:25 PM   Result Value Ref Range    Amylase 66 25 - 115 U/L             Assessment and Plan :     (1) Alcohol withdrawal: clonidine, librium PRN ativan for CIWA > 10.banana bag    (2) depression and anxiety: resume trazodone and venlafaxine.      DVT ppx: lovenox     DISPO: likely alcohol withdrawal program on discharge likely tomorrow          Signed By: Fredy Ramos MD     December 14, 2022

## 2022-12-15 ENCOUNTER — TELEPHONE (OUTPATIENT)
Dept: FAMILY MEDICINE CLINIC | Age: 27
End: 2022-12-15

## 2022-12-15 VITALS
OXYGEN SATURATION: 99 % | WEIGHT: 140 LBS | RESPIRATION RATE: 18 BRPM | HEIGHT: 67 IN | BODY MASS INDEX: 21.97 KG/M2 | HEART RATE: 96 BPM | DIASTOLIC BLOOD PRESSURE: 73 MMHG | SYSTOLIC BLOOD PRESSURE: 126 MMHG | TEMPERATURE: 98.5 F

## 2022-12-15 PROCEDURE — 74011000250 HC RX REV CODE- 250: Performed by: HOSPITALIST

## 2022-12-15 PROCEDURE — 74011250637 HC RX REV CODE- 250/637: Performed by: HOSPITALIST

## 2022-12-15 PROCEDURE — 74011250636 HC RX REV CODE- 250/636: Performed by: INTERNAL MEDICINE

## 2022-12-15 RX ORDER — SODIUM CHLORIDE 9 MG/ML
75 INJECTION, SOLUTION INTRAVENOUS CONTINUOUS
Status: DISCONTINUED | OUTPATIENT
Start: 2022-12-15 | End: 2022-12-15 | Stop reason: HOSPADM

## 2022-12-15 RX ORDER — LORAZEPAM 2 MG/ML
1 INJECTION INTRAMUSCULAR ONCE
Status: COMPLETED | OUTPATIENT
Start: 2022-12-15 | End: 2022-12-15

## 2022-12-15 RX ADMIN — GABAPENTIN 100 MG: 100 CAPSULE ORAL at 08:36

## 2022-12-15 RX ADMIN — CHLORDIAZEPOXIDE HYDROCHLORIDE 10 MG: 5 CAPSULE ORAL at 08:36

## 2022-12-15 RX ADMIN — PANTOPRAZOLE SODIUM 20 MG: 20 TABLET, DELAYED RELEASE ORAL at 08:36

## 2022-12-15 RX ADMIN — CLONIDINE HYDROCHLORIDE 0.1 MG: 0.1 TABLET ORAL at 08:36

## 2022-12-15 RX ADMIN — SODIUM CHLORIDE 75 ML/HR: 9 INJECTION, SOLUTION INTRAVENOUS at 04:10

## 2022-12-15 RX ADMIN — SODIUM CHLORIDE, PRESERVATIVE FREE 10 ML: 5 INJECTION INTRAVENOUS at 13:15

## 2022-12-15 RX ADMIN — LORAZEPAM 1 MG: 2 INJECTION INTRAMUSCULAR; INTRAVENOUS at 13:12

## 2022-12-15 RX ADMIN — SODIUM CHLORIDE, PRESERVATIVE FREE 10 ML: 5 INJECTION INTRAVENOUS at 05:33

## 2022-12-15 NOTE — PROGRESS NOTES
0829hrs, noted small amount of nasal bleeding with small clots, patient verbalized that it's a long standing issue for her. MD noted and verbally ordered to hold of Enoxaparin for today. 0900hrs Re-assessed bleeding- stopped after 2 minutes as verbalized by patient.

## 2022-12-15 NOTE — TELEPHONE ENCOUNTER
Care Transitions Initial Follow Up Call    Outreach made within 2 business days of discharge: Yes    Patient: Juliet Dancer Patient : 1995   MRN: [id number]  Reason for Admission: Detox  Discharge Date: 12/15/22       Spoke with: Patient    Discharge department/facility: [facility]    TCM Interactive Patient Contact:  Was patient able to fill all prescriptions: Yes  Was patient instructed to bring all medications to the follow-up visit: Yes  Is patient taking all medications as directed in the discharge summary?  Yes  Does patient understand their discharge instructions: Yes  Does patient have questions or concerns that need addressed prior to 7-14 day follow up office visit: No    Scheduled appointment with PCP within 7-14 days    Follow Up  Future Appointments   Date Time Provider Leesa Wiggins   2022  1:30 PM Liliya Kimball MD [facility] BRIAN MOROCHO   2023  4:20 PM [address] Fay Rascon

## 2022-12-15 NOTE — ROUTINE PROCESS
TRANSFER - OUT REPORT:    Verbal report given to Luc (name) on Kenya Gallardo  being transferred to [place] [address] room 571(unit) for routine progression of care       Report consisted of patients Situation, Background, Assessment and   Recommendations(SBAR). Information from the following report(s) SBAR, ED Summary, and MAR was reviewed with the receiving nurse. Opportunity for questions and clarification was provided.       Patient transported with:   Monitor  Tech

## 2022-12-15 NOTE — PROGRESS NOTES
DC order noted. CM met with patient and friend at bedside to discuss DCP. Patient has no needs from CM and friend at bedside will provide transportation. DCP: home, self care. Discharge plan of care/case management plan validated with provider discharge order.

## 2022-12-15 NOTE — PROGRESS NOTES
Physician Progress Note      Stefany Dillon  CSN #:                  [id number]  :                       1995  ADMIT DATE:       2022 1:12 PM  [address] [place] DATE:        12/15/2022 2:47 PM  RESPONDING  PROVIDER #:        Natalia Tillman MD          QUERY TEXT:    Patient admitted with Alcohol Withdrawal.  Noted documentation of depression in  H&P. Please document in progress notes and discharge summary if you are treating/evaluating the following: The medical record reflects the following:  Risk Factors: 24year old female, alcohol abuse/withdrawal, anxious, hx of depression  Clinical Indicators:  H&P - depression and anxiety  Treatment: Trazadone and Venlafaxine continued from home medications      Please email [email or web address] with any questions  Options provided:  -- Major depression, recurrent: mild  -- Major depression, recurrent: moderate  -- Major depression, recurrent:  severe without psychotic features  -- Major depression, recurrent: severe with psychotic features  -- Major depression, single episode: mild  -- Major depression, single episode: moderate  -- Major depression, single episode: severe without psychotic features  -- Major depression, single episode: severe with psychotic features  -- Major depression, single episode: unspecified  -- Other - I will add my own diagnosis  -- Disagree - Not applicable / Not valid  -- Disagree - Clinically unable to determine / Unknown  -- Refer to Clinical Documentation Reviewer    PROVIDER RESPONSE TEXT:    This patent has recurrent major depression, current episode mild. Query created by:  Ramses Kidd on 12/15/2022 11:52 AM      Electronically signed by:  Natalia Tillman MD 12/15/2022 2:58 PM

## 2022-12-15 NOTE — DISCHARGE SUMMARY
Physician Discharge Summary     Patient ID:    Yulisa Leos  [id number]  89 y.o.  1995    Admit date: 12/14/2022    Discharge date : 12/15/2022    Chronic Diagnoses:    Problem List as of 12/15/2022 Date Reviewed: 12/5/2022            Codes Class Noted - Resolved    Alcohol withdrawal ([facility] ICD-10-CM: B83.103  ICD-9-CM: 291.81  12/14/2022 - Present        Calculus of gallbladder without cholecystitis ICD-10-CM: K80.20  ICD-9-CM: 574.20  10/19/2022 - Present    Overview Signed 10/19/2022 12:37 PM by Benita Overton MD     Seen on CT. Splenomegaly ICD-10-CM: R16.1  ICD-9-CM: 789.2  10/19/2022 - Present        Alcoholic liver disease ([facility] ICD-10-CM: K70.9  ICD-9-CM: 640.5  5/13/2022 - Present        Cirrhosis ([facility] ICD-10-CM: K74.60  ICD-9-CM: 571.5  5/13/2022 - Present        Ascites ICD-10-CM: R18.8  ICD-9-CM: 789.59  5/13/2022 - Present        Neuropathy ICD-10-CM: G62.9  ICD-9-CM: 355.9  12/11/2021 - Present        Thrombocytopenia ([facility] ICD-10-CM: D69.6  ICD-9-CM: 287.5  12/11/2021 - Present    Overview Signed 12/11/2021  9:01 PM by Benita Overton MD     Secondary to liver disease             Polycystic ovarian syndrome ICD-10-CM: E28.2  ICD-9-CM: 256.4  Unknown - Present        Depression ICD-10-CM: F32. A  ICD-9-CM: 999  Unknown - Present    Overview Signed 5/29/2019  7:02 PM by Kwaku Dugan     Per records from [facility] 10/03/2018: patient had been on Venlafaxine  for depression. 24    Final Diagnoses:   Alcohol withdrawal ([facility] [F10.939]  Alcohol withdrawal  Depression and anxiety  Dehydration  Tachycardia due to alcohol withdrawal    Reason for Hospitalization:    Elevated heart rate    Hospital Course:   29-year-old lady with history of alcohol abuse admitted due to sinus tachycardia. She was here for routine labs and noted with elevated heart rate. Admitted for overnight IV fluid hydration and close monitoring. Resting comfortably and in no distress. She will be discharged to alcohol rehab center. Stable for discharge. Discharge Medications:   Current Discharge Medication List        START taking these medications    Details   chlordiazePOXIDE (LIBRIUM) 25 mg capsule Take 1 Capsule by mouth three (3) times daily as needed for Anxiety. Max Daily Amount: 75 mg. Qty: 30 Capsule, Refills: 0  Start date: 12/14/2022    Associated Diagnoses: Alcohol withdrawal syndrome without complication (HCC)      ondansetron (ZOFRAN ODT) 4 mg disintegrating tablet Take 1 Tablet by mouth every eight (8) hours as needed for Nausea or Vomiting. Qty: 15 Tablet, Refills: 0  Start date: 12/14/2022           CONTINUE these medications which have NOT CHANGED    Details   spironolactone (ALDACTONE) 50 mg tablet Take 2 Tablets by mouth daily. Qty: 30 Tablet, Refills: 3    Associated Diagnoses: Alcoholic cirrhosis of liver without ascites (HCC)      nitrofurantoin, macrocrystal-monohydrate, (Macrobid) 100 mg capsule Take 1 Capsule by mouth two (2) times a day. Qty: 10 Capsule, Refills: 0    Associated Diagnoses: Urinary tract infection without hematuria, site unspecified      naltrexone (DEPADE) 50 mg tablet Take 1 Tablet by mouth daily. Qty: 30 Tablet, Refills: 2    Associated Diagnoses: Alcohol use disorder, moderate, in early remission (HCC)      diazePAM (Valium) 10 mg tablet Take 0.5 Tablets by mouth every six (6) hours as needed (alcohol withdrawal symptoms). Max Daily Amount: 20 mg.  Qty: 30 Tablet, Refills: 0    Associated Diagnoses: Alcohol dependence with unspecified alcohol-induced disorder (HCC)      cholecalciferol (VITAMIN D3) (1000 Units /25 mcg) tablet Take 50 mcg by mouth daily. cloNIDine HCL (CATAPRES) 0.1 mg tablet Take 1 Tablet by mouth two (2) times a day. Qty: 180 Tablet, Refills: 1    Associated Diagnoses: Anxiety      pantoprazole (Protonix) 20 mg tablet Take 1 Tablet by mouth daily.   Qty: 30 Tablet, Refills: 3      !! venlafaxine-SR (EFFEXOR-XR) 150 mg capsule Take 1 Capsule by mouth daily. Qty: 90 Capsule, Refills: 3    Associated Diagnoses: Depression, unspecified depression type      gabapentin (NEURONTIN) 100 mg capsule take 1 capsule by mouth twice a day  Qty: 180 Capsule, Refills: 1    Associated Diagnoses: Neuropathy      traZODone (DESYREL) 100 mg tablet Take 1 Tablet by mouth nightly. Qty: 90 Tablet, Refills: 1    Associated Diagnoses: Insomnia, unspecified type      !! venlafaxine-SR (EFFEXOR-XR) 75 mg capsule Take 1 Capsule by mouth daily. Total of 225 mg daily in combination with 15o capsule. Qty: 90 Capsule, Refills: 1    Associated Diagnoses: Depression, unspecified depression type       !! - Potential duplicate medications found. Please discuss with provider. Follow up Care:    1. Emmanuel Jaimes MD in 1-2 weeks. Please call to set up an appointment shortly after discharge. Diet:  Regular Diet    Disposition:  Alcohol rehab center. Advanced Directive:   FULL    DNR      Discharge Exam:  Visit Vitals  /73 (BP 1 Location: Left upper arm)   Pulse 66   Temp 98.5 °F (36.9 °C)   Resp 17   Ht 5' 7\" (1.702 m)   Wt 63.5 kg (140 lb)   LMP  (LMP Unknown)   SpO2 99%   BMI 21.93 kg/m²      O2 Device: None (Room air)    Temp (24hrs), Av.2 °F (36.8 °C), Min:98 °F (36.7 °C), Max:98.5 °F (36.9 °C)    No intake/output data recorded. No intake/output data recorded. General:  Alert, cooperative, no distress, appears stated age. Lungs:   Clear to auscultation bilaterally. Chest wall:  No tenderness or deformity. Heart:  Regular rate and rhythm, S1, S2 normal, no murmur, click, rub or gallop. Abdomen:   Soft, non-tender. Bowel sounds normal. No masses,  No organomegaly. Extremities: Extremities normal, atraumatic, no cyanosis or edema. Pulses: 2+ and symmetric all extremities.    Skin: Skin color, texture, turgor normal. No rashes or lesions   Neurologic: CNII-XII intact. No gross sensory or motor deficits         CONSULTATIONS: None    Significant Diagnostic Studies:   12/14/2022: BUN 7 mg/dL (Ref range: 6 - 20 mg/dL); Calcium 9.4 mg/dL (Ref range: 8.5 - 10.1 mg/dL); CO2 29 mmol/L (Ref range: 21 - 32 mmol/L); Creatinine 0.67 mg/dL (Ref range: 0.55 - 1.02 mg/dL); Glucose 128 mg/dL (H; Ref range: 65 - 100 mg/dL); HCT 40.6 % (Ref range: 35.0 - 47.0 %); HGB 13.1 g/dL (Ref range: 11.5 - 16.0 g/dL); Potassium 3.6 mmol/L (Ref range: 3.5 - 5.1 mmol/L); Sodium 141 mmol/L (Ref range: 136 - 145 mmol/L)  Recent Labs     12/14/22  1425   WBC 10.7   HGB 13.1   HCT 40.6        Recent Labs     12/14/22  1425      K 3.6      CO2 29   BUN 7   CREA 0.67   *   CA 9.4   MG 2.0     Recent Labs     12/14/22  1425   ALT 65   *   TBILI 1.1*   TP 8.8*   ALB 4.2   GLOB 4.6*   AML 66   LPSE 156     No results for input(s): INR, PTP, APTT, INREXT in the last 72 hours. No results for input(s): FE, TIBC, PSAT, FERR in the last 72 hours. No results for input(s): PH, PCO2, PO2 in the last 72 hours. No results for input(s): CPK, CKMB in the last 72 hours.     No lab exists for component: TROPONINI  Lab Results   Component Value Date/Time    Glucose (POC) 82 10/20/2022 03:16 PM       Total Time: 30 minutes    Signed:  Mendel Homer, MD  12/15/2022  10:45 AM

## 2022-12-15 NOTE — PROGRESS NOTES
Discharge instructions reviewed with patient and family. Follow-up appointment and medication  was also given to patient and family. PIV removed and patient left the unit via wheelchair.

## 2022-12-23 ENCOUNTER — VIRTUAL VISIT (OUTPATIENT)
Dept: FAMILY MEDICINE CLINIC | Age: 27
End: 2022-12-23
Payer: OTHER GOVERNMENT

## 2022-12-23 DIAGNOSIS — R00.0 TACHYCARDIA: ICD-10-CM

## 2022-12-23 DIAGNOSIS — F10.939 ALCOHOL WITHDRAWAL SYNDROME WITH COMPLICATION (HCC): ICD-10-CM

## 2022-12-23 DIAGNOSIS — Z09 HOSPITAL DISCHARGE FOLLOW-UP: Primary | ICD-10-CM

## 2022-12-23 NOTE — PROGRESS NOTES
Chief Complaint   Patient presents with    Hospital Follow Up     [facility] discharged 12/15/22     1. \"Have you been to the ER, urgent care clinic since your last visit? Hospitalized since your last visit? \" Yes 12/15/2022 [facility] blood work that was required for rehab admission    2. \"Have you seen or consulted any other health care providers outside of the [address] [place] [address] since your last visit? \" No     3. For patients aged 39-70: Has the patient had a colonoscopy / FIT/ Cologuard? NA - based on age      If the patient is female:    4. For patients aged 41-77: Has the patient had a mammogram within the past 2 years? NA - based on age or sex      11. For patients aged 21-65: Has the patient had a pap smear?  No    3 most recent PHQ Screens 12/23/2022   Little interest or pleasure in doing things Not at all   Feeling down, depressed, irritable, or hopeless Several days   Total Score PHQ 2 1   Trouble falling or staying asleep, or sleeping too much -   Feeling tired or having little energy -   Poor appetite, weight loss, or overeating -   Feeling bad about yourself - or that you are a failure or have let yourself or your family down -   Trouble concentrating on things such as school, work, reading, or watching TV -   Moving or speaking so slowly that other people could have noticed; or the opposite being so fidgety that others notice -   Thoughts of being better off dead, or hurting yourself in some way -   PHQ 9 Score -   How difficult have these problems made it for you to do your work, take care of your home and get along with others -     [address] [place] [address] [phone]

## 2022-12-23 NOTE — PROGRESS NOTES
Consent: Eric Ayers, who was seen by synchronous (real-time) audio-video technology, and/or her healthcare decision maker, is aware that this patient-initiated, Telehealth encounter on 12/23/2022 is a billable service, with coverage as determined by her insurance carrier. She is aware that she may receive a bill and has provided verbal consent to proceed: YES-Consent obtained within past 12 months          Subjective:   Eric Ayers is a 24 y.o. female who was seen for Hospital Follow Up [facility] discharged 12/15/22)  Patient was admitted to the hospital from 12/14-12/15 for alcohol withdrawal and tachycardia. She wanted to go to rehab but had to go get evaluated  at the hospital first.  Due to tachycardia she was admitted. She was hydrated while in the hospital.  She was discharged on Librium, and was unable to go to rehab. She has done well she is currently off Librium, has not had any alcohol in 8 days. She does admit to anxiety and stress, as she is currently  from her wife. Current Outpatient Medications on File Prior to Visit   Medication Sig Dispense Refill    ondansetron (ZOFRAN ODT) 4 mg disintegrating tablet Take 1 Tablet by mouth every eight (8) hours as needed for Nausea or Vomiting. 15 Tablet 0    spironolactone (ALDACTONE) 50 mg tablet Take 2 Tablets by mouth daily. 30 Tablet 3    naltrexone (DEPADE) 50 mg tablet Take 1 Tablet by mouth daily. 30 Tablet 2    diazePAM (Valium) 10 mg tablet Take 0.5 Tablets by mouth every six (6) hours as needed (alcohol withdrawal symptoms). Max Daily Amount: 20 mg. 30 Tablet 0    cloNIDine HCL (CATAPRES) 0.1 mg tablet Take 1 Tablet by mouth two (2) times a day. 180 Tablet 1    pantoprazole (Protonix) 20 mg tablet Take 1 Tablet by mouth daily. 30 Tablet 3    venlafaxine-SR (EFFEXOR-XR) 150 mg capsule Take 1 Capsule by mouth daily.  90 Capsule 3    gabapentin (NEURONTIN) 100 mg capsule take 1 capsule by mouth twice a day 180 Capsule 1    traZODone (DESYREL) 100 mg tablet Take 1 Tablet by mouth nightly. 90 Tablet 1    venlafaxine-SR (EFFEXOR-XR) 75 mg capsule Take 1 Capsule by mouth daily. Total of 225 mg daily in combination with 15o capsule. 90 Capsule 1    chlordiazePOXIDE (LIBRIUM) 25 mg capsule Take 1 Capsule by mouth three (3) times daily as needed for Anxiety. Max Daily Amount: 75 mg. (Patient not taking: Reported on 12/23/2022) 30 Capsule 0    nitrofurantoin, macrocrystal-monohydrate, (Macrobid) 100 mg capsule Take 1 Capsule by mouth two (2) times a day. (Patient not taking: Reported on 12/23/2022) 10 Capsule 0     No current facility-administered medications on file prior to visit. Allergies   Allergen Reactions    Sulfa (Sulfonamide Antibiotics) Anaphylaxis, Hives and Shortness of Breath     Patient Active Problem List    Diagnosis    Alcohol withdrawal (HCC)    Calculus of gallbladder without cholecystitis     Seen on CT. 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 Past Medical History:   Diagnosis Date    Anemia     Chronic kidney disease     Chronic pain     Contact dermatitis and eczema due to cause     Depression     Liver disease     Polycystic ovarian syndrome        Review of Systems   All other systems reviewed and are negative. Objective:   Vital Signs: (As obtained by patient/caregiver at home)  There were no vitals taken for this visit.      [INSTRUCTIONS:  \"[x]\" Indicates a positive item  \"[]\" Indicates a negative item  -- DELETE ALL ITEMS NOT EXAMINED]    Constitutional: [x] Appears well-developed and well-nourished [x] No apparent distress      [] Abnormal -     Mental status: [x] Alert and awake  [x] Oriented to person/place/time [x] Able to follow commands [] Abnormal -     Eyes:   EOM    [x]  Normal    [] Abnormal -   Sclera  [x]  Normal    [] Abnormal -          Discharge [x]  None visible   [] Abnormal -     HENT: [x] Normocephalic, atraumatic  [] Abnormal -   [x] Mouth/Throat: Mucous membranes are moist    External Ears [x] Normal  [] Abnormal -    Neck: [x] No visualized mass [] Abnormal -     Pulmonary/Chest: [x] Respiratory effort normal   [x] No visualized signs of difficulty breathing or respiratory distress        [] Abnormal -        Neurological:        [x] No Facial Asymmetry (Cranial nerve 7 motor function) (limited exam due to video visit)          [x] No gaze palsy        [] Abnormal -          Skin:        [x] No significant exanthematous lesions or discoloration noted on facial skin         [] Abnormal -            Psychiatric:       [x] Normal Affect [] Abnormal -        [x] No Hallucinations    Other pertinent observable physical exam findings:-              Assessment & Plan:   Diagnoses and all orders for this visit:    1. Hospital discharge follow-up    2. Alcohol withdrawal syndrome with complication ([facility]    3. Tachycardia          Currently doing well, has not had a drink in a days, is off Librium. Currently  from his wife but states her anxiety is currently well controlled. We discussed the expected course, resolution and complications of the diagnosis(es) in detail. Medication risks, benefits, costs, interactions, and alternatives were discussed as indicated. I advised her to contact the office if her condition worsens, changes or fails to improve as anticipated. She expressed understanding with the diagnosis(es) and plan. Holly Madsen is a 24 y.o. female being evaluated by a video visit encounter for concerns as above. A caregiver was present when appropriate.  Due to this being a TeleHealth encounter, evaluation of the following organ systems was limited: Vitals/Constitutional/EENT/Resp/CV/GI//MS/Neuro/Skin/Heme-Lymph-Imm. Pursuant to the emergency declaration under the [address] [place] [address], [facility] waiver authority and the [employer] Act, this Virtual  Visit was conducted, with patient's (and/or legal guardian's) consent, to reduce the patient's risk of exposure to COVID-19 and provide necessary medical care. Services were provided through a video synchronous discussion virtually to substitute for in-person clinic visit. Patient and provider were located at their individual homes.         Sy Lala MD

## 2022-12-29 DIAGNOSIS — F32.A DEPRESSION, UNSPECIFIED DEPRESSION TYPE: ICD-10-CM

## 2022-12-31 RX ORDER — VENLAFAXINE HYDROCHLORIDE 75 MG/1
75 CAPSULE, EXTENDED RELEASE ORAL DAILY
Qty: 90 CAPSULE | Refills: 1 | Status: SHIPPED | OUTPATIENT
Start: 2022-12-31

## 2023-01-09 ENCOUNTER — OFFICE VISIT (OUTPATIENT)
Dept: FAMILY MEDICINE CLINIC | Age: 28
End: 2023-01-09
Payer: OTHER GOVERNMENT

## 2023-01-09 VITALS
HEIGHT: 67 IN | OXYGEN SATURATION: 98 % | BODY MASS INDEX: 22.35 KG/M2 | TEMPERATURE: 97.7 F | HEART RATE: 113 BPM | SYSTOLIC BLOOD PRESSURE: 118 MMHG | WEIGHT: 142.4 LBS | DIASTOLIC BLOOD PRESSURE: 80 MMHG

## 2023-01-09 DIAGNOSIS — N94.10 DYSPAREUNIA IN FEMALE: ICD-10-CM

## 2023-01-09 DIAGNOSIS — R30.0 DYSURIA: Primary | ICD-10-CM

## 2023-01-09 DIAGNOSIS — R00.0 SINUS TACHYCARDIA: ICD-10-CM

## 2023-01-09 DIAGNOSIS — F33.41 RECURRENT MAJOR DEPRESSIVE DISORDER, IN PARTIAL REMISSION (HCC): ICD-10-CM

## 2023-01-09 DIAGNOSIS — Z11.3 SCREENING EXAMINATION FOR STD (SEXUALLY TRANSMITTED DISEASE): ICD-10-CM

## 2023-01-09 LAB
BILIRUB UR QL STRIP: NEGATIVE
GLUCOSE UR-MCNC: NEGATIVE MG/DL
KETONES P FAST UR STRIP-MCNC: NEGATIVE MG/DL
PH UR STRIP: 7 [PH] (ref 4.6–8)
PROT UR QL STRIP: NEGATIVE
SP GR UR STRIP: 1.02 (ref 1–1.03)
UA UROBILINOGEN AMB POC: NORMAL (ref 0.2–1)
URINALYSIS CLARITY POC: NORMAL
URINALYSIS COLOR POC: YELLOW
URINE BLOOD POC: NEGATIVE
URINE LEUKOCYTES POC: NORMAL
URINE NITRITES POC: NEGATIVE

## 2023-01-09 PROCEDURE — 99214 OFFICE O/P EST MOD 30 MIN: CPT | Performed by: FAMILY MEDICINE

## 2023-01-09 PROCEDURE — 81003 URINALYSIS AUTO W/O SCOPE: CPT | Performed by: FAMILY MEDICINE

## 2023-01-09 NOTE — PROGRESS NOTES
Subjective  Chief Complaint   Patient presents with    Follow-up     STD testing      HPI:  Fadia Jewell is a 24 y.o. female. Started noticing dysuria a few weeks ago. That has lessened but urine is cloudy and smelling different. Just started with some dysparunia in the last few days. No unusual vaginal discharge. No fevers. Regarding the positive depression screening on intake, patient reports that she has a longstanding history of depression and moods are overall stable. She follows with behavioral health center and they have both care and safety plans in place. She does not feel that this needs to be addressed further at today's visit. Objective  Visit Vitals  /80 (BP 1 Location: Left arm, BP Patient Position: Sitting, BP Cuff Size: Adult)   Pulse (!) 113   Temp 97.7 °F (36.5 °C) (Temporal)   Ht 5' 7\" (1.702 m)   Wt 142 lb 6.4 oz (64.6 kg)   SpO2 98%   BMI 22.30 kg/m²     Physical Exam  Constitutional:       Appearance: Normal appearance. She is normal weight. Cardiovascular:      Rate and Rhythm: Regular rhythm. Tachycardia present. Heart sounds: No murmur heard. Pulmonary:      Effort: Pulmonary effort is normal. No respiratory distress. Breath sounds: Normal breath sounds. No wheezing or rales. Abdominal:      General: Abdomen is flat. There is no distension. Palpations: Abdomen is soft. There is no mass. Tenderness: There is no abdominal tenderness. There is no right CVA tenderness, left CVA tenderness, guarding or rebound. Neurological:      Mental Status: She is alert. Gait: Gait normal.   Psychiatric:         Mood and Affect: Mood and affect normal. Mood is not anxious or depressed. Behavior: Behavior normal.        Assessment & Plan      ICD-10-CM ICD-9-CM    1. Dysuria  R30.0 788.1 AMB POC URINALYSIS DIP STICK AUTO W/O MICRO      CULTURE, URINE      2.  Dyspareunia in female  N94.10 625.0 HIV 1/2 AG/AB, 4TH GENERATION,W RFLX CONFIRM      HEPATITIS PANEL, ACUTE      RPR      CHLAMYDIA/GC PCR      3. Screening examination for STD (sexually transmitted disease)  Z11.3 V74.5 HIV 1/2 AG/AB, 4TH GENERATION,W RFLX CONFIRM      HEPATITIS PANEL, ACUTE      RPR      CHLAMYDIA/GC PCR      4. Recurrent major depressive disorder, in partial remission ([facility]  F33.41 296.35       5. Sinus tachycardia  R00.0 427.89         Diagnoses and all orders for this visit:    1. Dysuria  -     AMB POC URINALYSIS DIP STICK AUTO W/O MICRO  -     CULTURE, URINE  Urinalysis in office only positive for small amount of leukocyte esterase. Sending for culture and other STI screening as listed. If all testing comes back within normal limits, we will have her follow-up with her gynecologist.  2. Dyspareunia in female  -     HIV 1/2 AG/AB, 4TH GENERATION,W RFLX CONFIRM  -     HEPATITIS PANEL, ACUTE  -     RPR  -     CHLAMYDIA/GC PCR  See above  3. Screening examination for STD (sexually transmitted disease)  -     HIV 1/2 AG/AB, 4TH GENERATION,W RFLX CONFIRM  -     HEPATITIS PANEL, ACUTE  -     RPR  -     CHLAMYDIA/GC PCR    4. Recurrent major depressive disorder, in partial remission [facility]  Assessment & Plan:  Following with behavioral health center. Care and safety plans in place. Patient reports mood is overall stable. 5. Sinus tachycardia  Assessment & Plan:   Patient reports negative cardiology work-up. No intervention needed at today's visit. Aspects of this note have been generated using voice recognition software. Despite editing, there may be some syntax errors.     Bailey Owusu MD n/a

## 2023-01-09 NOTE — ASSESSMENT & PLAN NOTE
Patient Transferred to: P*P  Handoff Report Given to: Omayra GREENBERG   Patient reports negative cardiology work-up. No intervention needed at today's visit.

## 2023-01-09 NOTE — ASSESSMENT & PLAN NOTE
Following with behavioral health center. Care and safety plans in place. Patient reports mood is overall stable.

## 2023-01-09 NOTE — PROGRESS NOTES
Chief Complaint   Patient presents with    Follow-up     STD testing    Visit Vitals  /80 (BP 1 Location: Left arm, BP Patient Position: Sitting, BP Cuff Size: Adult)   Pulse (!) 113   Temp 97.7 °F (36.5 °C) (Temporal)   Ht 5' 7\" (1.702 m)   Wt 142 lb 6.4 oz (64.6 kg)   SpO2 98%   BMI 22.30 kg/m²       1. \"Have you been to the ER, urgent care clinic since your last visit? Hospitalized since your last visit? \" No    2. \"Have you seen or consulted any other health care providers outside of the [address] [place] [address] since your last visit? \" No     3. For patients aged 39-70: Has the patient had a colonoscopy / FIT/ Cologuard? NA - based on age      If the patient is female:    4. For patients aged 41-77: Has the patient had a mammogram within the past 2 years? NA - based on age or sex      11. For patients aged 21-65: Has the patient had a pap smear?  Yes - no Care Gap present  3 most recent PHQ Screens 1/9/2023   Little interest or pleasure in doing things Nearly every day   Feeling down, depressed, irritable, or hopeless Nearly every day   Total Score PHQ 2 6   Trouble falling or staying asleep, or sleeping too much Not at all   Feeling tired or having little energy Nearly every day   Poor appetite, weight loss, or overeating Not at all   Feeling bad about yourself - or that you are a failure or have let yourself or your family down More than half the days   Trouble concentrating on things such as school, work, reading, or watching TV More than half the days   Moving or speaking so slowly that other people could have noticed; or the opposite being so fidgety that others notice Not at all   Thoughts of being better off dead, or hurting yourself in some way Not at all   PHQ 9 Score 13   How difficult have these problems made it for you to do your work, take care of your home and get along with others Somewhat difficult

## 2023-01-12 LAB
BACTERIA UR CULT: ABNORMAL
C TRACH RRNA SPEC QL NAA+PROBE: NEGATIVE
HAV IGM SERPL QL IA: NEGATIVE
HBV CORE IGM SERPL QL IA: NEGATIVE
HBV SURFACE AG SERPL QL IA: NEGATIVE
HCV AB S/CO SERPL IA: <0.1 S/CO RATIO (ref 0–0.9)
HCV AB SERPL QL IA: NORMAL
HIV 1+2 AB+HIV1 P24 AG SERPL QL IA: NON REACTIVE
N GONORRHOEA RRNA SPEC QL NAA+PROBE: NEGATIVE
RPR SER QL: NON REACTIVE

## 2023-01-13 RX ORDER — NITROFURANTOIN 25; 75 MG/1; MG/1
100 CAPSULE ORAL 2 TIMES DAILY
Qty: 14 CAPSULE | Refills: 0 | Status: SHIPPED | OUTPATIENT
Start: 2023-01-13 | End: 2023-01-20

## 2023-01-16 ENCOUNTER — OFFICE VISIT (OUTPATIENT)
Dept: FAMILY MEDICINE CLINIC | Age: 28
End: 2023-01-16
Payer: OTHER GOVERNMENT

## 2023-01-16 VITALS
DIASTOLIC BLOOD PRESSURE: 58 MMHG | HEART RATE: 86 BPM | WEIGHT: 146 LBS | BODY MASS INDEX: 22.91 KG/M2 | SYSTOLIC BLOOD PRESSURE: 103 MMHG | HEIGHT: 67 IN | TEMPERATURE: 98.1 F

## 2023-01-16 DIAGNOSIS — R00.0 SINUS TACHYCARDIA: ICD-10-CM

## 2023-01-16 DIAGNOSIS — N39.0 URINARY TRACT INFECTION WITHOUT HEMATURIA, SITE UNSPECIFIED: ICD-10-CM

## 2023-01-16 DIAGNOSIS — N39.0 FREQUENT UTI: ICD-10-CM

## 2023-01-16 DIAGNOSIS — G89.29 CHRONIC NONINTRACTABLE HEADACHE, UNSPECIFIED HEADACHE TYPE: Primary | ICD-10-CM

## 2023-01-16 DIAGNOSIS — R51.9 CHRONIC NONINTRACTABLE HEADACHE, UNSPECIFIED HEADACHE TYPE: Primary | ICD-10-CM

## 2023-01-16 PROCEDURE — 99214 OFFICE O/P EST MOD 30 MIN: CPT | Performed by: STUDENT IN AN ORGANIZED HEALTH CARE EDUCATION/TRAINING PROGRAM

## 2023-01-16 RX ORDER — CIPROFLOXACIN 500 MG/1
500 TABLET ORAL 2 TIMES DAILY
Qty: 10 TABLET | Refills: 0 | Status: SHIPPED | OUTPATIENT
Start: 2023-01-16 | End: 2023-01-21

## 2023-01-16 RX ORDER — BUTALBITAL, ACETAMINOPHEN AND CAFFEINE 50; 325; 40 MG/1; MG/1; MG/1
1 TABLET ORAL
Qty: 30 TABLET | Refills: 0 | Status: SHIPPED | OUTPATIENT
Start: 2023-01-16

## 2023-01-16 NOTE — PROGRESS NOTES
Chief Complaint   Patient presents with    Follow-up   Visit Vitals  BP (!) 103/58 (BP 1 Location: Left upper arm, BP Patient Position: Sitting)   Pulse 86   Temp 98.1 °F (36.7 °C) (Axillary)   Ht 5' 7\" (1.702 m)   Wt 146 lb (66.2 kg)   BMI 22.87 kg/m²     1. Have you been to the ER, urgent care clinic since your last visit? Hospitalized since your last visit? No    2. Have you seen or consulted any other health care providers outside of the [address] [place] [address] since your last visit? Include any pap smears or colon screening.  No

## 2023-01-16 NOTE — PROGRESS NOTES
Subjective:     Chief Complaint   Patient presents with    Follow-up     HPI:  Kelechi Garner is a 24 y.o. female with a past medical history of cirrhosis, alcohol use, frequent UTIs who presents today for frequent UTIs, and headache. Patient has had 3 UTIs over the last 2 months. Her most recent UTI is currently being treated with Macrobid. Symptoms are improving. She does admit mild back pain, feeling fatigued. Denies fever. Symptoms with her UTI included pain with intercourse, dysuria. Symptoms are improving with the antibiotic. Urine culture from last week o\ came back positive for E. coli. Patient complains of headache. She takes Tylenol which only helped mildly. Due to her cirrhosis she is limited and was told not to take any NSAIDs. Per patient did not find varices when had endoscopy done. She took Librium today as she was having some shakes, and has helped her headache significantly as well as her tachycardia has improved  Patient Active Problem List    Diagnosis    Sinus tachycardia     Patient reports negative cardiology work-up. Alcohol withdrawal (HCC)    Calculus of gallbladder without cholecystitis     Seen on CT. 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         Past Medical History:   Diagnosis Date    Anemia     Chronic kidney disease     Chronic pain     Contact dermatitis and eczema due to cause     Depression     Liver disease     Polycystic ovarian syndrome      Family History   Problem Relation Age of Onset    Alcohol abuse Father     Diabetes Maternal Grandfather     Other Maternal Grandmother         macular degeneration    Cancer Maternal Grandmother         skin    Depression Mother     Atopy Sister     No Known Problems Brother     No Known Problems Paternal Grandmother     No Known Problems Paternal Grandfather     Heart Disease Neg Hx     Hypertension Neg Hx       reports that she has been smoking cigarettes. She has a 0.50 pack-year smoking history. She has never used smokeless tobacco. She reports that she does not currently use alcohol. She reports current drug use. Drug: Marijuana. Current Outpatient Medications on File Prior to Visit   Medication Sig Dispense Refill    venlafaxine-SR (EFFEXOR-XR) 75 mg capsule Take 1 Capsule by mouth daily. Total of 225 mg daily in combination with 15o capsule. 90 Capsule 1    ondansetron (ZOFRAN ODT) 4 mg disintegrating tablet Take 1 Tablet by mouth every eight (8) hours as needed for Nausea or Vomiting. 15 Tablet 0    spironolactone (ALDACTONE) 50 mg tablet Take 2 Tablets by mouth daily. 30 Tablet 3    naltrexone (DEPADE) 50 mg tablet Take 1 Tablet by mouth daily. 30 Tablet 2    diazePAM (Valium) 10 mg tablet Take 0.5 Tablets by mouth every six (6) hours as needed (alcohol withdrawal symptoms). Max Daily Amount: 20 mg. 30 Tablet 0    cloNIDine HCL (CATAPRES) 0.1 mg tablet Take 1 Tablet by mouth two (2) times a day. 180 Tablet 1    pantoprazole (Protonix) 20 mg tablet Take 1 Tablet by mouth daily. 30 Tablet 3    venlafaxine-SR (EFFEXOR-XR) 150 mg capsule Take 1 Capsule by mouth daily. 90 Capsule 3    gabapentin (NEURONTIN) 100 mg capsule take 1 capsule by mouth twice a day 180 Capsule 1    traZODone (DESYREL) 100 mg tablet Take 1 Tablet by mouth nightly. 90 Tablet 1    [DISCONTINUED] nitrofurantoin, macrocrystal-monohydrate, (Macrobid) 100 mg capsule Take 1 Capsule by mouth two (2) times a day for 7 days. 14 Capsule 0     No current facility-administered medications on file prior to visit.      Allergies   Allergen Reactions    Sulfa (Sulfonamide Antibiotics) Anaphylaxis, Hives and Shortness of Breath Review of Systems   All other systems reviewed and are negative. Objective:     Vitals:    01/16/23 1401   BP: (!) 103/58   Pulse: 86   Temp: 98.1 °F (36.7 °C)   TempSrc: Axillary   Weight: 146 lb (66.2 kg)   Height: 5' 7\" (1.702 m)     Physical Exam  Vitals reviewed. Constitutional:       Appearance: Normal appearance. HENT:      Head: Normocephalic and atraumatic. Cardiovascular:      Rate and Rhythm: Normal rate and regular rhythm. Pulmonary:      Effort: Pulmonary effort is normal.      Breath sounds: Normal breath sounds. Abdominal:      Tenderness: There is right CVA tenderness. There is no left CVA tenderness. Neurological:      Mental Status: She is alert and oriented to person, place, and time. Psychiatric:         Behavior: Behavior normal.          Assessment/Plan:       ICD-10-CM ICD-9-CM    1. Chronic nonintractable headache, unspecified headache type  R51.9 784.0 butalbital-acetaminophen-caffeine (FIORICET, ESGIC) -40 mg per tablet    G89.29        2. Frequent UTI  N39.0 599.0 REFERRAL TO UROLOGY      METABOLIC PANEL, COMPREHENSIVE      CBC WITH AUTOMATED DIFF      3. Urinary tract infection without hematuria, site unspecified  N39.0 599.0 ciprofloxacin HCl (CIPRO) 500 mg tablet      METABOLIC PANEL, COMPREHENSIVE      CBC WITH AUTOMATED DIFF        Diagnoses and all orders for this visit:    1. Chronic nonintractable headache, unspecified headache type  -     butalbital-acetaminophen-caffeine (FIORICET, ESGIC) -40 mg per tablet; Take 1 Tablet by mouth every six (6) hours as needed for Headache. 2. Frequent UTI  -     REFERRAL TO UROLOGY  -     METABOLIC PANEL, COMPREHENSIVE  -     CBC WITH AUTOMATED DIFF    3. Urinary tract infection without hematuria, site unspecified  -     ciprofloxacin HCl (CIPRO) 500 mg tablet;  Take 1 Tablet by mouth two (2) times a day for 5 days.  -     METABOLIC PANEL, COMPREHENSIVE  -     CBC WITH AUTOMATED DIFF    Frequent UTIs-3 UTIs in the last 3 months. Urology consulted    Current UTI-symptoms are improving with Macrobid, but due to overall not on specific complaints, fatigue, and right-sided CVA tenderness will order Cipro to cover for less likely pyelonephritis. Headache-I suspect these are tension headaches. Ordered Fioricet for now. Discussed with her the importance of not taking Tylenol while taking Fioricet she has history of cirrhosis and she is to not overdo it with Tylenol including going over 4000 mg a day. She continues on Effexor or, may need additional help with her anxiety including medication such as BuSpar. History of alcohol abuse-has done a great job abstaining from alcohol for over 1 month. She was congratulated. Continues to use Librium as needed. Continues to follow with Hepatology for cirrhosis. Follow-up and Dispositions    Return in about 4 months (around 5/16/2023), or [facility].           Sarai Castellanos MD

## 2023-01-17 LAB
ALBUMIN SERPL-MCNC: 4.2 G/DL (ref 3.9–5)
ALBUMIN/GLOB SERPL: 1.5 {RATIO} (ref 1.2–2.2)
ALP SERPL-CCNC: 62 IU/L (ref 44–121)
ALT SERPL-CCNC: 21 IU/L (ref 0–32)
AST SERPL-CCNC: 32 IU/L (ref 0–40)
BASOPHILS # BLD AUTO: 0 X10E3/UL (ref 0–0.2)
BASOPHILS NFR BLD AUTO: 1 %
BILIRUB SERPL-MCNC: 0.7 MG/DL (ref 0–1.2)
BUN SERPL-MCNC: 7 MG/DL (ref 6–20)
BUN/CREAT SERPL: 10 (ref 9–23)
CALCIUM SERPL-MCNC: 9.2 MG/DL (ref 8.7–10.2)
CHLORIDE SERPL-SCNC: 101 MMOL/L (ref 96–106)
CO2 SERPL-SCNC: 22 MMOL/L (ref 20–29)
CREAT SERPL-MCNC: 0.68 MG/DL (ref 0.57–1)
EGFR: 124 ML/MIN/1.73
EOSINOPHIL # BLD AUTO: 0.1 X10E3/UL (ref 0–0.4)
EOSINOPHIL NFR BLD AUTO: 2 %
ERYTHROCYTE [DISTWIDTH] IN BLOOD BY AUTOMATED COUNT: 13.1 % (ref 11.7–15.4)
GLOBULIN SER CALC-MCNC: 2.8 G/DL (ref 1.5–4.5)
GLUCOSE SERPL-MCNC: 138 MG/DL (ref 70–99)
HCT VFR BLD AUTO: 35.8 % (ref 34–46.6)
HGB BLD-MCNC: 12.1 G/DL (ref 11.1–15.9)
IMM GRANULOCYTES # BLD AUTO: 0 X10E3/UL (ref 0–0.1)
IMM GRANULOCYTES NFR BLD AUTO: 0 %
LYMPHOCYTES # BLD AUTO: 0.8 X10E3/UL (ref 0.7–3.1)
LYMPHOCYTES NFR BLD AUTO: 20 %
MCH RBC QN AUTO: 29 PG (ref 26.6–33)
MCHC RBC AUTO-ENTMCNC: 33.8 G/DL (ref 31.5–35.7)
MCV RBC AUTO: 86 FL (ref 79–97)
MONOCYTES # BLD AUTO: 0.4 X10E3/UL (ref 0.1–0.9)
MONOCYTES NFR BLD AUTO: 8 %
MORPHOLOGY BLD-IMP: ABNORMAL
NEUTROPHILS # BLD AUTO: 2.9 X10E3/UL (ref 1.4–7)
NEUTROPHILS NFR BLD AUTO: 69 %
PLATELET # BLD AUTO: 91 X10E3/UL (ref 150–450)
POTASSIUM SERPL-SCNC: 4.1 MMOL/L (ref 3.5–5.2)
PROT SERPL-MCNC: 7 G/DL (ref 6–8.5)
RBC # BLD AUTO: 4.17 X10E6/UL (ref 3.77–5.28)
SODIUM SERPL-SCNC: 136 MMOL/L (ref 134–144)
WBC # BLD AUTO: 4.2 X10E3/UL (ref 3.4–10.8)

## 2023-01-19 DIAGNOSIS — G62.9 NEUROPATHY: ICD-10-CM

## 2023-01-21 RX ORDER — GABAPENTIN 100 MG/1
100 CAPSULE ORAL 2 TIMES DAILY
Qty: 180 CAPSULE | Refills: 1 | Status: SHIPPED | OUTPATIENT
Start: 2023-01-21

## 2023-01-22 NOTE — PROGRESS NOTES
our platelet count is low once again.  This is likely due to cirrhosis.  Will need to be monitored.  We have any episodes of abnormal bleeding the needle.     He liver enzymes actually normal now.  With cirrhosis having normal liver enzymes does not always mean your liver is back to normal, it can mean that liver has improved, or can actually mean that the tissue has been damaged and not producing any more enzymes.  You will  needcontinued monitoring from your Hepatologist.  And obviously it is very important to continue abstaining from alcohol. Be careful with medication that can increase your propensity to bleed such as Aleve, aspirin, ibuprofen or Advil.

## 2023-01-28 ENCOUNTER — HOSPITAL ENCOUNTER (EMERGENCY)
Age: 28
Discharge: HOME | End: 2023-01-28
Attending: STUDENT IN AN ORGANIZED HEALTH CARE EDUCATION/TRAINING PROGRAM
Payer: OTHER GOVERNMENT

## 2023-01-28 VITALS
OXYGEN SATURATION: 99 % | RESPIRATION RATE: 17 BRPM | BODY MASS INDEX: 23.3 KG/M2 | HEIGHT: 66 IN | WEIGHT: 145 LBS | SYSTOLIC BLOOD PRESSURE: 97 MMHG | HEART RATE: 74 BPM | TEMPERATURE: 98.5 F | DIASTOLIC BLOOD PRESSURE: 61 MMHG

## 2023-01-28 DIAGNOSIS — R51.9 INTRACTABLE EPISODIC HEADACHE, UNSPECIFIED HEADACHE TYPE: Primary | ICD-10-CM

## 2023-01-28 LAB
ALBUMIN SERPL-MCNC: 3.3 G/DL (ref 3.5–5)
ALBUMIN/GLOB SERPL: 0.9 (ref 1.1–2.2)
ALP SERPL-CCNC: 58 U/L (ref 45–117)
ALT SERPL-CCNC: 28 U/L (ref 12–78)
AMMONIA PLAS-SCNC: 70 UMOL/L
ANION GAP SERPL CALC-SCNC: 4 MMOL/L (ref 5–15)
APPEARANCE UR: CLEAR
AST SERPL W P-5'-P-CCNC: 34 U/L (ref 15–37)
BACTERIA URNS QL MICRO: NEGATIVE /HPF
BACTERIA URNS QL MICRO: NEGATIVE /HPF
BASOPHILS # BLD: 0 K/UL (ref 0–0.1)
BASOPHILS NFR BLD: 0 % (ref 0–1)
BILIRUB DIRECT SERPL-MCNC: 0.2 MG/DL (ref 0–0.2)
BILIRUB SERPL-MCNC: 0.8 MG/DL (ref 0.2–1)
BILIRUB UR QL: NEGATIVE
BUN SERPL-MCNC: 7 MG/DL (ref 6–20)
BUN/CREAT SERPL: 9 (ref 12–20)
CA-I BLD-MCNC: 8.8 MG/DL (ref 8.5–10.1)
CHLORIDE SERPL-SCNC: 108 MMOL/L (ref 97–108)
CO2 SERPL-SCNC: 27 MMOL/L (ref 21–32)
COLOR UR: YELLOW
CREAT SERPL-MCNC: 0.74 MG/DL (ref 0.55–1.02)
DIFFERENTIAL METHOD BLD: ABNORMAL
EOSINOPHIL # BLD: 0.1 K/UL (ref 0–0.4)
EOSINOPHIL NFR BLD: 2 % (ref 0–7)
EPITH CASTS URNS QL MICRO: ABNORMAL /LPF
ERYTHROCYTE [DISTWIDTH] IN BLOOD BY AUTOMATED COUNT: 14 % (ref 11.5–14.5)
GLOBULIN SER CALC-MCNC: 3.6 G/DL (ref 2–4)
GLUCOSE SERPL-MCNC: 156 MG/DL (ref 65–100)
GLUCOSE UR STRIP.AUTO-MCNC: NEGATIVE MG/DL
HCT VFR BLD AUTO: 33.3 % (ref 35–47)
HGB BLD-MCNC: 10.9 G/DL (ref 11.5–16)
HGB UR QL STRIP: ABNORMAL
IMM GRANULOCYTES # BLD AUTO: 0 K/UL (ref 0–0.04)
IMM GRANULOCYTES NFR BLD AUTO: 0 % (ref 0–0.5)
KETONES UR QL STRIP.AUTO: 15 MG/DL
LEUKOCYTE ESTERASE UR QL STRIP.AUTO: NEGATIVE
LIPASE SERPL-CCNC: 115 U/L (ref 73–393)
LYMPHOCYTES # BLD: 0.8 K/UL (ref 0.8–3.5)
LYMPHOCYTES NFR BLD: 20 % (ref 12–49)
MCH RBC QN AUTO: 28.9 PG (ref 26–34)
MCHC RBC AUTO-ENTMCNC: 32.7 G/DL (ref 30–36.5)
MCV RBC AUTO: 88.3 FL (ref 80–99)
MONOCYTES # BLD: 0.3 K/UL (ref 0–1)
MONOCYTES NFR BLD: 8 % (ref 5–13)
NEUTS SEG # BLD: 2.7 K/UL (ref 1.8–8)
NEUTS SEG NFR BLD: 70 % (ref 32–75)
NITRITE UR QL STRIP.AUTO: NEGATIVE
NRBC # BLD: 0 K/UL (ref 0–0.01)
NRBC BLD-RTO: 0 PER 100 WBC
PH UR STRIP: 8 (ref 5–8)
PLATELET # BLD AUTO: 81 K/UL (ref 150–400)
PMV BLD AUTO: 10.1 FL (ref 8.9–12.9)
POTASSIUM SERPL-SCNC: 3.8 MMOL/L (ref 3.5–5.1)
PROT SERPL-MCNC: 6.9 G/DL (ref 6.4–8.2)
PROT UR STRIP-MCNC: NEGATIVE MG/DL
RBC # BLD AUTO: 3.77 M/UL (ref 3.8–5.2)
RBC #/AREA URNS HPF: ABNORMAL /HPF (ref 0–5)
RBC #/AREA URNS HPF: ABNORMAL /HPF (ref 0–5)
SODIUM SERPL-SCNC: 139 MMOL/L (ref 136–145)
SP GR UR REFRACTOMETRY: 1.01 (ref 1–1.03)
UROBILINOGEN UR QL STRIP.AUTO: NEGATIVE EU/DL (ref 0.1–1)
WBC # BLD AUTO: 3.9 K/UL (ref 3.6–11)
WBC URNS QL MICRO: ABNORMAL /HPF (ref 0–4)
WBC URNS QL MICRO: ABNORMAL /HPF (ref 0–4)

## 2023-01-28 PROCEDURE — 80048 BASIC METABOLIC PNL TOTAL CA: CPT

## 2023-01-28 PROCEDURE — 80076 HEPATIC FUNCTION PANEL: CPT

## 2023-01-28 PROCEDURE — 81001 URINALYSIS AUTO W/SCOPE: CPT

## 2023-01-28 PROCEDURE — 36415 COLL VENOUS BLD VENIPUNCTURE: CPT

## 2023-01-28 PROCEDURE — 96374 THER/PROPH/DIAG INJ IV PUSH: CPT

## 2023-01-28 PROCEDURE — 82140 ASSAY OF AMMONIA: CPT

## 2023-01-28 PROCEDURE — 83690 ASSAY OF LIPASE: CPT

## 2023-01-28 PROCEDURE — 85025 COMPLETE CBC W/AUTO DIFF WBC: CPT

## 2023-01-28 PROCEDURE — 99284 EMERGENCY DEPT VISIT MOD MDM: CPT

## 2023-01-28 PROCEDURE — 74011250636 HC RX REV CODE- 250/636

## 2023-01-28 RX ORDER — METOCLOPRAMIDE HYDROCHLORIDE 5 MG/ML
10 INJECTION INTRAMUSCULAR; INTRAVENOUS
Status: COMPLETED | OUTPATIENT
Start: 2023-01-28 | End: 2023-01-28

## 2023-01-28 RX ADMIN — METOCLOPRAMIDE 10 MG: 5 INJECTION, SOLUTION INTRAMUSCULAR; INTRAVENOUS at 15:14

## 2023-01-28 RX ADMIN — SODIUM CHLORIDE 1000 ML: 9 INJECTION, SOLUTION INTRAVENOUS at 14:50

## 2023-01-28 NOTE — DISCHARGE INSTRUCTIONS
You can take ibuprofen 600 mg every 6 hours as needed for your headache, be sure to take this with food. Also important that she stay hydrated. Please return to emergency department if your symptoms worsen, you feel confused, extreme fatigue, chest pain, abdominal pain, vomiting unable to eat or drink, or for any other symptoms that are concerning to you. Sure to keep your appointment with hematology coming up on 2/13, let them know you are seen in the emergency department with a headache and your ammonia was slightly elevated with a result of 70 umol/L     Thank you! Thank you for allowing me to care for you in the emergency department. It is my goal to provide you with excellent care. If you have not received excellent quality care, please ask to speak to the nurse manager. Please fill out the survey that will come to you by mail or email since we listen to your feedback! Below you will find a list of your tests from today's visit. Should you have any questions, please do not hesitate to call the emergency department. Labs  Recent Results (from the past 12 hour(s))   CBC WITH AUTOMATED DIFF    Collection Time: 01/28/23  2:15 PM   Result Value Ref Range    WBC 3.9 3.6 - 11.0 K/uL    RBC 3.77 (L) 3.80 - 5.20 M/uL    HGB 10.9 (L) 11.5 - 16.0 g/dL    HCT 33.3 (L) 35.0 - 47.0 %    MCV 88.3 80.0 - 99.0 FL    MCH 28.9 26.0 - 34.0 PG    MCHC 32.7 30.0 - 36.5 g/dL    RDW 14.0 11.5 - 14.5 %    PLATELET 81 (L) 598 - 400 K/uL    MPV 10.1 8.9 - 12.9 FL    NRBC 0.0 0.0  WBC    ABSOLUTE NRBC 0.00 0.00 - 0.01 K/uL    NEUTROPHILS 70 32 - 75 %    LYMPHOCYTES 20 12 - 49 %    MONOCYTES 8 5 - 13 %    EOSINOPHILS 2 0 - 7 %    BASOPHILS 0 0 - 1 %    IMMATURE GRANULOCYTES 0 0 - 0.5 %    ABS. NEUTROPHILS 2.7 1.8 - 8.0 K/UL    ABS. LYMPHOCYTES 0.8 0.8 - 3.5 K/UL    ABS. MONOCYTES 0.3 0.0 - 1.0 K/UL    ABS. EOSINOPHILS 0.1 0.0 - 0.4 K/UL    ABS. BASOPHILS 0.0 0.0 - 0.1 K/UL    ABS. IMM.  GRANS. 0.0 0.00 - 0.04 K/UL DF AUTOMATED     METABOLIC PANEL, BASIC    Collection Time: 01/28/23  2:15 PM   Result Value Ref Range    Sodium 139 136 - 145 mmol/L    Potassium 3.8 3.5 - 5.1 mmol/L    Chloride 108 97 - 108 mmol/L    CO2 27 21 - 32 mmol/L    Anion gap 4 (L) 5 - 15 mmol/L    Glucose 156 (H) 65 - 100 mg/dL    BUN 7 6 - 20 mg/dL    Creatinine 0.74 0.55 - 1.02 mg/dL    BUN/Creatinine ratio 9 (L) 12 - 20      eGFR >60 >60 ml/min/1.73m2    Calcium 8.8 8.5 - 10.1 mg/dL   HEPATIC FUNCTION PANEL    Collection Time: 01/28/23  2:15 PM   Result Value Ref Range    Protein, total 6.9 6.4 - 8.2 g/dL    Albumin 3.3 (L) 3.5 - 5.0 g/dL    Globulin 3.6 2.0 - 4.0 g/dL    A-G Ratio 0.9 (L) 1.1 - 2.2      Bilirubin, total 0.8 0.2 - 1.0 mg/dL    Bilirubin, direct 0.2 0.0 - 0.2 mg/dL    Alk.  phosphatase 58 45 - 117 U/L    AST (SGOT) 34 15 - 37 U/L    ALT (SGPT) 28 12 - 78 U/L   URINALYSIS W/ RFLX MICROSCOPIC    Collection Time: 01/28/23  2:25 PM   Result Value Ref Range    Color Yellow      Appearance Clear Clear      Specific gravity 1.015 1.003 - 1.030      pH (UA) 8.0 5.0 - 8.0      Protein Negative Negative mg/dL    Glucose Negative Negative mg/dL    Ketone 15 (A) Negative mg/dL    Bilirubin Negative Negative      Blood Trace (A) Negative      Urobilinogen Negative 0.1 - 1.0 EU/dL    Nitrites Negative Negative      Leukocyte Esterase Negative Negative      WBC 5-10 0 - 4 /hpf    RBC 0-5 0 - 5 /hpf    Bacteria Negative Negative /hpf   URINE MICROSCOPIC    Collection Time: 01/28/23  2:25 PM   Result Value Ref Range    WBC 5-10 0 - 4 /hpf    RBC 0-5 0 - 5 /hpf    Epithelial cells Many (A) Few /lpf    Bacteria Negative Negative /hpf   LIPASE    Collection Time: 01/28/23  2:50 PM   Result Value Ref Range    Lipase 115 73 - 393 U/L   AMMONIA    Collection Time: 01/28/23  2:50 PM   Result Value Ref Range    Ammonia, plasma 70 (H) <32 umol/L       Radiologic Studies  No orders to display     CT Results  (Last 48 hours)      None          CXR Results  (Last 48 hours)      None          ------------------------------------------------------------------------------------------------------------  The exam and treatment you received in the Emergency Department were for an urgent problem and are not intended as complete care. It is important that you follow-up with a doctor, nurse practitioner, or physician assistant to:  (1) confirm your diagnosis,  (2) re-evaluation of changes in your illness and treatment, and  (3) for ongoing care. Please take your discharge instructions with you when you go to your follow-up appointment. If you have any problem arranging a follow-up appointment, contact the Emergency Department. If your symptoms become worse or you do not improve as expected and you are unable to reach your health care provider, please return to the Emergency Department. We are available 24 hours a day. If a prescription has been provided, please have it filled as soon as possible to prevent a delay in treatment. If you have any questions or reservations about taking the medication due to side effects or interactions with other medications, please call your primary care provider or contact the ER.

## 2023-01-28 NOTE — ED TRIAGE NOTES
Reports dx with stage 4 liver disease, pt complaining of abd pain and headaches. Reports she is shaking at times.

## 2023-01-29 DIAGNOSIS — F41.9 ANXIETY: ICD-10-CM

## 2023-01-30 RX ORDER — CLONIDINE HYDROCHLORIDE 0.1 MG/1
0.1 TABLET ORAL 2 TIMES DAILY
Qty: 180 TABLET | Refills: 1 | Status: SHIPPED | OUTPATIENT
Start: 2023-01-30

## 2023-02-01 ENCOUNTER — OFFICE VISIT (OUTPATIENT)
Dept: FAMILY MEDICINE CLINIC | Age: 28
End: 2023-02-01
Payer: OTHER GOVERNMENT

## 2023-02-01 VITALS
OXYGEN SATURATION: 99 % | DIASTOLIC BLOOD PRESSURE: 84 MMHG | SYSTOLIC BLOOD PRESSURE: 132 MMHG | TEMPERATURE: 98.4 F | HEART RATE: 106 BPM

## 2023-02-01 DIAGNOSIS — J01.90 ACUTE NON-RECURRENT SINUSITIS, UNSPECIFIED LOCATION: ICD-10-CM

## 2023-02-01 DIAGNOSIS — K70.31 ALCOHOLIC CIRRHOSIS OF LIVER WITH ASCITES (HCC): ICD-10-CM

## 2023-02-01 DIAGNOSIS — J02.9 SORE THROAT: Primary | ICD-10-CM

## 2023-02-01 LAB
QUICKVUE INFLUENZA TEST: NEGATIVE
S PYO AG THROAT QL: NEGATIVE
SARS-COV-2 POC: NEGATIVE
VALID INTERNAL CONTROL?: YES
VALID INTERNAL CONTROL?: YES

## 2023-02-01 NOTE — PROGRESS NOTES
Chief Complaint   Patient presents with    Follow-up     COVID/Flu/Strep Sx   Visit Vitals  /84 (BP 1 Location: Left arm, BP Patient Position: Sitting, BP Cuff Size: Adult)   Pulse (!) 106   Temp 98.4 °F (36.9 °C) (Oral)   SpO2 99%       1. \"Have you been to the ER, urgent care clinic since your last visit? Hospitalized since your last visit? \" Yes When: 1/29/22 Where: [facility] Reason for visit: liver disease Sx    2. \"Have you seen or consulted any other health care providers outside of the [address] [place] [address] since your last visit? \" No     3. For patients aged 39-70: Has the patient had a colonoscopy / FIT/ Cologuard? NA - based on age      If the patient is female:    4. For patients aged 41-77: Has the patient had a mammogram within the past 2 years? NA - based on age or sex      11. For patients aged 21-65: Has the patient had a pap smear?  Yes - no Care Gap present  3 most recent PHQ Screens 2/1/2023   Little interest or pleasure in doing things Nearly every day   Feeling down, depressed, irritable, or hopeless Nearly every day   Total Score PHQ 2 6   Trouble falling or staying asleep, or sleeping too much Nearly every day   Feeling tired or having little energy Nearly every day   Poor appetite, weight loss, or overeating Nearly every day   Feeling bad about yourself - or that you are a failure or have let yourself or your family down Nearly every day   Trouble concentrating on things such as school, work, reading, or watching TV Nearly every day   Moving or speaking so slowly that other people could have noticed; or the opposite being so fidgety that others notice Nearly every day   Thoughts of being better off dead, or hurting yourself in some way Not at all   PHQ 9 Score 24   How difficult have these problems made it for you to do your work, take care of your home and get along with others Somewhat difficult

## 2023-02-01 NOTE — LETTER
NOTIFICATION RETURN TO WORK / SCHOOL    2/1/2023 11:25 AM    Ms. Holly Madsen  [address] [place] [id number]      To Whom It May Concern:    Holly Madsen is currently under the care of [address] [place] [address]. She will return to work/school on: 2/3/23. May return 2/2/23 if no fever over next 24 hours. If there are questions or concerns please have the patient contact our office.         Sincerely,      Stevan Chew MD

## 2023-02-01 NOTE — PROGRESS NOTES
Subjective  Chief Complaint   Patient presents with    Follow-up     COVID/Flu/Strep Sx     HPI:  Merry Jarvis is a 24 y.o. female. Symptoms started 2 days ago with sore throat. From there she developed body aches, fatigue, weakness, and feels like glands are swollen. She is also feeling stuffy with sinus congestion and headache. No fever. No cough. Mild abdominal soreness in sides but no other abd symptoms. No eye symptoms. Taking ibuprofen only. She is wondering if her history of liver diseases is causing her to have contracted illness. Objective  Visit Vitals  /84 (BP 1 Location: Left arm, BP Patient Position: Sitting, BP Cuff Size: Adult)   Pulse (!) 106   Temp 98.4 °F (36.9 °C) (Oral)   SpO2 99%     Physical Exam  Constitutional:       General: She is not in acute distress. Appearance: She is not ill-appearing, toxic-appearing or diaphoretic. HENT:      Head: Normocephalic and atraumatic. Right Ear: Tympanic membrane, ear canal and external ear normal.      Left Ear: Tympanic membrane, ear canal and external ear normal.      Nose: Rhinorrhea present. No congestion. Right Sinus: Maxillary sinus tenderness and frontal sinus tenderness present. Left Sinus: Maxillary sinus tenderness and frontal sinus tenderness present. Mouth/Throat:      Mouth: Mucous membranes are moist.      Pharynx: Posterior oropharyngeal erythema (Posterior pharynx) present. No pharyngeal swelling, oropharyngeal exudate or uvula swelling. Tonsils: No tonsillar exudate or tonsillar abscesses. Eyes:      General: Lids are normal.         Right eye: No discharge. Left eye: No discharge. Conjunctiva/sclera: Conjunctivae normal.      Right eye: Right conjunctiva is not injected. Left eye: Left conjunctiva is not injected. Cardiovascular:      Rate and Rhythm: Normal rate and regular rhythm. Heart sounds: No murmur heard.   Pulmonary:      Effort: Pulmonary effort is normal. No respiratory distress. Breath sounds: Normal breath sounds. No stridor. No wheezing, rhonchi or rales. Musculoskeletal:      Cervical back: Neck supple. No rigidity or tenderness. Lymphadenopathy:      Cervical: Cervical adenopathy present. Skin:     General: Skin is warm and dry. Neurological:      General: No focal deficit present. Mental Status: She is alert. Mental status is at baseline. Psychiatric:         Mood and Affect: Mood normal.         Behavior: Behavior normal.         Thought Content: Thought content normal.         Judgment: Judgment normal.        Assessment & Plan      ICD-10-CM ICD-9-CM    1. Sore throat  J02.9 462 AMB POC SARS-COV-2      AMB POC RAPID STREP A      AMB POC RAPID INFLUENZA TEST      2. Acute non-recurrent sinusitis, unspecified location  J01.90 461.9       3. Alcoholic cirrhosis of liver with ascites (HCC)  K70.31 571.2         Diagnoses and all orders for this visit:    1. Sore throat  -     AMB POC SARS-COV-2  -     AMB POC RAPID STREP A  -     AMB POC RAPID INFLUENZA TEST  Results for orders placed or performed in visit on 02/01/23   AMB POC SARS-COV-2   Result Value Ref Range    SARS-COV-2 POC Negative Negative   AMB POC RAPID STREP A   Result Value Ref Range    VALID INTERNAL CONTROL POC Yes     Group A Strep Ag Negative Negative   AMB POC RAPID INFLUENZA TEST   Result Value Ref Range    VALID INTERNAL CONTROL POC Yes     QuickVue Influenza test Negative Negative       2. Acute non-recurrent sinusitis, unspecified location  We reviewed the viral nature of this diagnosis. Symptoms should improve over the next week. Should that not be the case or if symptoms worsen sooner she understands to call the office. In the meantime I recommend getting plenty of rest and fluids.   Over-the-counter medication list including antihistamines, decongestants, fever reducers/pain relievers (but not acetaminophen), and intranasal steroids along with appropriate recommendations provided. 3. Alcoholic cirrhosis of liver with ascites ([facility] [address]  We reviewed that while her liver disease is not an autoimmune disorder, it can and does affect the immune system. With that being said, her current illness is not outside the parameters of what would be expected for any person with a basic viral upper respiratory syndrome. For now we will treat with routine conservative measures with the exception of eliminating acetaminophen products. Should she have more exacerbated course more frequent recurrences, we will certainly keep her liver disease in mind as a contributing factor. Aspects of this note have been generated using voice recognition software. Despite editing, there may be some syntax errors.     Osvaldo Khoury MD

## 2023-02-01 NOTE — ED PROVIDER NOTES
[facility] EMERGENCY DEPT  EMERGENCY DEPARTMENT HISTORY AND PHYSICAL EXAM      Date: 1/28/2023  Patient Name: Eric Ayers  MRN: [id number]  [place]: 1995  Date of evaluation: 1/28/2023  Provider: Erika Garrett NP   Note Started: 8:58 PM 1/31/23    HISTORY OF PRESENT ILLNESS     Chief Complaint   Patient presents with    Abdominal Pain    Headache       History Provided By: Patient    HPI: Eric Ayers, 24 y.o. 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 Patient states the headache is diffuse and constant and unrelieved by Tylenol. Denies vision changes, vomiting, neck pain, dizziness, extremity weakness, fever, chills, chest pain, difficulty breathing, vomiting, diarrhea, urinary symptoms, sick contacts. Upon arrival to the ED pt is alert and oriented x 3, well-appearing, and interacting appropriately; no obvious distress noted.       PAST MEDICAL HISTORY   Past Medical History:  Past Medical History:   Diagnosis Date    Anemia     Chronic kidney disease     Chronic pain     Contact dermatitis and eczema due to cause     Depression     Liver disease     Polycystic ovarian syndrome        Past Surgical History:  Past Surgical History:   Procedure Laterality Date    HX ADENOIDECTOMY  01/01/2001    HX CHOLECYSTECTOMY  10/2022    HX COLONOSCOPY      HX ENDOSCOPY      HX TONSILLECTOMY  01/01/2010    HX WISDOM TEETH EXTRACTION         Family History:  Family History   Problem Relation Age of Onset    Alcohol abuse Father     Diabetes Maternal Grandfather     Other Maternal Grandmother         macular degeneration    Cancer Maternal Grandmother         skin    Depression Mother     Atopy Sister     No Known Problems Brother     No Known Problems Paternal Grandmother     No Known Problems Paternal Grandfather     Heart Disease Neg Hx     Hypertension Neg Hx        Social History:  Social History     Tobacco Use    Smoking status: Every Day     Packs/day: 0.50     Years: 1.00     Pack years: 0.50     Types: Cigarettes     Last attempt to quit: 2021     Years since quittin.1    Smokeless tobacco: Never   Vaping Use    Vaping Use: Every day    Start date: 2021    Substances: Nicotine    Devices: Disposable   Substance Use Topics    Alcohol use: Not Currently     Comment: intermittently per pt    Drug use: Yes     Types: Marijuana     Comment: daily       Allergies: Allergies   Allergen Reactions    Sulfa (Sulfonamide Antibiotics) Anaphylaxis, Hives and Shortness of Breath       PCP: Aj Wilkerson MD    Current Meds:   Discharge Medication List as of 2023  4:13 PM        CONTINUE these medications which have NOT CHANGED    Details   gabapentin (NEURONTIN) 100 mg capsule Take 1 Capsule by mouth two (2) times a day., Normal, Disp-180 Capsule, R-1      butalbital-acetaminophen-caffeine (FIORICET, ESGIC) -40 mg per tablet Take 1 Tablet by mouth every six (6) hours as needed for Headache., Normal, Disp-30 Tablet, R-0      !! venlafaxine-SR (EFFEXOR-XR) 75 mg capsule Take 1 Capsule by mouth daily. Total of 225 mg daily in combination with 15o capsule., Normal, Disp-90 Capsule, R-1      ondansetron (ZOFRAN ODT) 4 mg disintegrating tablet Take 1 Tablet by mouth every eight (8) hours as needed for Nausea or Vomiting., Normal, Disp-15 Tablet, R-0      spironolactone (ALDACTONE) 50 mg tablet Take 2 Tablets by mouth daily. , Normal, Disp-30 Tablet, R-3      naltrexone (DEPADE) 50 mg tablet Take 1 Tablet by mouth daily. , Normal, Disp-30 Tablet, R-2      diazePAM (Valium) 10 mg tablet Take 0.5 Tablets by mouth every six (6) hours as needed (alcohol withdrawal symptoms).  Max Daily Amount: 20 mg., Normal, Disp-30 Tablet, R-0      cloNIDine HCL (CATAPRES) 0.1 mg tablet Take 1 Tablet by mouth two (2) times a day., Normal, Disp-180 Tablet, R-1      pantoprazole (Protonix) 20 mg tablet Take 1 Tablet by mouth daily. , Normal, Disp-30 Tablet, R-3      !! venlafaxine-SR (EFFEXOR-XR) 150 mg capsule Take 1 Capsule by mouth daily. , Normal, Disp-90 Capsule, R-3      traZODone (DESYREL) 100 mg tablet Take 1 Tablet by mouth nightly., Normal, Disp-90 Tablet, R-1       !! - Potential duplicate medications found. Please discuss with provider. REVIEW OF SYSTEMS   Review of Systems   Constitutional:  Negative for activity change, appetite change, chills, fatigue and fever. Eyes:  Negative for photophobia, pain and visual disturbance. Respiratory:  Negative for cough and shortness of breath. Cardiovascular:  Negative for chest pain. Gastrointestinal:  Positive for abdominal pain. Negative for abdominal distention, blood in stool, constipation, diarrhea, nausea and vomiting. Genitourinary:  Negative for dysuria, flank pain, frequency, hematuria, pelvic pain and vaginal bleeding. Musculoskeletal:  Negative for back pain, myalgias, neck pain and neck stiffness. Skin:  Negative for rash and wound. Neurological:  Positive for headaches. Negative for dizziness, weakness, light-headedness and numbness. Positives and Pertinent negatives as per HPI. PHYSICAL EXAM     ED Triage Vitals [01/28/23 1341]   ED Encounter Vitals Group      /68      Pulse (Heart Rate) (!) 101      Resp Rate 19      Temp 98.2 °F (36.8 °C)      Temp src       O2 Sat (%) 99 %      Weight 145 lb      Height 5' 6\"      Physical Exam  Constitutional:       General: She is not in acute distress. Appearance: She is normal weight. She is not ill-appearing, toxic-appearing or diaphoretic. HENT:      Head: Normocephalic and atraumatic. Cardiovascular:      Rate and Rhythm: Normal rate and regular rhythm. Heart sounds: Normal heart sounds.    Pulmonary:      Effort: Pulmonary effort is normal.      Breath sounds: Normal breath sounds. Abdominal:      General: Abdomen is flat. Bowel sounds are normal. There is no distension. Palpations: Abdomen is soft. There is no mass. Tenderness: There is abdominal tenderness (Diffuse tenderness, no focal area of pain with palpation). There is no right CVA tenderness, left CVA tenderness, guarding or rebound. Hernia: No hernia is present. Musculoskeletal:      Cervical back: Normal range of motion. No rigidity or tenderness. Right lower leg: No edema. Left lower leg: No edema. Skin:     General: Skin is warm and dry. Coloration: Skin is not jaundiced or pale. Findings: No rash. Neurological:      Mental Status: She is alert and oriented to person, place, and time. Psychiatric:         Mood and Affect: Mood normal.         Behavior: Behavior normal.         Thought Content: Thought content normal.         Judgment: Judgment normal.       SCREENINGS               No data recorded        LAB, EKG AND DIAGNOSTIC RESULTS   Labs:  No results found for this or any previous visit (from the past 12 hour(s)). PROCEDURES   Unless otherwise noted below, none.   Performed by: Danitza Briggs NP   Procedures      CRITICAL CARE TIME       ED COURSE and DIFFERENTIAL DIAGNOSIS/MDM   Vitals:    Vitals:    01/28/23 1341 01/28/23 1620   BP: 107/68 97/61   Pulse: (!) 101 74   Resp: 19 17   Temp: 98.2 °F (36.8 °C) 98.5 °F (36.9 °C)   SpO2: 99% 99%   Weight: 65.8 kg (145 lb)    Height: 5' 6\" (1.676 m)         Patient was given the following medications:  Medications   sodium chloride 0.9 % bolus infusion 1,000 mL (1,000 mL IntraVENous New Bag 1/28/23 1450)   metoclopramide HCl (REGLAN) injection 10 mg (10 mg IntraVENous Given 1/28/23 1514)         CONSULTS: (Who and What was discussed)  None     Chronic Conditions: CKD, 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 Disposition Considerations (Tests not done, Shared Decision Making, Pt Expectation of Test or Treatment.):     Presents ambulatory into the emergency department accompanied by a friend with complaints of diffuse abdominal discomfort and headache since last night, but mostly bothered by her HA. Patient states the headache is diffuse and constant and unrelieved by Tylenol. DDx: Gastroenteritis, appendicitis, colitis, IBD, diverticulitis, migraine, tension HA. Will get labs and serial abdominal exams to determine if a CT is warranted. Upon re-examination, the patient has no focal abdominal tenderness to palpation. The patient has a normal WBC and signs and symptoms are consistent with a non-surgical cause of abdominal pain. The patient has been instructed to return to the ER if the abdominal pain has not improved within 24 hours or if they have any further change in condition for a CT scan of the abdomen and pelvis. The diagnosis, test results, medications, return instructions and follow up have been discussed with the patient. The patient has been given the opportunity to ask questions. The patient agrees and expresses understanding of the diagnosis, follow up and return instructions. The patient expresses understanding that although testing today is negative that a surgical issue could still develop and that follow up for a CT scan is essential if the symptoms have not improved in 24 hours. ED Course as of 01/31/23 2114   Sat Jan 28, 2023   1603 Pt states her headache and abdominal pain has completely resolved and would like to be discharged home. Discussed all results, including her elevated ammonia level. Pt states she has an appointment with her Hepatologist on 2/13 and will inform them of these results. Strict return precautions provided to pt and her significant other (at bedside) and everyone verbalized understanding. [LM]      ED Course User Index  [LM] Radha Luu NP     Platelet count noted to be low with a result of 81, however her baseline over the last year appears to be 71-1 59 with an average of about 100. Patient aware of results, hepatology appointment on 2/13. Disposition Consider    FINAL IMPRESSION     1. Intractable episodic headache, unspecified headache type          DISPOSITION/PLAN   Discharged    Discharge Note: The patient is stable for discharge home. The signs, symptoms, diagnosis, and discharge instructions have been discussed, understanding conveyed, and agreed upon. The patient is to follow up as recommended or return to ER should their symptoms worsen. PATIENT REFERRED TO:  Follow-up Information       Follow up With Specialties Details Why Contact Info    Hepatology   Appointment on 2/13 - Inform your provider of your ER visit               DISCHARGE MEDICATIONS:  Discharge Medication List as of 1/28/2023  4:13 PM            DISCONTINUED MEDICATIONS:  Discharge Medication List as of 1/28/2023  4:13 PM          I am the Primary Clinician of Record: Garo Sosa NP (electronically signed)    (Please note that parts of this dictation were completed with voice recognition software. Quite often unanticipated grammatical, syntax, homophones, and other interpretive errors are inadvertently transcribed by the computer software. Please disregards these errors.  Please excuse any errors that have escaped final proofreading.)

## 2023-02-03 DIAGNOSIS — K70.30 ALCOHOLIC CIRRHOSIS OF LIVER WITHOUT ASCITES (HCC): ICD-10-CM

## 2023-02-06 RX ORDER — SPIRONOLACTONE 50 MG/1
100 TABLET, FILM COATED ORAL DAILY
Qty: 30 TABLET | Refills: 3 | Status: SHIPPED | OUTPATIENT
Start: 2023-02-06

## 2023-02-09 ENCOUNTER — PATIENT MESSAGE (OUTPATIENT)
Dept: FAMILY MEDICINE CLINIC | Age: 28
End: 2023-02-09

## 2023-02-10 RX ORDER — AMOXICILLIN AND CLAVULANATE POTASSIUM 875; 125 MG/1; MG/1
1 TABLET, FILM COATED ORAL EVERY 12 HOURS
Qty: 14 TABLET | Refills: 0 | Status: SHIPPED | OUTPATIENT
Start: 2023-02-10 | End: 2023-02-17

## 2023-02-10 NOTE — TELEPHONE ENCOUNTER
From: Mary Post  To: Ritesh Jiang MD  Sent: 2/9/2023 4:53 PM EST  Subject: Elis Solorzano, Im still feeling pretty Viviann [place]. Really stuffed up, headaches every day, sore throat especially in the morning, super fatigued. Im thinking maybe sinus infection? Let me know if you can do anything or if youd like me to come back in. Thank you!

## 2023-02-13 ENCOUNTER — OFFICE VISIT (OUTPATIENT)
Dept: HEMATOLOGY | Age: 28
End: 2023-02-13
Payer: OTHER GOVERNMENT

## 2023-02-13 VITALS
OXYGEN SATURATION: 100 % | DIASTOLIC BLOOD PRESSURE: 51 MMHG | TEMPERATURE: 97.9 F | SYSTOLIC BLOOD PRESSURE: 98 MMHG | HEART RATE: 101 BPM | WEIGHT: 150.6 LBS | HEIGHT: 66 IN | BODY MASS INDEX: 24.2 KG/M2

## 2023-02-13 DIAGNOSIS — K70.30 ALCOHOLIC CIRRHOSIS OF LIVER WITHOUT ASCITES (HCC): Primary | ICD-10-CM

## 2023-02-13 PROCEDURE — 99214 OFFICE O/P EST MOD 30 MIN: CPT | Performed by: PHYSICIAN ASSISTANT

## 2023-02-13 RX ORDER — PANTOPRAZOLE SODIUM 20 MG/1
TABLET, DELAYED RELEASE ORAL
Qty: 90 TABLET | Refills: 3 | Status: SHIPPED | OUTPATIENT
Start: 2023-02-13

## 2023-02-13 RX ORDER — LACTULOSE 10 G/15ML
10 SOLUTION ORAL; RECTAL 2 TIMES DAILY
Qty: 2000 ML | Refills: 3 | Status: SHIPPED | OUTPATIENT
Start: 2023-02-13

## 2023-02-13 NOTE — PROGRESS NOTES
Identified pt with two pt identifiers(name and ). Reviewed record in preparation for visit and have obtained necessary documentation. Chief Complaint   Patient presents with    Cirrhosis Of Liver     4month follow up      Vitals:    23 1602   BP: (!) 98/51   Pulse: (!) 101   Temp: 97.9 °F (36.6 °C)   TempSrc: Temporal   SpO2: 100%   Weight: 150 lb 9.6 oz (68.3 kg)   Height: 5' 6\" (1.676 m)   PainSc:   3       Health Maintenance Review: Patient reminded of \"due or due soon\" health maintenance. I have asked the patient to contact his/her primary care provider (PCP) for follow-up on his/her health maintenance. Coordination of Care Questionnaire:  :   1) Have you been to an emergency room, urgent care, or hospitalized since your last visit? If yes, where when, and reason for visit? Yes. [facility] marker.to      2. Have seen or consulted any other health care provider since your last visit? If yes, where when, and reason for visit? YES. PCP      Patient is accompanied by self I have received verbal consent from Steve Trimble to discuss any/all medical information while they are present in the room.

## 2023-02-13 NOTE — PROGRESS NOTES
[facility], Sin THAO, Ritu Gardner, [place]      Gamal Siu, MADHURI Freedman, ACN-BC     Lima Xiao, AGPCNP-BC   CONSTANTINO JeanP-ORLIN Pulido, [facility]       Maicol Bain [address]    at [address], [facility] Jennifer Harley  22.    [phone]    FAX: [address] [place] [address]    at [address], [address]    [phone]    FAX: [phone]       Patient Care Team:  Skylar Ontiveros MD as PCP - General (Family Medicine)  Skylar Ontiveros MD as PCP - [address] [place] [address] Provider  Susan Munoz MD (Urology)      Problem List  Date Reviewed: 1/16/2023            Codes Class Noted    Sinus tachycardia ICD-10-CM: R00.0  ICD-9-CM: 427.89  1/9/2023    Overview Addendum 1/16/2023  2:31 PM by Skylar Ontiveros MD     Patient reports negative cardiology work-up. Likely associated to anxiety. Alcohol withdrawal ([facility] ICD-10-CM: O85.975  ICD-9-CM: 291.81  12/14/2022        Calculus of gallbladder without cholecystitis ICD-10-CM: K80.20  ICD-9-CM: 574.20  10/19/2022    Overview Signed 10/19/2022 12:37 PM by Skylar Ontiveros MD     Seen on CT. Splenomegaly ICD-10-CM: R16.1  ICD-9-CM: 789.2  27/20/9859        Alcoholic cirrhosis of liver with ascites [facility]) ICD-10-CM: K70.31  ICD-9-CM: 571.2  5/13/2022    Overview Signed 2/1/2023 11:17 AM by Ron Hicks MD     Following at [place] [address] with Gamal Siu, [address]              Neuropathy ICD-10-CM: G62.9  ICD-9-CM: 355.9  12/11/2021        Thrombocytopenia ([facility] ICD-10-CM: D69.6  ICD-9-CM: 287.5  12/11/2021    Overview Signed 12/11/2021  9:01 PM by Skylar Ontiveros MD     Secondary to liver disease             Polycystic ovarian syndrome ICD-10-CM: E28.2  ICD-9-CM: 256.4  Unknown        Recurrent major depressive disorder, in partial remission [facility]) ICD-10-CM: F33.41  ICD-9-CM: 296.35  Unknown    Overview Signed 5/29/2019  7:02 PM by Isabelle Terry     Per records from [facility] 10/03/2018: patient had been on Venlafaxine  for depression. Deirdre Sykes is being seen at [place] [address] for management of cirrhosis secondary to alcoholic liver disease. The active problem list, all pertinent past medical history, medications, radiologic findings and laboratory findings related to the liver disorder were reviewed and discussed with the patient. The patient is a 24 y.o.  female who has consumed alcohol in excess over several years. She was hospitalized with alcoholic hepatitis and ascites in 1/2022. She had been abstinent from alcohol late 6/2022 through late 10/2022 and reports that she had a relapse at the end of the year that last 4-5 weeks. She is now ~2 months without alcohol. She has remained on naltrexone. The patient was found to have cirrhosis in 1/2022 when she developed ascites and was noted to have thrombocytopenia. An assessment of liver fibrosis with Fibroscan was performed in 7/2022 and showed EkPa was 44.8. Suggested fibrosis level is F4. CAP score is 138. Serologic evaluation for markers of chronic liver disease was negative for HCV, HBV, inherited and autoimmune disease. The patient has developed the following complications of cirrhosis: ascites and LE edema. ? New onset HE. The patient has the following symptoms which could be attributed to the liver disorder:    fatigue, intermittent pain in the right and left upper quadrants.     The patient is not experiencing the following symptoms which are commonly seen in this liver disorder:   problems concentrating, swelling of the abdomen has resolved and she is no longer taking diuretics swelling of the lower extremities, hematemesis, or hematochezia. The patient has mild limitations in functional activities which can be attributed to the liver disease     Since the last office visit:  She has severe RUQ pain and was seen at a local EMD and had lap CCY done 10/10/2022. This has not really helped her abdominal pain symptoms much. She has continued to have largely LUQ pain, thought to be related to splenomegaly. She also reports that she had an alcohol relapse in late 10/2022- Dec 2022 and is now ~60+ days sober. She is re-working her steps and meeting with sponsor on a regular basis. She was trying to work a fulltime  job 1/2023 and found that the hours were difficult for her. She was having a hard time with shaking episodes and presented to the ER and was found to ave elevation in ammonia level. She has also had some issues with irritability, brain fog. She generally has 2-3 bowel movements a week ad has remained on naltrexone. She is also noted on the last labs to have had elevation in glucose and a drop in Hgb. She has not had visible signs of blood losses. ASSESSMENT AND PLAN:  Cirrhosis  The diagnosis of cirrhosis is based upon laboratory studies, complications of cirrhosis. Fibroscan shows a score of 45. 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 The CTP is 6. Child class A. The MELD score is 10 (9/2022). This will be rechecked today. I have reviewed her past labs in detail and have shown her that with time, she has had improvement in overall liver synthetic function. She had slip up in Nov and Dec this year and we will reassess values now. She has completed screening examinations for complications of cirrhosis. EGD in 10/2022 showed no varices and mild portal hypertensive gastropathy. Last imaging was CT scan at the time of 10/2022 EMD presentation without liver mass/lesion. This will be repeated with US in 4/2023.      Alcohol liver disease  The diagnosis is based upon a history of consuming significant alcohol on a daily basis for many years, imaging, pattern of AST>ALT, serology that is negative for other causes of chronic liver disease. The histologic severity has not been defined, clinically cirrhotic. .      Liver enzymes are normal.  ALP is normal.  Liver function is normal.  Total bilirubin is elevated. The platelet count is depressed. The patient has been abstinent from alcohol since 12/2022  Discussed the need to remain abstinent from alcohol. She is presently committed to her recovery and is continuing with intensive outpatient work as well. She understands that she has little functional margin and the absolute necessity of maintaining sobriety. Ascites   Ascites developed for the first time in 1/2022. Ascites has resolved with diuretics, she is only on 100 mg spironolactone, taken mostly for acne at this point. The patient was counseled regarding the need to maintain sodium restriction and the types of foods containing high amounts of sodium to be avoided. She has had a hard time controlling intake as she is inpatient recovery and eating prepared meals. Screening for Esophageal varices   The patient has had an EGD to screen for varices, 10/2022. Plan repeat 2025. She denies evidence of GI blood losses. Will assess iron studies given mild anemia. Hepatic encephalopathy   Overt HE has not developed to date. She has had some mild memory and recall issues and has been seen to have elevation in ammonia on recent labs. I have given her a new prescription for lactulose and instructions on how to dose this medication appropriately to induce 1-2 bowel movements daily. She will inform me if this is not helpful for her and if a trial of Xifaxan is indicated. Thrombocytopenia   This is secondary to cirrhosis. There is no evidence of overt bleeding. No treatment is required.   The platelet count is adequate for the patient to undergo procedures without the need for platelet transfusion or platelet growth factors. Anemia   This is due to multifactorial causes including portal hypertension with chronic GI blood loss, bone marrow suppression secondary to malnutrition and alcohol. This has been shown to be resolved on past labs with prolonged sobriety. We will continue to monitor and consider if iron support is needed. Screening for Hepatocellular Carcinoma  [facility] screening was performed in 10/2022 and does not suggest [facility] Will repeat the imaging study every 6 months and will obtain repeat US in 4/2023. Treatment of other medical problems in patients with chronic liver disease  There are no contraindications for the patient to take most medications that are necessary for treatment of other medical issues. The patient has cirrhosis and should avoid taking NSAIDs which are associated with a higher rate of developing JAZZ. The patient consumes alcohol on a daily basis or has recently stopped consuming alcohol. Regular alcohol use increases the risk of toxicity from acetaminophen. This analgesic should be avoided until the patient has been abstinent from alcohol for 6 months. Osteoporosis  The risk of osteoporosis is increased in patients with cirrhosis. DEXA bone density to assess for osteoporosis has not been performed. This should be ordered by the patients primary care physician. Vaccinations   Vaccination for viral hepatitis B is recommended since the patient has no serologic evidence of previous exposure or vaccination with immunity. Vaccination for viral hepatitis A is not needed. The patient has serologic evidence of prior exposure or vaccination with immunity. The patient has received 2 doses of COVID-19 vaccine. Routine vaccinations against other bacterial and viral agents can be performed as indicated. Annual flu vaccination should be administered if indicated.     ALLERGIES  Allergies Allergen Reactions    Sulfa (Sulfonamide Antibiotics) Anaphylaxis, Hives and Shortness of Breath       MEDICATIONS  Current Outpatient Medications   Medication Sig    pantoprazole (PROTONIX) 20 mg tablet take 1 tablet by mouth once daily    amoxicillin-clavulanate (AUGMENTIN) 875-125 mg per tablet Take 1 Tablet by mouth every twelve (12) hours for 7 days. spironolactone (ALDACTONE) 50 mg tablet Take 2 Tablets by mouth daily. cloNIDine HCL (CATAPRES) 0.1 mg tablet Take 1 Tablet by mouth two (2) times a day. (Patient taking differently: Take 0.2 mg by mouth two (2) times a day.)    gabapentin (NEURONTIN) 100 mg capsule Take 1 Capsule by mouth two (2) times a day. butalbital-acetaminophen-caffeine (FIORICET, ESGIC) -40 mg per tablet Take 1 Tablet by mouth every six (6) hours as needed for Headache.    venlafaxine-SR (EFFEXOR-XR) 75 mg capsule Take 1 Capsule by mouth daily. Total of 225 mg daily in combination with 15o capsule. ondansetron (ZOFRAN ODT) 4 mg disintegrating tablet Take 1 Tablet by mouth every eight (8) hours as needed for Nausea or Vomiting. naltrexone (DEPADE) 50 mg tablet Take 1 Tablet by mouth daily. diazePAM (Valium) 10 mg tablet Take 0.5 Tablets by mouth every six (6) hours as needed (alcohol withdrawal symptoms). Max Daily Amount: 20 mg.    venlafaxine-SR (EFFEXOR-XR) 150 mg capsule Take 1 Capsule by mouth daily. traZODone (DESYREL) 100 mg tablet Take 1 Tablet by mouth nightly. No current facility-administered medications for this visit. SYSTEM REVIEW NOT RELATED TO LIVER DISEASE OR REVIEWED ABOVE:  Constitution systems: Negative for fever, chills, weight gain, weight loss. Eyes: Negative for visual changes. ENT: Negative for sore throat, painful swallowing. Respiratory: Negative for cough, hemoptysis, SOB. Cardiology: Negative for chest pain, palpitations. GI:  Negative for constipation or diarrhea. Positive for LUQ pain.    : Negative for urinary frequency, dysuria, hematuria, nocturia. Skin: Negative for rash. Hematology: Negative for easy bruising, blood clots. Musculo-skeletal: Negative for back pain, muscle pain, weakness. Neurologic: Negative for headaches, dizziness, vertigo, memory problems not related to HE. Psychology: Negative for anxiety, depression. FAMILY HISTORY:  The father Has/had the following following chronic disease(s): alcohol abuse. The mother Has/had the following chronic disease(s): None. SOCIAL HISTORY:  The patient is . The patient has no children. The patient has never used tobacco products. The patient uses vapes. The patient has previously consumed alcohol in excess. The patient has been abstinent from alcohol since 12/2022. 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 Presently out of work for recovery. PHYSICAL EXAMINATION:  Visit Vitals  BP (!) 98/51 (BP 1 Location: Right arm, BP Patient Position: Sitting, BP Cuff Size: Adult)   Pulse (!) 101   Temp 97.9 °F (36.6 °C) (Temporal)   Ht 5' 6\" (1.676 m)   Wt 150 lb 9.6 oz (68.3 kg)   SpO2 100%   BMI 24.31 kg/m²     General: No acute distress. Eyes: Sclera anicteric. ENT: No oral lesions. Thyroid normal.  Nodes: No adenopathy. Skin: No spider angiomata. No jaundice. No palmar erythema. Respiratory: Lungs clear to auscultation. Cardiovascular: Regular heart rate. No murmurs. No JVD. Abdomen: Soft non-tender. Liver size normal to percussion/palpation. Spleen not palpable. No obvious ascites. Extremities: No edema. No muscle wasting. No gross arthritic changes. Neurologic: Alert and oriented. Cranial nerves grossly intact. No asterixis.     LABORATORY STUDIES:  Liver [place] of [address] [place] [address] & Units 1/28/2023 1/16/2023   WBC 3.6 - 11.0 K/uL 3.9 4.2   ANC 1.8 - 8.0 K/UL 2.7 2.9   HGB 11.5 - 16.0 g/dL 10.9 (L) 12.1    - 400 K/uL 81 (L) 91 (LL)   INR 0.9 - 1.2     AST 15 - 37 U/L 34 32   ALT 12 - 78 U/L 28 21   Alk Phos 45 - 117 U/L 58 62   Bili, Total 0.2 - 1.0 mg/dL 0.8 0.7   Bili, Direct 0.0 - 0.2 mg/dL 0.2    Albumin 3.5 - 5.0 g/dL 3.3 (L) 4.2   BUN 6 - 20 mg/dL 7 7   Creat 0.55 - 1.02 mg/dL 0.74 0.68   Na 136 - 145 mmol/L 139 136   K 3.5 - 5.1 mmol/L 3.8 4.1   Cl 97 - 108 mmol/L 108 101   CO2 21 - 32 mmol/L 27 22   Glucose 65 - 100 mg/dL 156 (H) 138 (H)   Magnesium 1.6 - 2.4 mg/dL     Ammonia <32 umol/L 70 (H)      Liver [facility] Latest Ref Rng & Units 12/14/2022   WBC 3.6 - 11.0 K/uL 10.7   ANC 1.8 - 8.0 K/UL 8.1 (H)   HGB 11.5 - 16.0 g/dL 13.1    - 400 K/uL 159   INR 0.9 - 1.2    AST 15 - 37 U/L 103 (H)   ALT 12 - 78 U/L 65   Alk Phos 45 - 117 U/L 122 (H)   Bili, Total 0.2 - 1.0 mg/dL 1.1 (H)   Bili, Direct 0.0 - 0.2 mg/dL    Albumin 3.5 - 5.0 g/dL 4.2   BUN 6 - 20 mg/dL 7   Creat 0.55 - 1.02 mg/dL 0.67   Na 136 - 145 mmol/L 141   K 3.5 - 5.1 mmol/L 3.6   Cl 97 - 108 mmol/L 105   CO2 21 - 32 mmol/L 29   Glucose 65 - 100 mg/dL 128 (H)   Magnesium 1.6 - 2.4 mg/dL 2.0   Ammonia <32 umol/L      Cancer Screening Latest Ref Rng & Units 7/28/2022   AFP, Serum 0.0 - 4.7 ng/mL 2.9   AFP-L3% 0.0 - 9.9 % Comment   Additional lab values drawn at today's office visit are pending at the time of documentation. SEROLOGIES:  Serologies Latest Ref Rng & Units 4/21/2022 1/23/2022   Hep A Ab, Total Negative Positive (A)    Hep B Surface Ag Index  <0.10   Hep B Surface Ag Interp Negative  Negative   Hep B Core Ab, Total Negative Negative    Hep B Surface AB QL  Non Reactive    Hep C Ab NONREACTIVE  NONREACTIVE   Ferritin 15 - 150 ng/mL 77    Iron % Saturation 15 - 55 % 54    MANI, IFA  Negative    ASMCA 0 - 19 Units 12    M2 Ab 0.0 - 20.0 Units <20.0    Ceruloplasmin 19.0 - 39.0 mg/dL 22.8    Alpha-1 antitrypsin level 100 - 188 mg/dL 217 (H)      LIVER HISTOLOGY:  7/2022. [facility]. EkPa was 44.8. Suggested fibrosis level is F4.  CAP score is 138.    ENDOSCOPIC PROCEDURES:  10/2022. EGD performed by Dr Florentino Reynoso. No esophageal varices. No gastric varices. Mild portal gastropathy. RADIOLOGY:  1/2022. Ultrasound of liver. Echogenic consistent with fatty liver. No liver mass lesions. No dilated bile ducts. No ascites. 10/2022. CT abdomen with IV contrast.  No liver mass/lesion, fatty infiltration. Splenomegaly identified. OTHER TESTING:  Not available or performed    FOLLOW-UP:  All of the issues listed above in the Assessment and Plan were discussed with the patient. All questions were answered. The patient expressed a clear understanding of the above. [facility] 87 in 2-3 months for office visit to monitor response to lactulose and her ongoing sobriety. Will plan on repeat US at that time.        Joan Andrews PA-C  Liver [facility], [address] [facility] Jennifer Harrison  22.  [phone]  [address] [place] [address]

## 2023-02-14 LAB
ALBUMIN SERPL-MCNC: 4.3 G/DL (ref 3.9–5)
ALP SERPL-CCNC: 69 IU/L (ref 44–121)
ALT SERPL-CCNC: 19 IU/L (ref 0–32)
AMMONIA PLAS-MCNC: 110 UG/DL (ref 29–112)
AST SERPL-CCNC: 35 IU/L (ref 0–40)
BILIRUB DIRECT SERPL-MCNC: 0.23 MG/DL (ref 0–0.4)
BILIRUB SERPL-MCNC: 0.6 MG/DL (ref 0–1.2)
BUN SERPL-MCNC: 9 MG/DL (ref 6–20)
BUN/CREAT SERPL: 14 (ref 9–23)
CALCIUM SERPL-MCNC: 9.1 MG/DL (ref 8.7–10.2)
CHLORIDE SERPL-SCNC: 102 MMOL/L (ref 96–106)
CO2 SERPL-SCNC: 24 MMOL/L (ref 20–29)
CREAT SERPL-MCNC: 0.64 MG/DL (ref 0.57–1)
EGFRCR SERPLBLD CKD-EPI 2021: 126 ML/MIN/1.73
ERYTHROCYTE [DISTWIDTH] IN BLOOD BY AUTOMATED COUNT: 14.5 % (ref 11.7–15.4)
EST. AVERAGE GLUCOSE BLD GHB EST-MCNC: 91 MG/DL
FERRITIN SERPL-MCNC: 36 NG/ML (ref 15–150)
GLUCOSE SERPL-MCNC: 71 MG/DL (ref 70–99)
HBA1C MFR BLD: 4.8 % (ref 4.8–5.6)
HCT VFR BLD AUTO: 34.5 % (ref 34–46.6)
HGB BLD-MCNC: 11.7 G/DL (ref 11.1–15.9)
INR PPP: 1.2 (ref 0.9–1.2)
IRON SATN MFR SERPL: 34 % (ref 15–55)
IRON SERPL-MCNC: 149 UG/DL (ref 27–159)
MCH RBC QN AUTO: 28.8 PG (ref 26.6–33)
MCHC RBC AUTO-ENTMCNC: 33.9 G/DL (ref 31.5–35.7)
MCV RBC AUTO: 85 FL (ref 79–97)
MORPHOLOGY BLD-IMP: ABNORMAL
PLATELET # BLD AUTO: 90 X10E3/UL (ref 150–450)
POTASSIUM SERPL-SCNC: 3.6 MMOL/L (ref 3.5–5.2)
PROT SERPL-MCNC: 7 G/DL (ref 6–8.5)
PROTHROMBIN TIME: 12.6 SEC (ref 9.1–12)
RBC # BLD AUTO: 4.06 X10E6/UL (ref 3.77–5.28)
SODIUM SERPL-SCNC: 138 MMOL/L (ref 134–144)
TIBC SERPL-MCNC: 439 UG/DL (ref 250–450)
UIBC SERPL-MCNC: 290 UG/DL (ref 131–425)
WBC # BLD AUTO: 4.5 X10E3/UL (ref 3.4–10.8)

## 2023-02-15 LAB
AFP L3 MFR SERPL: NORMAL % (ref 0–9.9)
AFP SERPL-MCNC: 2.5 NG/ML (ref 0–4.7)

## 2023-02-16 NOTE — PROGRESS NOTES
Pt notified of stable enzymes/function. Will proceed with restart of lactulose as discussed and she will notify me if any issues.

## 2023-02-22 ENCOUNTER — PATIENT MESSAGE (OUTPATIENT)
Dept: FAMILY MEDICINE CLINIC | Age: 28
End: 2023-02-22

## 2023-02-22 DIAGNOSIS — F41.9 ANXIETY: ICD-10-CM

## 2023-02-22 DIAGNOSIS — F10.930 ALCOHOL WITHDRAWAL SYNDROME WITHOUT COMPLICATION (HCC): Primary | ICD-10-CM

## 2023-02-22 RX ORDER — CHLORDIAZEPOXIDE HYDROCHLORIDE 25 MG/1
25 CAPSULE, GELATIN COATED ORAL
Qty: 30 CAPSULE | Refills: 0 | Status: SHIPPED | OUTPATIENT
Start: 2023-02-22

## 2023-02-25 DIAGNOSIS — F10.21 ALCOHOL USE DISORDER, MODERATE, IN EARLY REMISSION (HCC): ICD-10-CM

## 2023-02-27 RX ORDER — NALTREXONE HYDROCHLORIDE 50 MG/1
50 TABLET, FILM COATED ORAL DAILY
Qty: 30 TABLET | Refills: 2 | Status: SHIPPED | OUTPATIENT
Start: 2023-02-27

## 2023-03-01 ENCOUNTER — CLINICAL SUPPORT (OUTPATIENT)
Dept: FAMILY MEDICINE CLINIC | Age: 28
End: 2023-03-01

## 2023-03-01 DIAGNOSIS — Z02.1 ENCOUNTER FOR PRE-EMPLOYMENT EXAMINATION: Primary | ICD-10-CM

## 2023-03-01 NOTE — PROGRESS NOTES
PPD Placement note  Solange Yeh, 24 y.o. female is here today for placement of PPD test  Reason for PPD test: New employer  Pt taken PPD test before: yes  Verified in allergy area and with patient that they are not allergic to the products PPD is made of (Phenol or Tween). Yes  Is patient taking any oral or IV steroid medication now or have they taken it in the last month? no  Has the patient ever received the BCG vaccine?: no  Has the patient been in recent contact with anyone known or suspected of having active TB disease?: no       Date of exposure (if applicable):        Name of person they were exposed to (if applicable):   Patient's Country of origin?:[place]  O: Alert and oriented in NAD. P:  PPD placed on 3/1/2023. Patient advised to return for reading within 48-72 hours.

## 2023-03-03 LAB
MM INDURATION POC: 0 MM (ref 0–5)
PPD POC: NEGATIVE NEGATIVE

## 2023-03-06 ENCOUNTER — HOSPITAL ENCOUNTER (INPATIENT)
Age: 28
LOS: 4 days | Discharge: NURSING, REHAB OR LONG-TERM CARE | End: 2023-03-10
Attending: STUDENT IN AN ORGANIZED HEALTH CARE EDUCATION/TRAINING PROGRAM | Admitting: PSYCHIATRY & NEUROLOGY
Payer: OTHER GOVERNMENT

## 2023-03-06 DIAGNOSIS — R45.851 SUICIDAL IDEATION: Primary | ICD-10-CM

## 2023-03-06 DIAGNOSIS — F10.920 ALCOHOLIC INTOXICATION WITHOUT COMPLICATION (HCC): ICD-10-CM

## 2023-03-06 DIAGNOSIS — F32.A DEPRESSION, UNSPECIFIED DEPRESSION TYPE: ICD-10-CM

## 2023-03-06 LAB
ALBUMIN SERPL-MCNC: 3.8 G/DL (ref 3.5–5)
ALBUMIN/GLOB SERPL: 1 (ref 1.1–2.2)
ALP SERPL-CCNC: 89 U/L (ref 45–117)
ALT SERPL-CCNC: 30 U/L (ref 12–78)
AMPHET UR QL SCN: NEGATIVE
ANION GAP SERPL CALC-SCNC: 5 MMOL/L (ref 5–15)
APPEARANCE UR: ABNORMAL
AST SERPL W P-5'-P-CCNC: 38 U/L (ref 15–37)
BACTERIA URNS QL MICRO: ABNORMAL /HPF
BARBITURATES UR QL SCN: NEGATIVE
BASOPHILS # BLD: 0.1 K/UL (ref 0–0.1)
BASOPHILS NFR BLD: 1 % (ref 0–1)
BENZODIAZ UR QL: NEGATIVE
BILIRUB SERPL-MCNC: 0.8 MG/DL (ref 0.2–1)
BILIRUB UR QL: NEGATIVE
BUN SERPL-MCNC: 6 MG/DL (ref 6–20)
BUN/CREAT SERPL: 9 (ref 12–20)
CA-I BLD-MCNC: 8.8 MG/DL (ref 8.5–10.1)
CANNABINOIDS UR QL SCN: NEGATIVE
CHLORIDE SERPL-SCNC: 109 MMOL/L (ref 97–108)
CO2 SERPL-SCNC: 26 MMOL/L (ref 21–32)
COCAINE UR QL SCN: NEGATIVE
COLOR UR: ABNORMAL
CREAT SERPL-MCNC: 0.69 MG/DL (ref 0.55–1.02)
DIFFERENTIAL METHOD BLD: ABNORMAL
DRUG SCRN COMMENT,DRGCM: NORMAL
EOSINOPHIL # BLD: 0.1 K/UL (ref 0–0.4)
EOSINOPHIL NFR BLD: 1 % (ref 0–7)
EPITH CASTS URNS QL MICRO: ABNORMAL /LPF
ERYTHROCYTE [DISTWIDTH] IN BLOOD BY AUTOMATED COUNT: 14.8 % (ref 11.5–14.5)
ETHANOL SERPL-MCNC: 195 MG/DL
ETHANOL SERPL-MCNC: 283 MG/DL
FLUAV RNA SPEC QL NAA+PROBE: NOT DETECTED
FLUBV RNA SPEC QL NAA+PROBE: NOT DETECTED
GLOBULIN SER CALC-MCNC: 3.8 G/DL (ref 2–4)
GLUCOSE SERPL-MCNC: 167 MG/DL (ref 65–100)
GLUCOSE UR STRIP.AUTO-MCNC: NEGATIVE MG/DL
HCG UR QL: NEGATIVE
HCT VFR BLD AUTO: 39.1 % (ref 35–47)
HGB BLD-MCNC: 13.1 G/DL (ref 11.5–16)
HGB UR QL STRIP: ABNORMAL
IMM GRANULOCYTES # BLD AUTO: 0 K/UL (ref 0–0.04)
IMM GRANULOCYTES NFR BLD AUTO: 0 % (ref 0–0.5)
KETONES UR QL STRIP.AUTO: NEGATIVE MG/DL
LEUKOCYTE ESTERASE UR QL STRIP.AUTO: ABNORMAL
LYMPHOCYTES # BLD: 3.1 K/UL (ref 0.8–3.5)
LYMPHOCYTES NFR BLD: 31 % (ref 12–49)
MCH RBC QN AUTO: 28.7 PG (ref 26–34)
MCHC RBC AUTO-ENTMCNC: 33.5 G/DL (ref 30–36.5)
MCV RBC AUTO: 85.7 FL (ref 80–99)
METHADONE UR QL: NEGATIVE
MONOCYTES # BLD: 0.7 K/UL (ref 0–1)
MONOCYTES NFR BLD: 7 % (ref 5–13)
MUCOUS THREADS URNS QL MICRO: ABNORMAL /LPF
NEUTS SEG # BLD: 6.1 K/UL (ref 1.8–8)
NEUTS SEG NFR BLD: 60 % (ref 32–75)
NITRITE UR QL STRIP.AUTO: NEGATIVE
NRBC # BLD: 0 K/UL (ref 0–0.01)
NRBC BLD-RTO: 0 PER 100 WBC
OPIATES UR QL: NEGATIVE
PCP UR QL: NEGATIVE
PH UR STRIP: 6 (ref 5–8)
PLATELET # BLD AUTO: 120 K/UL (ref 150–400)
PMV BLD AUTO: 9.6 FL (ref 8.9–12.9)
POTASSIUM SERPL-SCNC: 3.4 MMOL/L (ref 3.5–5.1)
PROT SERPL-MCNC: 7.6 G/DL (ref 6.4–8.2)
PROT UR STRIP-MCNC: NEGATIVE MG/DL
RBC # BLD AUTO: 4.56 M/UL (ref 3.8–5.2)
RBC #/AREA URNS HPF: ABNORMAL /HPF (ref 0–5)
SARS-COV-2 RNA RESP QL NAA+PROBE: NOT DETECTED
SODIUM SERPL-SCNC: 140 MMOL/L (ref 136–145)
SP GR UR REFRACTOMETRY: 1.01 (ref 1–1.03)
UA: UC IF INDICATED,UAUC: ABNORMAL
UROBILINOGEN UR QL STRIP.AUTO: 2 EU/DL (ref 0.1–1)
WBC # BLD AUTO: 10.1 K/UL (ref 3.6–11)
WBC URNS QL MICRO: ABNORMAL /HPF (ref 0–4)

## 2023-03-06 PROCEDURE — 81001 URINALYSIS AUTO W/SCOPE: CPT

## 2023-03-06 PROCEDURE — 65220000003 HC RM SEMIPRIVATE PSYCH

## 2023-03-06 PROCEDURE — 80307 DRUG TEST PRSMV CHEM ANLYZR: CPT

## 2023-03-06 PROCEDURE — 80053 COMPREHEN METABOLIC PANEL: CPT

## 2023-03-06 PROCEDURE — 82077 ASSAY SPEC XCP UR&BREATH IA: CPT

## 2023-03-06 PROCEDURE — 74011250637 HC RX REV CODE- 250/637: Performed by: STUDENT IN AN ORGANIZED HEALTH CARE EDUCATION/TRAINING PROGRAM

## 2023-03-06 PROCEDURE — 87636 SARSCOV2 & INF A&B AMP PRB: CPT

## 2023-03-06 PROCEDURE — 99285 EMERGENCY DEPT VISIT HI MDM: CPT

## 2023-03-06 PROCEDURE — 74011250637 HC RX REV CODE- 250/637: Performed by: EMERGENCY MEDICINE

## 2023-03-06 PROCEDURE — 74011250637 HC RX REV CODE- 250/637: Performed by: PSYCHIATRY & NEUROLOGY

## 2023-03-06 PROCEDURE — 85025 COMPLETE CBC W/AUTO DIFF WBC: CPT

## 2023-03-06 PROCEDURE — 36415 COLL VENOUS BLD VENIPUNCTURE: CPT

## 2023-03-06 PROCEDURE — 81025 URINE PREGNANCY TEST: CPT

## 2023-03-06 RX ORDER — VENLAFAXINE HYDROCHLORIDE 75 MG/1
150 CAPSULE, EXTENDED RELEASE ORAL
Status: DISCONTINUED | OUTPATIENT
Start: 2023-03-06 | End: 2023-03-06

## 2023-03-06 RX ORDER — ASPIRIN 325 MG/1
100 TABLET, FILM COATED ORAL DAILY
Status: DISCONTINUED | OUTPATIENT
Start: 2023-03-06 | End: 2023-03-10 | Stop reason: HOSPADM

## 2023-03-06 RX ORDER — VENLAFAXINE HYDROCHLORIDE 75 MG/1
225 CAPSULE, EXTENDED RELEASE ORAL
Status: DISCONTINUED | OUTPATIENT
Start: 2023-03-07 | End: 2023-03-10 | Stop reason: HOSPADM

## 2023-03-06 RX ORDER — ACETAMINOPHEN 325 MG/1
650 TABLET ORAL
Status: DISCONTINUED | OUTPATIENT
Start: 2023-03-06 | End: 2023-03-10 | Stop reason: HOSPADM

## 2023-03-06 RX ORDER — HYDROXYZINE 50 MG/1
50 TABLET, FILM COATED ORAL
Status: DISCONTINUED | OUTPATIENT
Start: 2023-03-06 | End: 2023-03-10 | Stop reason: HOSPADM

## 2023-03-06 RX ORDER — FOLIC ACID 1 MG/1
1 TABLET ORAL DAILY
Status: DISCONTINUED | OUTPATIENT
Start: 2023-03-06 | End: 2023-03-10 | Stop reason: HOSPADM

## 2023-03-06 RX ORDER — IBUPROFEN 200 MG
1 TABLET ORAL DAILY
Status: DISCONTINUED | OUTPATIENT
Start: 2023-03-06 | End: 2023-03-06

## 2023-03-06 RX ORDER — TRAZODONE HYDROCHLORIDE 50 MG/1
50 TABLET ORAL
Status: DISCONTINUED | OUTPATIENT
Start: 2023-03-06 | End: 2023-03-10 | Stop reason: HOSPADM

## 2023-03-06 RX ORDER — CHLORDIAZEPOXIDE HYDROCHLORIDE 25 MG/1
25 CAPSULE, GELATIN COATED ORAL
Status: DISCONTINUED | OUTPATIENT
Start: 2023-03-06 | End: 2023-03-06

## 2023-03-06 RX ORDER — CHLORDIAZEPOXIDE HYDROCHLORIDE 25 MG/1
25 CAPSULE, GELATIN COATED ORAL 4 TIMES DAILY
Status: DISCONTINUED | OUTPATIENT
Start: 2023-03-06 | End: 2023-03-08

## 2023-03-06 RX ORDER — IBUPROFEN 200 MG
1 TABLET ORAL DAILY
Status: DISCONTINUED | OUTPATIENT
Start: 2023-03-07 | End: 2023-03-07

## 2023-03-06 RX ORDER — ADHESIVE BANDAGE
30 BANDAGE TOPICAL DAILY PRN
Status: DISCONTINUED | OUTPATIENT
Start: 2023-03-06 | End: 2023-03-10 | Stop reason: HOSPADM

## 2023-03-06 RX ORDER — LORAZEPAM 1 MG/1
2 TABLET ORAL ONCE
Status: COMPLETED | OUTPATIENT
Start: 2023-03-06 | End: 2023-03-06

## 2023-03-06 RX ORDER — VENLAFAXINE HYDROCHLORIDE 75 MG/1
75 CAPSULE, EXTENDED RELEASE ORAL
Status: COMPLETED | OUTPATIENT
Start: 2023-03-06 | End: 2023-03-06

## 2023-03-06 RX ORDER — IBUPROFEN 200 MG
1 TABLET ORAL DAILY
Status: DISCONTINUED | OUTPATIENT
Start: 2023-03-06 | End: 2023-03-06 | Stop reason: SDUPTHER

## 2023-03-06 RX ADMIN — FOLIC ACID 1 MG: 1 TABLET ORAL at 18:37

## 2023-03-06 RX ADMIN — CHLORDIAZEPOXIDE HYDROCHLORIDE 25 MG: 25 CAPSULE ORAL at 21:35

## 2023-03-06 RX ADMIN — CHLORDIAZEPOXIDE HYDROCHLORIDE 25 MG: 25 CAPSULE ORAL at 10:08

## 2023-03-06 RX ADMIN — CHLORDIAZEPOXIDE HYDROCHLORIDE 25 MG: 25 CAPSULE ORAL at 18:37

## 2023-03-06 RX ADMIN — LORAZEPAM 2 MG: 1 TABLET ORAL at 10:08

## 2023-03-06 RX ADMIN — THIAMINE HCL TAB 100 MG 100 MG: 100 TAB at 18:37

## 2023-03-06 RX ADMIN — VENLAFAXINE HYDROCHLORIDE 150 MG: 75 CAPSULE, EXTENDED RELEASE ORAL at 04:55

## 2023-03-06 RX ADMIN — VENLAFAXINE HYDROCHLORIDE 75 MG: 75 CAPSULE, EXTENDED RELEASE ORAL at 05:56

## 2023-03-06 RX ADMIN — TRAZODONE HYDROCHLORIDE 50 MG: 50 TABLET ORAL at 21:35

## 2023-03-06 NOTE — ED NOTES
TRANSFER - OUT REPORT:    Verbal report given to Jhoana Richard on Naina Bonilla  being transferred to [facility] for routine progression of care       Report consisted of patients Situation, Background, Assessment and   Recommendations(SBAR). Information from the following report(s) SBAR, ED Summary, and MAR was reviewed with the receiving nurse. Lines:       Opportunity for questions and clarification was provided.       Patient transported with:   [employer]

## 2023-03-06 NOTE — ED NOTES
Pt on phone with boyfriend, asking nurse to speak with boyfriend and provide an update. Update provided at this time. Boyfriend requesting to be updated when patient is moved to [facility] unit and can bring patient clothes.

## 2023-03-06 NOTE — BH NOTES
NURSING ADMISSION NOTE       23 y/o  female, admitted to Rm 238-2, to the service of Dr. Milagros Llanes, w/DX: DEPRESSION W/SUICIDE ATTEMPT (took pills w/ETOH 3/4/23) AND SA (ETOH; level 286). Arrived on the unit, via w/c, from the ED, accompanied by a , and a female ED staff member. Alert/fully oriented. Denied being a pt on 2S previously; however, has had inpatient SA  tx. Pt's uncle took out an ECO. Pt is here under a TDO. Affect flat. Anxious; + tremors. Rated her depression and anxiety levels 8/10. Denied having thoughts to self harm. Denied a/v hallucinations. Identified her boyfriend, as her support system. [place], @ [employer] currently , from her , who is in the [employer]. Reported she has a lengthy psych hx, and currently takes Clonidine, Effexor, and Trazodone. Pt reported she has a BS degree in Social Work. Pt was asked her goal, for this hospitalization, and she stated, \"I don't really know. \"     Wearing a green hospital gown, pants, socks, and shoes. Cooperative with personal search by Narendra Mcmillan. Pt has several tattoos. No breaks in skin. Wearing eyeglasses. ALLERGIES: Sulfur  UDS: Negative. COVID: Not detected. FLU: Negative. PSYCH: Pt has a hx of cutting. VITAL SIGNS: T98.6 P100 R18 /73. Oriented to unit. Q 15 mins checks initiated, for safety.

## 2023-03-06 NOTE — BH NOTES
INITIAL PSYCHIATRIC EVALUATION            IDENTIFICATION:    Patient Name  Parveen Merlos   Date of Birth 1995   [facility] [id number]   Medical Record Number  [id number]      Age  24 y.o. PCP Gordon Elizondo MD   Admit date:  3/6/2023    Room Number  238/02  @ [facility]   Date of Service  3/6/2023            HISTORY         REASON FOR HOSPITALIZATION:    CC: Patient with reported depression, suicidal attempt on 3/4/2023 with pills and was stopped by her boyfriend, alcohol intoxication, has been hitting herself       HISTORY OF PRESENT ILLNESS:     The patient, Parveen Merlos, is a 24 y.o. WHITE/NON- female admitted to the behavioral health floor after patient was reported reported suicidal attempt on 3 4 attempting to take pills and was stopped by her boy's occasion, hitting herself and has been irritable. Patient denies auditory or visual hallucinations. It is also noted that she is currently  from her  who is in the middle of 3. Her boyfriend is her current support system. Patient is a poor historian and reports that she is actively withdrawing and feels shaky and expresses needing further help. PAST PSYCHIATRIC HISTORY:   Has had previous psychiatric treatment and noted on antidepressants patient is a poor historian and information is as obtained from review of electronic records talking to behavioral health staff and talking to patient. TRAUMA HISTORY:   Unable to be obtained at this time. ALLERGIES:   Allergies   Allergen Reactions    Sulfa (Sulfonamide Antibiotics) Anaphylaxis, Hives and Shortness of Breath      MEDICATIONS PRIOR TO ADMISSION:   Medications Prior to Admission   Medication Sig    naltrexone (DEPADE) 50 mg tablet Take 1 Tablet by mouth daily.  spironolactone (ALDACTONE) 50 mg tablet Take 2 Tablets by mouth daily.  chlordiazePOXIDE (LIBRIUM) 25 mg capsule Take 1 Capsule by mouth three (3) times daily as needed for Anxiety. Max Daily Amount: 75 mg.  pantoprazole (PROTONIX) 20 mg tablet take 1 tablet by mouth once daily    lactulose (CHRONULAC) 10 gram/15 mL solution Take 15 mL by mouth two (2) times a day.  cloNIDine HCL (CATAPRES) 0.1 mg tablet Take 1 Tablet by mouth two (2) times a day. (Patient taking differently: Take 0.2 mg by mouth two (2) times a day.)    gabapentin (NEURONTIN) 100 mg capsule Take 1 Capsule by mouth two (2) times a day.  butalbital-acetaminophen-caffeine (FIORICET, ESGIC) -40 mg per tablet Take 1 Tablet by mouth every six (6) hours as needed for Headache.  venlafaxine-SR (EFFEXOR-XR) 75 mg capsule Take 1 Capsule by mouth daily. Total of 225 mg daily in combination with 15o capsule.  ondansetron (ZOFRAN ODT) 4 mg disintegrating tablet Take 1 Tablet by mouth every eight (8) hours as needed for Nausea or Vomiting.  diazePAM (Valium) 10 mg tablet Take 0.5 Tablets by mouth every six (6) hours as needed (alcohol withdrawal symptoms). Max Daily Amount: 20 mg.    venlafaxine-SR (EFFEXOR-XR) 150 mg capsule Take 1 Capsule by mouth daily.  traZODone (DESYREL) 100 mg tablet Take 1 Tablet by mouth nightly.       PAST MEDICAL HISTORY:   Past Medical History:   Diagnosis Date    Anemia     Chronic kidney disease     Chronic pain     Contact dermatitis and eczema due to cause     Depression     Liver disease     Polycystic ovarian syndrome      Past Surgical History:   Procedure Laterality Date    HX ADENOIDECTOMY  01/01/2001    HX CHOLECYSTECTOMY  10/2022    HX COLONOSCOPY      HX ENDOSCOPY      HX TONSILLECTOMY  01/01/2010    HX WISDOM TEETH EXTRACTION        SOCIAL HISTORY:   Social History     Socioeconomic History    Marital status:      Spouse name: Not on file    Number of children: Not on file    Years of education: Not on file    Highest education level: Not on file   Occupational History    Not on file   Tobacco Use    Smoking status: Former     Packs/day: 0.50 Years: 8.00     Pack years: 4.00     Types: Cigarettes     Quit date: 2021     Years since quittin.2    Smokeless tobacco: Current   Vaping Use    Vaping Use: Every day    Start date: 2021    Substances: Nicotine    Devices: Disposable   Substance and Sexual Activity    Alcohol use: Yes     Comment: intermittently per pt    Drug use: Not Currently     Types: Marijuana     Comment: daily    Sexual activity: Yes     Partners: Male     Birth control/protection: I.U.D. Other Topics Concern     Service Not Asked    Blood Transfusions Not Asked    Caffeine Concern Not Asked    Occupational Exposure Not Asked    Hobby Hazards Not Asked    Sleep Concern Not Asked    Stress Concern Not Asked    Weight Concern Not Asked    Special Diet Not Asked    Back Care Not Asked    Exercise Not Asked    Bike Helmet Not Asked    [address] Not Asked    Self-Exams Not Asked   Social History Narrative    Not on file     Social Determinants of Health     Financial Resource Strain: Low Risk     Difficulty of Paying Living Expenses: Not hard at all   Food Insecurity: No Food Insecurity    Worried About Running Out of Food in the Last Year: Never true    Teri of Food in the Last Year: Never true   Transportation Needs: Not on file   Physical Activity: Not on file   Stress: Not on file   Social Connections: Not on file   Intimate Partner Violence: Not on file   Housing Stability: Not on file      FAMILY HISTORY: History reviewed. No pertinent family history.    Family History   Problem Relation Age of Onset    Alcohol abuse Father     Diabetes Maternal Grandfather     Other Maternal Grandmother         macular degeneration    Cancer Maternal Grandmother         skin    Depression Mother     Atopy Sister     No Known Problems Brother     No Known Problems Paternal Grandmother     No Known Problems Paternal Grandfather     Heart Disease Neg Hx     Hypertension Neg Hx        REVIEW OF SYSTEMS:   ROS  Pertinent items are noted in the History of Present Illness. All other Systems reviewed and are considered negative. MENTAL STATUS EXAM & VITALS     MENTAL STATUS EXAM (MSE):    General Presentation age appropriate and disheveled, passive   Orientation Alert and Oriented x 2   Vital Signs  See below (reviewed 3/6/2023); Vital Signs (BP, Pulse, & Temp) are within normal limits if not listed below.    Gait and Station Stable/steady, no ataxia   Musculoskeletal System No extrapyramidal symptoms (EPS); no abnormal muscular movements or Tardive Dyskinesia (TD); muscle strength and tone are within normal limits   Language No aphasia or dysarthria   Speech:  normal volume   Thought Processes Not logical; slow rate of thoughts; poor abstract reasoning/computation   Thought Associations tangential   Thought Content free of hallucinations   Suicidal Ideations intention   Homicidal Ideations none   Mood:  depressed   Affect:  depressed   Memory recent  impaired   Memory remote:  impaired   Concentration/Attention:  distractable   Fund of Knowledge average   Insight:  impaired   Reliability fair   Judgment:  impaired          VITALS:     Patient Vitals for the past 24 hrs:   Temp Pulse Resp BP SpO2   03/06/23 0817 98.1 °F (36.7 °C) (!) 108 18 131/82 98 %   03/06/23 0557 -- 97 16 106/64 95 %   03/06/23 0233 -- 96 -- -- --   03/06/23 0029 97.5 °F (36.4 °C) (!) 147 16 129/70 99 %     Wt Readings from Last 3 Encounters:   03/06/23 65.3 kg (144 lb)   02/13/23 68.3 kg (150 lb 9.6 oz)   01/28/23 65.8 kg (145 lb)     Temp Readings from Last 3 Encounters:   03/06/23 98.1 °F (36.7 °C)   02/13/23 97.9 °F (36.6 °C) (Temporal)   02/01/23 98.4 °F (36.9 °C) (Oral)     BP Readings from Last 3 Encounters:   03/06/23 131/82   02/13/23 (!) 98/51   02/01/23 132/84     Pulse Readings from Last 3 Encounters:   03/06/23 (!) 108   02/13/23 (!) 101   02/01/23 (!) 106            DATA     LABORATORY DATA:  Labs Reviewed   CBC WITH AUTOMATED DIFF - Abnormal; Notable for the following components:       Result Value    RDW 14.8 (*)     PLATELET 034 (*)     All other components within normal limits   METABOLIC PANEL, COMPREHENSIVE - Abnormal; Notable for the following components:    Potassium 3.4 (*)     Chloride 109 (*)     Glucose 167 (*)     BUN/Creatinine ratio 9 (*)     AST (SGOT) 38 (*)     A-G Ratio 1.0 (*)     All other components within normal limits   ETHYL ALCOHOL - Abnormal; Notable for the following components:    ALCOHOL(ETHYL),SERUM 283 (*)     All other components within normal limits   URINALYSIS W/ REFLEX CULTURE - Abnormal; Notable for the following components:    Appearance Turbid (*)     Blood Small (*)     Urobilinogen 2.0 (*)     Leukocyte Esterase Trace (*)     Epithelial cells Moderate (*)     Bacteria 1+ (*)     All other components within normal limits   ETHYL ALCOHOL - Abnormal; Notable for the following components:    ALCOHOL(ETHYL),SERUM 195 (*)     All other components within normal limits   COVID-19 WITH INFLUENZA A/B   DRUG SCREEN, URINE   HCG URINE, QL     Admission on 03/06/2023   Component Date Value Ref Range Status    WBC 03/06/2023 10.1  3.6 - 11.0 K/uL Final    RBC 03/06/2023 4.56  3.80 - 5.20 M/uL Final    HGB 03/06/2023 13.1  11.5 - 16.0 g/dL Final    HCT 03/06/2023 39.1  35.0 - 47.0 % Final    MCV 03/06/2023 85.7  80.0 - 99.0 FL Final    MCH 03/06/2023 28.7  26.0 - 34.0 PG Final    MCHC 03/06/2023 33.5  30.0 - 36.5 g/dL Final    RDW 03/06/2023 14.8 (A)  11.5 - 14.5 % Final    PLATELET 25/96/8522 591 (A)  150 - 400 K/uL Final    MPV 03/06/2023 9.6  8.9 - 12.9 FL Final    NRBC 03/06/2023 0.0  0.0  WBC Final    ABSOLUTE NRBC 03/06/2023 0.00  0.00 - 0.01 K/uL Final    NEUTROPHILS 03/06/2023 60  32 - 75 % Final    LYMPHOCYTES 03/06/2023 31  12 - 49 % Final    MONOCYTES 03/06/2023 7  5 - 13 % Final    EOSINOPHILS 03/06/2023 1  0 - 7 % Final    BASOPHILS 03/06/2023 1  0 - 1 % Final  IMMATURE GRANULOCYTES 03/06/2023 0  0 - 0.5 % Final    ABS. NEUTROPHILS 03/06/2023 6.1  1.8 - 8.0 K/UL Final    ABS. LYMPHOCYTES 03/06/2023 3.1  0.8 - 3.5 K/UL Final    ABS. MONOCYTES 03/06/2023 0.7  0.0 - 1.0 K/UL Final    ABS. EOSINOPHILS 03/06/2023 0.1  0.0 - 0.4 K/UL Final    ABS. BASOPHILS 03/06/2023 0.1  0.0 - 0.1 K/UL Final    ABS. IMM. GRANS. 03/06/2023 0.0  0.00 - 0.04 K/UL Final    DF 03/06/2023 AUTOMATED    Final    Sodium 03/06/2023 140  136 - 145 mmol/L Final    Potassium 03/06/2023 3.4 (A)  3.5 - 5.1 mmol/L Final    Chloride 03/06/2023 109 (A)  97 - 108 mmol/L Final    CO2 03/06/2023 26  21 - 32 mmol/L Final    Anion gap 03/06/2023 5  5 - 15 mmol/L Final    Glucose 03/06/2023 167 (A)  65 - 100 mg/dL Final    BUN 03/06/2023 6  6 - 20 mg/dL Final    Creatinine 03/06/2023 0.69  0.55 - 1.02 mg/dL Final    BUN/Creatinine ratio 03/06/2023 9 (A)  12 - 20   Final    eGFR 03/06/2023 >60  >60 ml/min/1.73m2 Final    Calcium 03/06/2023 8.8  8.5 - 10.1 mg/dL Final    Bilirubin, total 03/06/2023 0.8  0.2 - 1.0 mg/dL Final    AST (SGOT) 03/06/2023 38 (A)  15 - 37 U/L Final    ALT (SGPT) 03/06/2023 30  12 - 78 U/L Final    Alk.  phosphatase 03/06/2023 89  45 - 117 U/L Final    Protein, total 03/06/2023 7.6  6.4 - 8.2 g/dL Final    Albumin 03/06/2023 3.8  3.5 - 5.0 g/dL Final    Globulin 03/06/2023 3.8  2.0 - 4.0 g/dL Final    A-G Ratio 03/06/2023 1.0 (A)  1.1 - 2.2   Final    ALCOHOL(ETHYL),SERUM 03/06/2023 283 (A)  <10 mg/dL Final    Color 03/06/2023 Yellow/Straw    Final    Appearance 03/06/2023 Turbid (A)  Clear   Final    Specific gravity 03/06/2023 1.010  1.003 - 1.030   Final    pH (UA) 03/06/2023 6.0  5.0 - 8.0   Final    Protein 03/06/2023 Negative  Negative mg/dL Final    Glucose 03/06/2023 Negative  Negative mg/dL Final    Ketone 03/06/2023 Negative  Negative mg/dL Final    Bilirubin 03/06/2023 Negative  Negative   Final    Blood 03/06/2023 Small (A)  Negative   Final  Urobilinogen 03/06/2023 2.0 (A)  0.1 - 1.0 EU/dL Final    Nitrites 03/06/2023 Negative  Negative   Final    Leukocyte Esterase 03/06/2023 Trace (A)  Negative   Final    UA:UC IF INDICATED 03/06/2023 Culture not indicated by UA result  Culture not indicated by UA result   Final    WBC 03/06/2023 5-10  0 - 4 /hpf Final    RBC 03/06/2023 0-5  0 - 5 /hpf Final    Epithelial cells 03/06/2023 Moderate (A)  Few /lpf Final    Bacteria 03/06/2023 1+ (A)  Negative /hpf Final    Mucus 03/06/2023 Trace  /lpf Final    AMPHETAMINES 03/06/2023 Negative  Negative   Final    BARBITURATES 03/06/2023 Negative  Negative   Final    BENZODIAZEPINES 03/06/2023 Negative  Negative   Final    COCAINE 03/06/2023 Negative  Negative   Final    METHADONE 03/06/2023 Negative  Negative   Final    OPIATES 03/06/2023 Negative  Negative   Final    PCP(PHENCYCLIDINE) 03/06/2023 Negative  Negative   Final    THC (TH-CANNABINOL) 03/06/2023 Negative  Negative   Final    Drug screen comment 03/06/2023     Final                    Value: This test is a screen for drugs of abuse in a medical setting only (i.e., they are unconfirmed results and as such must not be used for non-medical purposes, e.g.,employment testing, legal testing). Due to its inherent nature, false positive (FP) and false negative (FN) results may be obtained. Therefore, if necessary for medical care, recommend confirmation of positive findings by GC/MS.  SARS-CoV-2 by PCR 03/06/2023 Not Detected  Not Detected   Final    Influenza A by PCR 03/06/2023 Not Detected  Not Detected   Final    Influenza B by PCR 03/06/2023 Not Detected  Not Detected   Final    HCG urine, QL 03/06/2023 Negative  Negative   Final    ALCOHOL(ETHYL),SERUM 03/06/2023 195 (A)  <10 mg/dL Final        RADIOLOGY REPORTS:  No results found for this or any previous visit. No results found.            MEDICATIONS       ALL MEDICATIONS  Current Facility-Administered Medications   Medication Dose Route Frequency    [START ON 3/7/2023] venlafaxine-SR (EFFEXOR-XR) capsule 225 mg  225 mg Oral DAILY WITH BREAKFAST    chlordiazePOXIDE (LIBRIUM) capsule 25 mg  25 mg Oral Q4H PRN    hydrOXYzine HCL (ATARAX) tablet 50 mg  50 mg Oral TID PRN    traZODone (DESYREL) tablet 50 mg  50 mg Oral QHS PRN    acetaminophen (TYLENOL) tablet 650 mg  650 mg Oral Q4H PRN    magnesium hydroxide (MILK OF MAGNESIA) 400 mg/5 mL oral suspension 30 mL  30 mL Oral DAILY PRN    [START ON 3/7/2023] nicotine (NICODERM CQ) 21 mg/24 hr patch 1 Patch  1 Patch TransDERmal DAILY      SCHEDULED MEDICATIONS  Current Facility-Administered Medications   Medication Dose Route Frequency    [START ON 3/7/2023] venlafaxine-SR (EFFEXOR-XR) capsule 225 mg  225 mg Oral DAILY WITH BREAKFAST    [START ON 3/7/2023] nicotine (NICODERM CQ) 21 mg/24 hr patch 1 Patch  1 Patch TransDERmal DAILY                ASSESSMENT & PLAN        The patient, Mason Potts, is a 24 y.o.  female who presents at this time for treatment of the following diagnoses:  Patient Active Hospital Problem List:    Major depressive disorder, moderate recurrent with suicidal gesture attempt  Alcohol dependence currently having withdrawals  Rule out bipolar disorder       Patient is a limited historian as she is actively withdrawing. Patient was seen in the emergency department. Patient was briefly explained the treatment process and being admitted inpatient.   Medical consult in place    Current Facility-Administered Medications:     [START ON 3/7/2023] venlafaxine-SR (EFFEXOR-XR) capsule 225 mg, 225 mg, Oral, DAILY WITH BREAKFAST, Bao Klein MD    hydrOXYzine HCL (ATARAX) tablet 50 mg, 50 mg, Oral, TID PRN, Gracie Amador MD    traZODone (DESYREL) tablet 50 mg, 50 mg, Oral, QHS PRN, Gracie Amador MD    acetaminophen (TYLENOL) tablet 650 mg, 650 mg, Oral, Q4H PRN, Miracle Garrido MD    magnesium hydroxide (MILK OF MAGNESIA) 400 mg/5 mL oral suspension 30 mL, 30 mL, Oral, DAILY Radha BRAUN MD  Estel All  [START ON 3/7/2023] nicotine (NICODERM CQ) 21 mg/24 hr patch 1 Patch, 1 Patch, TransDERmal, DAILY, Miracle Garrido MD    thiamine mononitrate (B-1) tablet 100 mg, 100 mg, Oral, DAILY, Miracle Garrido MD    folic acid (FOLVITE) tablet 1 mg, 1 mg, Oral, DAILY, Miracle Garrido MD    chlordiazePOXIDE (LIBRIUM) capsule 25 mg, 25 mg, Oral, QID, Miracle Garrido MD     Reviewed admission labs and medical tests in the EHR     Reviewed old psychiatric and medical records available in the EHR. Gather additional collateral information from family and o/p treatment team to further elucidate the nature of patient's psychopathology and baselline level of psychiatric functioning. Placed on close observation, for safety    Patient to engage in individual therapy, group therapy support group, psychoeducational group, safety planning. Strengths-patient able to express self, average intelligence, has family support. Weakness-poor coping, comorbid alcohol dependance    Discharge Criteria-  Patient is able to show progress and improvement in neurovegetative symptoms of depression,si   Patient is no longer actively suicidal or homicidal and has no command hallucinations. Patient  is able to present with healthy ways to cope with current stressors.      ESTIMATED LENGTH OF STAY:    5-7 days                              SIGNED:    Jaime Chahal MD  3/6/2023

## 2023-03-06 NOTE — ED NOTES
Pt agreeable for blood draw and covid swab. Samples obtained and sent to lab. Pt ambulatory to restroom with tech and  at this time for urine sample.

## 2023-03-06 NOTE — GROUP NOTE
IP  GROUP DOCUMENTATION INDIVIDUAL                                                                          Group Therapy Note    Date: 3/6/2023    Group Start Time: 1320  Group End Time: 1400  Group Topic: Process Group - Inpatient    [facility]    Liz Alfonso    IP [address] [facility] GROUP DOCUMENTATION GROUP    Group Therapy Note  Process group was focused on fair fighting techniques. Writer provided pts with an informational sheet of how to communication in a positive manner when angry. Pt at times would have to be redirected on the topic. They were able to identify their own ways of fighting and how to better their communication.   Attendees: 2-7       Attendance: Did not attend        Additional Notes:  Pt was encouraged to attend and chose not to     [facility]

## 2023-03-06 NOTE — BSMART NOTE
DAVIDD brought patient in under ECO that was served at 0000. Ollie from [facility] states that she will call back to assess patient.

## 2023-03-06 NOTE — ED NOTES
Pt requesting to make a telephone call. Writer attempted to call patients spouse so patient could speak with, no answer at this time. Voicemail left.

## 2023-03-06 NOTE — BSMART NOTE
Patient currently being assessed by Nadia Ratliff from [facility] via [employer]. [address] [address]. Patient requested venlafaxine 225 mg. She states that she is going through medication withdrawals because she did not take the venlafaxine tonight prior to coming to ER. Informed Dr. Jory Lao who states that he will order the medication.

## 2023-03-06 NOTE — BSMART NOTE
[facility] Liaison Team Note     LOS:  8:50     Patient goal(s) for today: \"I'm not sure\"   [facility] Liaison team focus/goals: to provide support, brief therapy (if needed), education (if needed) and recommendations. Progress note: This writer rounded on patient. Patient agreed to talk with this writer and was informed of counselor's role. The patient's appearance is unkempt and shows poor hygiene. The patient's behavior displays tremors, shows poor eye contact, and is restless. The patient is oriented to time, place, person and situation. The patient's speech shows no evidence of impairment. The patient's mood  is depressed and is irritable. The range of affect shows no evidence of impairment. The patient's thought content  demonstrates no evidence of impairment. The thought process shows no evidence of impairment. The patient's perception shows no evidence of impairment. The patient's memory shows no evidence of impairment. The patient's appetite shows no evidence of impairment. The patient's sleep has evidence of insomnia. Insight and judgement are poor at this time. Patient denied suicidal and/or homicidal ideation. Pt complained of being uncomfortable and reported that she was \"going through DT's\". Visually, she was trembling. Pt requested to use the phone so that she could speak with her spouse. When prompted to discuss reasons for her coming to the hospital pt reported \"I'm an alcoholic and that is what is boils down to\". She also reported that she is involved in [place] and has been through rehab. She is receptive to additional help. Barriers to Discharge: ECO/TDO  Guns in the home: no     Outpatient provider(s):  unknown  Insurance info/prescription coverage:  /[facility]/[place]    Diagnosis: Alcohol Use Disorder; Depression     Plan:  Pt is a [facility] bed search. Follow up Psych Consult placed? yes. Psychiatrist updated?  no Participating treatment team members: Camilo Joseph, [address] [place], [place]

## 2023-03-06 NOTE — ED NOTES
Assumed care of patient. Verbal shift change report given to [facility] (oncoming nurse) by Rebecca Marvin (offgoing nurse). Report included the following information SBAR, ED Summary, and MAR.   Pt in bed resting quietly with no complaints at this time

## 2023-03-06 NOTE — ED TRIAGE NOTES
Pt under ECO. Per ECO. Pt is alcoholic and has been drinking for 2 days and has been hitting herself and attempted suicide on 3/4/23 with pills. She was stopped by her boyfriend. Pt admitted to [facility] tonight. Pt very tearful during triage. She denies any hallucinations.

## 2023-03-06 NOTE — PROGRESS NOTES
Problem: Depressed Mood (Adult/Pediatric)  Goal: *STG: Remains safe in hospital    Note: Q 15 mins checks initiated.

## 2023-03-06 NOTE — GROUP NOTE
IP  GROUP DOCUMENTATION INDIVIDUAL                                                                          Group Therapy Note    Date: 3/6/2023    Group Start Time: 1436  Group End Time: 4103  Group Topic: Recreational/Music Therapy    [facility] 2  NON ACUTE    Bobo Pastor    IP [address] [facility] GROUP DOCUMENTATION GROUP    Group Therapy Note    Facilitated leisure skills group to reinforce positive coping and to manage mood through music, social interaction, group activities and art task    Attendees: 4/6       Attendance: Attended    Patient's Goal:  Attend group daily     Interventions/techniques: Art integration and Supported    Follows Directions: Followed directions    Interactions: Interacted appropriately    Mental Status: Calm    Behavior/appearance: Cooperative    Goals Achieved: Able to engage in interactions and Able to listen to others      Additional Notes:  Receptive to listening to music. Declined to work on leisure task. Accepted journal and leisure packet.  Interacted with staff when prompted    SHRADDHA Meraz

## 2023-03-06 NOTE — ED PROVIDER NOTES
[id number]  EMERGENCY DEPARTMENT ENCOUNTER NOTE    Date: 3/6/2023  Patient Name: Raffy Garcia    History of Presenting Illness     Chief Complaint   Patient presents with    Mental Health Problem       History obtained from: Patient    HPI: Raffy Garcia, 24 y.o. female with a past medical history and outpatient medications as listed and reviewed below  presents for suicidal ideations with attempt. She reports that she attempted trying to hurt herself by stopping her medicines and drinking a lot. She was also reported to have attempted suicide 2 days ago using pills but she denies and said it was just alcohol. Currently is asymptomatic. No chest pain, short of breath, vomiting, abdominal pain, lightheadedness, dizziness, or syncope.     Medical History   I reviewed the medical, surgical, family, and social history, as well as allergies:    PCP: Rahel Preston MD    Past Medical History:  Past Medical History:   Diagnosis Date    Anemia     Chronic kidney disease     Chronic pain     Contact dermatitis and eczema due to cause     Depression     Liver disease     Polycystic ovarian syndrome      Past Surgical History:  Past Surgical History:   Procedure Laterality Date    HX ADENOIDECTOMY  01/01/2001    HX CHOLECYSTECTOMY  10/2022    HX COLONOSCOPY      HX ENDOSCOPY      HX TONSILLECTOMY  01/01/2010    HX WISDOM TEETH EXTRACTION       Current Outpatient Medications:  Current Outpatient Medications   Medication Instructions    butalbital-acetaminophen-caffeine (FIORICET, ESGIC) -40 mg per tablet 1 Tablet, Oral, EVERY 6 HOURS AS NEEDED    chlordiazePOXIDE (LIBRIUM) 25 mg, Oral, 3 TIMES DAILY AS NEEDED    cloNIDine HCL (CATAPRES) 0.1 mg, Oral, 2 TIMES DAILY    diazePAM (VALIUM) 5 mg, Oral, EVERY 6 HOURS AS NEEDED    gabapentin (NEURONTIN) 100 mg, Oral, 2 TIMES DAILY    lactulose (CHRONULAC) 10 gram/15 mL solution 15 mL, Oral, 2 TIMES DAILY    naltrexone (DEPADE) 50 mg, Oral, DAILY    ondansetron (ZOFRAN ODT) 4 mg, Oral, EVERY 8 HOURS AS NEEDED    pantoprazole (PROTONIX) 20 mg tablet take 1 tablet by mouth once daily    spironolactone (ALDACTONE) 100 mg, Oral, DAILY    traZODone (DESYREL) 100 mg, Oral, EVERY BEDTIME    venlafaxine-SR (EFFEXOR-XR) 150 mg, Oral, DAILY    venlafaxine-SR (EFFEXOR-XR) 75 mg, Oral, DAILY, Total of 225 mg daily in combination with 15o capsule. Family History:  Family History   Problem Relation Age of Onset    Alcohol abuse Father     Diabetes Maternal Grandfather     Other Maternal Grandmother         macular degeneration    Cancer Maternal Grandmother         skin    Depression Mother     Atopy Sister     No Known Problems Brother     No Known Problems Paternal Grandmother     No Known Problems Paternal Grandfather     Heart Disease Neg Hx     Hypertension Neg Hx      Social History:  Social History     Tobacco Use    Smoking status: Former     Packs/day: 0.50     Years: 8.00     Pack years: 4.00     Types: Cigarettes     Quit date: 2021     Years since quittin.2    Smokeless tobacco: Current   Vaping Use    Vaping Use: Every day    Start date: 2021    Substances: Nicotine    Devices: Disposable   Substance Use Topics    Alcohol use: Yes     Comment: intermittently per pt    Drug use: Not Currently     Types: Marijuana     Comment: daily     Allergies: Allergies   Allergen Reactions    Sulfa (Sulfonamide Antibiotics) Anaphylaxis, Hives and Shortness of Breath       Review of Systems     Review of Systems  Negative: Positives and pertinent negatives as per HPI. All other systems were reviewed and are negative. Physical Exam & Vital Signs   Vital Signs - I reviewed the patient's vital signs.     Patient Vitals for the past 12 hrs:   Temp Pulse Resp BP SpO2   23 0817 98.1 °F (36.7 °C) (!) 108 18 131/82 98 %   23 0557 -- 97 16 106/64 95 %   23 0233 -- 96 -- -- --   23 0029 97.5 °F (36.4 °C) (!) 147 16 129/70 99 %     Physical Exam:    GENERAL: awake, alert, cooperative, not in distress  HEENT:  * Pupils equal, EOMI  * Head atraumatic  CV:  * audible heart sounds  * warm and perfused extremities bilaterally  PULMONARY: Good air movement, no wheezes, no crackles  ABDOMEN/: soft, no distension, no guarding, no abdominal tenderness  EXTREMITIES/BACK: warm and perfused, no tenderness, no edema  SKIN: no rashes or signs of trauma  NEURO:  * Speech clear  * Moves U&LE to command    Medical Decision Making     Patient is a 24 y.o. female presenting for psychiatric evaluation. Vitals reveal  tachycardia, resolved on exam  and physical exam reveals no significant abnormalities. Based on the history, physical exam, risk factors, and vitals signs, I favor the following differential diagnoses: bipolar disorder, psychosis, schizophrenia, medication noncompliance, depression, substance abuse, suicidal ideation, homicidal ideation, acute stress reaction, personality disorder. Will consult psychiatry. See ED Course and Reassessment for discussion and interpretations. Consultant Discussions/Recs: None  Records Reviewed: Nursing Notes  Social Determinants of health affecting management: None    ED Course & Reassessment     ED Course:     ED Course as of 03/06/23 0847   Mon Mar 06, 2023   0209 CBC does not show any evidence of acute process. Leukocytosis not present to suggest infection. Hemoglobin not suggestive of acute anemia. Noted Hypokalemia. No other significant electrolyte derangements. Creatinine is not elevated more than baseline range making JAZZ unlikely. No significant transaminitis noted. Normal bilirubin. ETOH elevated. BHCG testing rules out pregnancy. No concern for UTI. Bacteria 1+ but epithelials high and no LE or nitrites -> contamination [SS]   0210 Drug screen negative. COVID-19 testing is negative.     Influenza swab negative.     [SS]      ED Course User Index  [SS] Luna Hernandez MD Reassessment:    Will need psych admission. Diagnosis     Clinical Impression:   1. Suicidal ideation    2. Alcoholic intoxication without complication [facility])        Final Disposition     Psychiatric Admission: ADMITTED TO PSYCHIATRIC FACILITY    After completion of ED workup and medical clearance, the patient was deemed a candidate for inpatient psychiatric treatment. Procedures, Critical Care, & Clinical Tools   Performed by: Veronica Johnson MD  Procedures     Not Applicable     Results, Consults, Medications     Consults:  None   Labs:  Recent Results (from the past 12 hour(s))   CBC WITH AUTOMATED DIFF    Collection Time: 03/06/23 12:44 AM   Result Value Ref Range    WBC 10.1 3.6 - 11.0 K/uL    RBC 4.56 3.80 - 5.20 M/uL    HGB 13.1 11.5 - 16.0 g/dL    HCT 39.1 35.0 - 47.0 %    MCV 85.7 80.0 - 99.0 FL    MCH 28.7 26.0 - 34.0 PG    MCHC 33.5 30.0 - 36.5 g/dL    RDW 14.8 (H) 11.5 - 14.5 %    PLATELET 184 (L) 949 - 400 K/uL    MPV 9.6 8.9 - 12.9 FL    NRBC 0.0 0.0  WBC    ABSOLUTE NRBC 0.00 0.00 - 0.01 K/uL    NEUTROPHILS 60 32 - 75 %    LYMPHOCYTES 31 12 - 49 %    MONOCYTES 7 5 - 13 %    EOSINOPHILS 1 0 - 7 %    BASOPHILS 1 0 - 1 %    IMMATURE GRANULOCYTES 0 0 - 0.5 %    ABS. NEUTROPHILS 6.1 1.8 - 8.0 K/UL    ABS. LYMPHOCYTES 3.1 0.8 - 3.5 K/UL    ABS. MONOCYTES 0.7 0.0 - 1.0 K/UL    ABS. EOSINOPHILS 0.1 0.0 - 0.4 K/UL    ABS. BASOPHILS 0.1 0.0 - 0.1 K/UL    ABS. IMM.  GRANS. 0.0 0.00 - 0.04 K/UL    DF AUTOMATED     METABOLIC PANEL, COMPREHENSIVE    Collection Time: 03/06/23 12:44 AM   Result Value Ref Range    Sodium 140 136 - 145 mmol/L    Potassium 3.4 (L) 3.5 - 5.1 mmol/L    Chloride 109 (H) 97 - 108 mmol/L    CO2 26 21 - 32 mmol/L    Anion gap 5 5 - 15 mmol/L    Glucose 167 (H) 65 - 100 mg/dL    BUN 6 6 - 20 mg/dL    Creatinine 0.69 0.55 - 1.02 mg/dL    BUN/Creatinine ratio 9 (L) 12 - 20      eGFR >60 >60 ml/min/1.73m2    Calcium 8.8 8.5 - 10.1 mg/dL    Bilirubin, total 0.8 0.2 - 1.0 mg/dL    AST (SGOT) 38 (H) 15 - 37 U/L    ALT (SGPT) 30 12 - 78 U/L    Alk. phosphatase 89 45 - 117 U/L    Protein, total 7.6 6.4 - 8.2 g/dL    Albumin 3.8 3.5 - 5.0 g/dL    Globulin 3.8 2.0 - 4.0 g/dL    A-G Ratio 1.0 (L) 1.1 - 2.2     ETHYL ALCOHOL    Collection Time: 03/06/23 12:44 AM   Result Value Ref Range    ALCOHOL(ETHYL),SERUM 283 (H) <10 mg/dL   URINALYSIS W/ REFLEX CULTURE    Collection Time: 03/06/23 12:44 AM    Specimen: Urine   Result Value Ref Range    Color Yellow/Straw      Appearance Turbid (A) Clear      Specific gravity 1.010 1.003 - 1.030      pH (UA) 6.0 5.0 - 8.0      Protein Negative Negative mg/dL    Glucose Negative Negative mg/dL    Ketone Negative Negative mg/dL    Bilirubin Negative Negative      Blood Small (A) Negative      Urobilinogen 2.0 (H) 0.1 - 1.0 EU/dL    Nitrites Negative Negative      Leukocyte Esterase Trace (A) Negative      UA:UC IF INDICATED Culture not indicated by UA result Culture not indicated by UA result      WBC 5-10 0 - 4 /hpf    RBC 0-5 0 - 5 /hpf    Epithelial cells Moderate (A) Few /lpf    Bacteria 1+ (A) Negative /hpf    Mucus Trace /lpf   DRUG SCREEN, URINE    Collection Time: 03/06/23 12:44 AM   Result Value Ref Range    AMPHETAMINES Negative Negative      BARBITURATES Negative Negative      BENZODIAZEPINES Negative Negative      COCAINE Negative Negative      METHADONE Negative Negative      OPIATES Negative Negative      PCP(PHENCYCLIDINE) Negative Negative      THC (TH-CANNABINOL) Negative Negative      Drug screen comment        This test is a screen for drugs of abuse in a medical setting only (i.e., they are unconfirmed results and as such must not be used for non-medical purposes, e.g.,employment testing, legal testing). Due to its inherent nature, false positive (FP) and false negative (FN) results may be obtained. Therefore, if necessary for medical care, recommend confirmation of positive findings by GC/MS.      COVID-19 WITH INFLUENZA A/B    Collection Time: 03/06/23 12:44 AM   Result Value Ref Range    SARS-CoV-2 by PCR Not Detected Not Detected      Influenza A by PCR Not Detected Not Detected      Influenza B by PCR Not Detected Not Detected     HCG URINE, QL    Collection Time: 03/06/23 12:44 AM   Result Value Ref Range    HCG urine, QL Negative Negative     ETHYL ALCOHOL    Collection Time: 03/06/23  5:59 AM   Result Value Ref Range    ALCOHOL(ETHYL),SERUM 195 (H) <10 mg/dL     Radiologic Studies:  CT Results  (Last 48 hours)      None          CXR Results  (Last 48 hours)      None          Medications ordered:  Medications   venlafaxine-SR (EFFEXOR-XR) capsule 225 mg (has no administration in time range)   nicotine (NICODERM CQ) 21 mg/24 hr patch 1 Patch (1 Patch TransDERmal Apply Patch 3/6/23 0527)   venlafaxine-SR (EFFEXOR-XR) capsule 75 mg (75 mg Oral Given 3/6/23 0556)       Documentation Comments   - I am the first and primary provider for this patient and am the primary provider of record. - Initial assessment performed. The patients presenting problems have been discussed, and the staff are in agreement with the care plan formulated and outlined with them. I have encouraged them to ask questions as they arise throughout their visit. - Available medical records, nursing notes, old EKGs, and EMS run sheets (if patient was EMS transported) were reviewed    Please note that this dictation was completed with [employer], the [employer] voice recognition software. Quite often unanticipated grammatical, syntax, homophones, and other interpretive errors are inadvertently transcribed by the computer software. Please disregard these errors. Please excuse any errors that have escaped final proofreading.

## 2023-03-07 PROCEDURE — 74011250637 HC RX REV CODE- 250/637: Performed by: PSYCHIATRY & NEUROLOGY

## 2023-03-07 PROCEDURE — 65220000003 HC RM SEMIPRIVATE PSYCH

## 2023-03-07 PROCEDURE — 74011250637 HC RX REV CODE- 250/637: Performed by: STUDENT IN AN ORGANIZED HEALTH CARE EDUCATION/TRAINING PROGRAM

## 2023-03-07 RX ORDER — NICOTINE 7MG/24HR
1 PATCH, TRANSDERMAL 24 HOURS TRANSDERMAL EVERY 24 HOURS
Status: DISCONTINUED | OUTPATIENT
Start: 2023-03-07 | End: 2023-03-08

## 2023-03-07 RX ADMIN — CHLORDIAZEPOXIDE HYDROCHLORIDE 25 MG: 25 CAPSULE ORAL at 21:40

## 2023-03-07 RX ADMIN — CHLORDIAZEPOXIDE HYDROCHLORIDE 25 MG: 25 CAPSULE ORAL at 17:08

## 2023-03-07 RX ADMIN — FOLIC ACID 1 MG: 1 TABLET ORAL at 08:56

## 2023-03-07 RX ADMIN — VENLAFAXINE HYDROCHLORIDE 225 MG: 75 CAPSULE, EXTENDED RELEASE ORAL at 08:56

## 2023-03-07 RX ADMIN — THIAMINE HCL TAB 100 MG 100 MG: 100 TAB at 08:56

## 2023-03-07 RX ADMIN — CHLORDIAZEPOXIDE HYDROCHLORIDE 25 MG: 25 CAPSULE ORAL at 14:20

## 2023-03-07 RX ADMIN — TRAZODONE HYDROCHLORIDE 50 MG: 50 TABLET ORAL at 21:40

## 2023-03-07 RX ADMIN — HYDROXYZINE HYDROCHLORIDE 50 MG: 50 TABLET, FILM COATED ORAL at 08:56

## 2023-03-07 RX ADMIN — CHLORDIAZEPOXIDE HYDROCHLORIDE 25 MG: 25 CAPSULE ORAL at 08:56

## 2023-03-07 NOTE — CONSULTS
Consult    Patient: Violet Schultz MRN: [id number]  SSN: [id number]    YOB: 1995  Age: 24 y.o. Sex: female      Subjective:      Violet Schultz is a 24 y.o. female who is being seen for patient psychiatric admission and medical evaluation history of anemia chronic kidney disease contact dermatitis polycystic ovarian disease.     Past Medical History:   Diagnosis Date    Anemia     Chronic kidney disease     Chronic pain     Contact dermatitis and eczema due to cause     Depression     Liver disease     Polycystic ovarian syndrome      Past Surgical History:   Procedure Laterality Date    HX ADENOIDECTOMY  2001    HX CHOLECYSTECTOMY  10/2022    HX COLONOSCOPY      HX ENDOSCOPY      HX TONSILLECTOMY  2010    HX WISDOM TEETH EXTRACTION        Family History   Problem Relation Age of Onset    Alcohol abuse Father     Diabetes Maternal Grandfather     Other Maternal Grandmother         macular degeneration    Cancer Maternal Grandmother         skin    Depression Mother     Atopy Sister     No Known Problems Brother     No Known Problems Paternal Grandmother     No Known Problems Paternal Grandfather     Heart Disease Neg Hx     Hypertension Neg Hx      Social History     Tobacco Use    Smoking status: Former     Packs/day: 0.50     Years: 8.00     Pack years: 4.00     Types: Cigarettes     Quit date: 2021     Years since quittin.2    Smokeless tobacco: Current   Substance Use Topics    Alcohol use: Yes     Comment: intermittently per pt      Current Facility-Administered Medications   Medication Dose Route Frequency Provider Last Rate Last Admin    venlafaxine-SR (EFFEXOR-XR) capsule 225 mg  225 mg Oral DAILY WITH BREAKFAST Bao Klein MD   225 mg at 23 0856    hydrOXYzine HCL (ATARAX) tablet 50 mg  50 mg Oral TID PRN Jaida Way MD   50 mg at 23 0856    traZODone (DESYREL) tablet 50 mg  50 mg Oral QHS PRN Jaida Way MD   50 mg at 23 2270 acetaminophen (TYLENOL) tablet 650 mg  650 mg Oral Q4H PRN Armand Garrido MD        magnesium hydroxide (MILK OF MAGNESIA) 400 mg/5 mL oral suspension 30 mL  30 mL Oral DAILY PRN Miracle Garrido MD        nicotine (NICODERM CQ) 21 mg/24 hr patch 1 Patch  1 Patch TransDERmal DAILY Ezekiel Ansari MD   1 Patch at 03/07/23 0856    thiamine mononitrate (B-1) tablet 100 mg  100 mg Oral DAILY Ezekiel Ansari MD   100 mg at 78/60/94 0077    folic acid (FOLVITE) tablet 1 mg  1 mg Oral DAILY Miracle Garrido MD   1 mg at 03/07/23 0856    chlordiazePOXIDE (LIBRIUM) capsule 25 mg  25 mg Oral QID Ezekiel Ansari MD   25 mg at 03/07/23 1420        Allergies   Allergen Reactions    Sulfa (Sulfonamide Antibiotics) Anaphylaxis, Hives and Shortness of Breath       Review of Systems:  A comprehensive review of systems was negative except for that written in the History of Present Illness. Objective:     Vitals:    03/06/23 1405 03/06/23 1842 03/06/23 1930 03/07/23 0758   BP: 120/73 126/74 117/67 124/70   Pulse: 100 69 60 (!) 106   Resp: 18 16 14 17   Temp: 98.6 °F (37 °C)  99.1 °F (37.3 °C) 97.6 °F (36.4 °C)   SpO2:   95%    Weight:       Height:            Physical Exam:  General:  Alert, cooperative, no distress, appears stated age. Eyes:  Conjunctivae/corneas clear. PERRL, EOMs intact. Fundi benign   Ears:  Normal TMs and external ear canals both ears. Nose: Nares normal. Septum midline. Mucosa normal. No drainage or sinus tenderness. Mouth/Throat: Lips, mucosa, and tongue normal. Teeth and gums normal.   Neck: Supple, symmetrical, trachea midline, no adenopathy, thyroid: no enlargment/tenderness/nodules, no carotid bruit and no JVD. Back:   Symmetric, no curvature. ROM normal. No CVA tenderness. Lungs:   Clear to auscultation bilaterally. Heart:  Regular rate and rhythm, S1, S2 normal, no murmur, click, rub or gallop. Abdomen:   Soft, non-tender. Bowel sounds normal. No masses,  No organomegaly.    Extremities: Extremities normal, atraumatic, no cyanosis or edema. Pulses: 2+ and symmetric all extremities. Skin: Skin color, texture, turgor normal. No rashes or lesions   Lymph nodes: Cervical, supraclavicular, and axillary nodes normal.   Neurologic: CNII-XII intact. Normal strength, sensation and reflexes throughout. No results found for this or any previous visit (from the past 24 hour(s)). Assessment:     Hospital Problems  Date Reviewed: 1/16/2023            Codes Class Noted POA    Depression ICD-10-CM: F32. A  ICD-9-CM: 256  3/6/2023 Unknown           Plan:     Continue with present    Signed By: Khris Jasso MD     March 7, 2023

## 2023-03-07 NOTE — BH NOTES
DX:  Depression. TDO, to be heard tomorrow 3.8. 23. Patient Gonsalo Good @ 0800 = 6. (Tremors, anxiety- prn administered). Patient Gonsalo Good @ 1700 = 0. Patient received at breakfast, accepts medications as scheduled. Verbalizes understanding of detox medications, states she would like to taper off librium as quickly as possible so that she can go to a rehab facility. She showered in the afternoon. Consumes 50% of meals. She denies thoughts of self harm. Guarded, somewhat preoccupied. Encouraged to seek support from staff as needed. Will continue to monitor on close observation to ensure patient safety and follow treatment plan as written.

## 2023-03-07 NOTE — GROUP NOTE
IP  GROUP DOCUMENTATION INDIVIDUAL                                                                          Group Therapy Note    Date: 3/7/2023    Group Start Time: 1120  Group End Time: 1150  Group Topic: Education Group - Inpatient    SR[facility] 2 [facility] NON ACUTE    Jerie Self    IP [facility] GROUP DOCUMENTATION GROUP    Group Therapy Note    Facilitated group focused on introducing information on learning to complete a thought log to help challenge negative thoughts and develop a more accurate view of a situation to improve mood and behavior    Attendees: 4/8       Attendance: Did not attend    Additional Notes:  Encouraged but did not attend    Danial Joyce, CHANCES
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
## 2023-03-07 NOTE — GROUP NOTE
IP  GROUP DOCUMENTATION INDIVIDUAL                                                                          Group Therapy Note    Date: 3/7/2023    Group Start Time: 8293  Group End Time: 1400  Group Topic: Process Group - Inpatient    [facility] 2 [facility] ACUTE    Eric Query    IP [address] [facility] GROUP DOCUMENTATION GROUP    Group Therapy Note: Facilitator encouraged the group to identify feelings and reviewed CBT. Attendees: 4       Attendance: Attended    Patient's Goal:  To attend groups    Interventions/techniques: Informed, Validated, and Supported    Follows Directions: Followed directions    Interactions: Interacted appropriately    Mental Status: Calm and Depressed    Behavior/appearance: Disheveled, Grooming impaired, Poor eye contact, and Withdrawn/quiet    Goals Achieved: Able to engage in interactions and Able to listen to others      Additional Notes:  Pt is lethargic, detoxing.      Carmelina Barlow

## 2023-03-07 NOTE — BH NOTES
PSA PART II ADDITIONAL INFORMATION        Access To Fire Arms: No    Substance Use: YES    Last Use: 3/6/2023    Type of Substance: Alcohol use    Frequency of Use: 4 times in a month    Request to See : NO    If yes, notified : NO    Guardian:NO    Guardian Contact:n/a    Release of Information Signed: YES    Release of Information Signed For: Mom, grandma, and boyfriend

## 2023-03-07 NOTE — GROUP NOTE
ELISABET  GROUP DOCUMENTATION INDIVIDUAL                                                                          Group Therapy Note    Date: 3/7/2023    Group Start Time: 2485  Group End Time: 1500  Group Topic: Education Group - Inpatient    [facility] 2 [facility] [address] GROUP DOCUMENTATION GROUP    Group Therapy Note: Facilitator provided education on dual diagnosis and the impacts of substance use on mental health. Attendees: 4       Attendance: Attended    Patient's Goal:  To attend groups    Interventions/techniques: Informed, Validated, and Supported    Follows Directions: Followed directions    Interactions: Interacted appropriately    Mental Status: Calm, Depressed, and Preoccupied    Behavior/appearance: Attentive, Cooperative, Poor eye contact, and Withdrawn/quiet    Goals Achieved: Able to engage in interactions and Able to listen to others      Additional Notes:  Pt was guarded and provided minimal input.      Jd Harris

## 2023-03-07 NOTE — PROGRESS NOTES
Progress Note  Date:3/7/2023       Room:[facility]  Patient Name:Estephanie Powers     YOB: 1995     Age:27 y.o. Subjective    Subjective   Pt is laying in bed, describes headache and shaking, feeling overall unwell. She no longer is having active SI, still feels depressed however. She was able to sleep some last night and ate well this morning. Review of Systems    Mental status examination-    Patient is alert, oriented x3   Speech is coherent,minimal conversation, no flight of ideas, no loosening of associations. Casually dressed and groomed  No Active suicidal ideations, plan or intent. No active homicidal ideations plan or intent  No command hallucinations  Thought Processes linear  Not seen responding to any active internal stimuli  No persecutory delusions  Insight limited  Judgement limited    Objective         Vitals Last 24 Hours:  TEMPERATURE:  Temp  Av.4 °F (36.9 °C)  Min: 97.6 °F (36.4 °C)  Max: 99.1 °F (37.3 °C)  RESPIRATIONS RANGE: Resp  Av.3  Min: 14  Max: 18  PULSE OXIMETRY RANGE: SpO2  Av %  Min: 95 %  Max: 95 %  PULSE RANGE: Pulse  Av.8  Min: 60  Max: 106  BLOOD PRESSURE RANGE: Systolic (72OOA), BSF:987 , Min:117 , ARGENIS:415   ; Diastolic (85DLK), VLT:90, Min:67, Max:74    I/O (24Hr): No intake or output data in the 24 hours ending 23 1008  Objective  Labs/Imaging/Diagnostics    Labs:  CBC:  Recent Labs     23   WBC 10.1   RBC 4.56   HGB 13.1   HCT 39.1   MCV 85.7   RDW 14.8*   *     CHEMISTRIES:  Recent Labs     23      K 3.4*   *   CO2 26   BUN 6   CA 8.8   PT/INR:No results for input(s): INR, INREXT in the last 72 hours. No lab exists for component: PROTIME  APTT:No results for input(s): APTT in the last 72 hours.   LIVER PROFILE:  Recent Labs     23   AST 38*   ALT 30     Lab Results   Component Value Date/Time    ALT (SGPT) 30 2023 12:44 AM    AST (SGOT) 38 (H) 03/06/2023 12:44 AM    Alk. phosphatase 89 03/06/2023 12:44 AM    Bilirubin, direct 0.23 02/13/2023 05:13 PM    Bilirubin, total 0.8 03/06/2023 12:44 AM       Imaging Last 24 Hours:  No results found.   Assessment//Plan   Active Problems:    Depression (3/6/2023)      Assessment & Plan  History limited today as she is withdrawing, though she is somewhat improved compared to yesterday  No active SI/HI/AVH  On Librium 25 mg QID to manage withdrawal symptoms  Will continue to monitor to ensure progression in mental status and mood      Current Facility-Administered Medications:     nicotine (NICODERM CQ) 7 mg/24 hr patch 1 Patch, 1 Patch, TransDERmal, Q24H, Miracle Garrido MD, 1 Patch at 03/07/23 1708    venlafaxine-SR (EFFEXOR-XR) capsule 225 mg, 225 mg, Oral, DAILY WITH Bao Merida MD, 225 mg at 03/07/23 0856    hydrOXYzine HCL (ATARAX) tablet 50 mg, 50 mg, Oral, TID PRN, Gracie Amador MD, 50 mg at 03/07/23 0856    traZODone (DESYREL) tablet 50 mg, 50 mg, Oral, QHS PRN, Gracie Amador MD, 50 mg at 03/06/23 2135    acetaminophen (TYLENOL) tablet 650 mg, 650 mg, Oral, Q4H PRN, Miracle Garrido MD    magnesium hydroxide (MILK OF MAGNESIA) 400 mg/5 mL oral suspension 30 mL, 30 mL, Oral, DAILY PRN, Miracle Garrido MD    thiamine mononitrate (B-1) tablet 100 mg, 100 mg, Oral, DAILY, Miracle Garrido MD, 100 mg at 72/29/54 5436    folic acid (FOLVITE) tablet 1 mg, 1 mg, Oral, DAILY, Miracle Garrido MD, 1 mg at 03/07/23 0856    chlordiazePOXIDE (LIBRIUM) capsule 25 mg, 25 mg, Oral, QID, Gracie Amador MD, 25 mg at 03/07/23 1708       Electronically signed by Coreen Fernandez MD on 3/7/2023 at 10:08 AM

## 2023-03-07 NOTE — GROUP NOTE
IP  GROUP DOCUMENTATION INDIVIDUAL                                                                          Group Therapy Note    Date: 3/7/2023    Group Start Time: 1525  Group End Time: 8071  Group Topic: Recreational/Music Therapy    SRM 2  NON ACUTE    Keshia Martinez    IP [address] [facility] GROUP DOCUMENTATION GROUP    Group Therapy Note    Facilitated leisure skills group to reinforce positive coping and to manage mood through music, social interaction, group activities and art task    Attendees: 3/8       Attendance: Did not attend    Additional Notes:  Encouraged but did not attend    Ange Walden, [address]

## 2023-03-08 PROCEDURE — 74011250637 HC RX REV CODE- 250/637: Performed by: PSYCHIATRY & NEUROLOGY

## 2023-03-08 PROCEDURE — 65220000003 HC RM SEMIPRIVATE PSYCH

## 2023-03-08 PROCEDURE — 74011250637 HC RX REV CODE- 250/637: Performed by: STUDENT IN AN ORGANIZED HEALTH CARE EDUCATION/TRAINING PROGRAM

## 2023-03-08 RX ORDER — CHLORDIAZEPOXIDE HYDROCHLORIDE 10 MG/1
10 CAPSULE, GELATIN COATED ORAL 4 TIMES DAILY
Status: DISCONTINUED | OUTPATIENT
Start: 2023-03-08 | End: 2023-03-09

## 2023-03-08 RX ORDER — NICOTINE 7MG/24HR
1 PATCH, TRANSDERMAL 24 HOURS TRANSDERMAL DAILY
Status: DISCONTINUED | OUTPATIENT
Start: 2023-03-08 | End: 2023-03-10 | Stop reason: HOSPADM

## 2023-03-08 RX ADMIN — TRAZODONE HYDROCHLORIDE 50 MG: 50 TABLET ORAL at 21:20

## 2023-03-08 RX ADMIN — CHLORDIAZEPOXIDE HYDROCHLORIDE 10 MG: 5 CAPSULE ORAL at 21:20

## 2023-03-08 RX ADMIN — FOLIC ACID 1 MG: 1 TABLET ORAL at 08:59

## 2023-03-08 RX ADMIN — CHLORDIAZEPOXIDE HYDROCHLORIDE 10 MG: 5 CAPSULE ORAL at 18:04

## 2023-03-08 RX ADMIN — CHLORDIAZEPOXIDE HYDROCHLORIDE 25 MG: 25 CAPSULE ORAL at 08:59

## 2023-03-08 RX ADMIN — THIAMINE HCL TAB 100 MG 100 MG: 100 TAB at 08:59

## 2023-03-08 RX ADMIN — CHLORDIAZEPOXIDE HYDROCHLORIDE 10 MG: 5 CAPSULE ORAL at 13:43

## 2023-03-08 RX ADMIN — VENLAFAXINE HYDROCHLORIDE 225 MG: 75 CAPSULE, EXTENDED RELEASE ORAL at 08:59

## 2023-03-08 NOTE — BH NOTES
TDO Disposition     Pt was seen by Francine Jaramillo and represented by Mr Jean-Baptiste.  Pt was committed for up to 5 days

## 2023-03-08 NOTE — PROGRESS NOTES
Problem: Depressed Mood (Adult/Pediatric)  Goal: *STG: Remains safe in hospital  Outcome: Progressing Towards Goal  Goal: *STG: Complies with medication therapy  Outcome: Progressing Towards Goal     Problem: Falls - Risk of  Goal: *Absence of Falls  Description: Document Melyssa Fall Risk and appropriate interventions in the flowsheet. Outcome: Progressing Towards Goal  Note: Fall Risk Interventions:    -the pt remains safe; no self injurious behavior noted or reported.  -the pt is med compliant.  -the pt ambulates independently; no falls noted or reported.

## 2023-03-08 NOTE — PROGRESS NOTES
27 years  Visit Vitals  /75   Pulse 98   Temp 97.6 °F (36.4 °C)   Resp 18   Ht 5' 6\" (1.676 m)   Wt 65.3 kg (144 lb)   SpO2 100%   BMI 23.24 kg/m²       Current Facility-Administered Medications:     nicotine (NICODERM CQ) 7 mg/24 hr patch 1 Patch, 1 Patch, TransDERmal, DAILY, Miracle Garrido MD, 1 Patch at 03/08/23 0956    chlordiazePOXIDE (LIBRIUM) capsule 10 mg, 10 mg, Oral, QID, Miracle Garrido MD, 10 mg at 03/08/23 1343    venlafaxine-SR (EFFEXOR-XR) capsule 225 mg, 225 mg, Oral, DAILY WITH BREAKFAST, Bao Klein MD, 225 mg at 03/08/23 0859    hydrOXYzine HCL (ATARAX) tablet 50 mg, 50 mg, Oral, TID PRN, Jaida Way MD, 50 mg at 03/07/23 0856    traZODone (DESYREL) tablet 50 mg, 50 mg, Oral, QHS PRN, Jaida Way MD, 50 mg at 03/07/23 2140    acetaminophen (TYLENOL) tablet 650 mg, 650 mg, Oral, Q4H PRN, Miracle Garrido MD    magnesium hydroxide (MILK OF MAGNESIA) 400 mg/5 mL oral suspension 30 mL, 30 mL, Oral, DAILY PRN, Miracle Garrido MD    thiamine mononitrate (B-1) tablet 100 mg, 100 mg, Oral, DAILY, Miracle Garrido MD, 100 mg at 02/62/19 3261    folic acid (FOLVITE) tablet 1 mg, 1 mg, Oral, DAILY, Miracle Garrido MD, 1 mg at 03/08/23 3811

## 2023-03-08 NOTE — GROUP NOTE
ELISABET  GROUP DOCUMENTATION INDIVIDUAL                                                                          Group Therapy Note    Date: 3/8/2023    Group Start Time: 1400  Group End Time: 1430  Group Topic: Process Group - Inpatient    SRM 2 BEHA HL[facility] ACUTE    Sadie Hardy    IP [address] [facility] GROUP DOCUMENTATION GROUP    Group Therapy Note    Attendees: 5/8    Process: pts encouraged to come to group, only one person attended in person. Writer went to pt rooms individually providing them with information over protective factors, discussed it with them if willing. Pts were encouraged to fill out paperwork and reflect.         Attendance: Did not attend  Additional Notes:  pt was encouraged to attend but chose not to do so     [facility]

## 2023-03-08 NOTE — BH NOTES
Behavioral Health Treatment Team Note     Patient goal(s) for today: 'get ready to go to rehab'  Treatment team focus/goals: continue medication management, group therapy, maintain ADLS, work on safe discharge plan    Progress note: Pt presents with a flat affect and congruent mood. PT denies SI/HI/AVH at this time. Pt is neatly groomed, oriented x4. Pt is pleasant, polite and cooperative. Pt is excited to go to rehab. Pt reports that her parents told her she has to go to rehab and the pt reports that she had 68 days of sobriety before relapsing leading to admission. Writer spoke about the importance of following through with rehab to have long term success for pt. Pt still needs an inpatient level of care due to high risk of relapse and needing a safe discharge plan    Writer spoke to pts mother, Luciano Fiore and informed her that rehab can pick her up Friday morning at 10:30a. Luciano Fiore grateful to know this and said that pts boyfriend will be dropping off a bag of clothes for her this evening      [facility]: Pt is cleared and ready to go to rehab. They are able to pick her up on 3/10/23 at 10:30a.  Nurses station number provided for  to call upon arrival    LOS:  2  Expected LOS: TDO until 3/12    Insurance info/prescription coverage:  /[place] of [place]   Date of last family contact:  today  Family requesting physician contact today:  no  Discharge plan:  will be going to inpatient substance use treatment   Guns in the home:  no   Outpatient provider(s):  [facility]    Participating treatment team members: Mylene Medrano, * (assigned SW), Sara Payne, NICK

## 2023-03-08 NOTE — BH NOTES
The pt has been resting quietly in bed throughout most of the evening. She was up in the day room for a short time talking on the phone. She rated her anxiety a 6/10 and her depression a 5/10. She denies having any SI, HI, or A/VH. Her CIWA score is a 6 r/t to her anxiety, feeling mild tremors and cool sweats. Pt has been calm and pleasant. She has a flat/sad affect. She isolates to her room and appears to be withdrawn. Pt given two article of clothing that was brought to the ED, along with a note. Items searched and listed on the belonging sheet. The pt is med complaint and received prn Trazodone for sleep. The pt remains A+O and ambulates independently. No c/o pain or discomfort. The pt stated that she has not been getting her Gabapentin or Klonopin. She gets her medications form the [facility] in [place]. Pt informed that the medications will need to be verified in the am.    The pt has been resting quietly in bed w/o and distress. Respirations are quiet and unlabored. She remains on close observation for safety.

## 2023-03-08 NOTE — BH NOTES
DX:  Depression, with alcohol abuse. Committed, today, up to 5 days. Plans for discharge on Friday to rehab in [place]. Patient optimistic and bright affect regarding rehab. CIWAA 0. Accepts medications as scheduled with no side effects reported. Librium was decreased to 10mg. Patient denies thoughts of lethality to self and others. Hygiene is good. Up for meals, consumes 75%. Encouraged to seek support from staff as needed. Will continue to monitor on close observation to ensure patient safety and follow treatment plan as written.

## 2023-03-08 NOTE — PROGRESS NOTES
Progress Note  Date:3/8/2023       Room:[facility]  Patient Name:Estephanie Powers     YOB: 1995     Age:27 y.o. Subjective    Subjective   Pt sleeping initially, wakes to light verbal stimuli. She states she is feeling generally better, with improvement in her shaking and headaches, though she now has some slight sense of vertigo since being started on Librium. She also mentions that she is lined up to go to a rehab facility in [place] and that she wishes to leave as soon as possible so she can go there. No SI/HI/AVH. She has been sleeping and eating well. Review of Systems  Mental status examination-    Patient is alert, oriented x3   Speech is coherent, moderate volume, no flight of ideas, no loosening of associations. Casually dressed and groomed  No Active suicidal ideations, plan or intent. No active homicidal ideations plan or intent  No command hallucinations  Thought Processes linear  Not seen responding to any active internal stimuli  No persecutory delusions  Insight limited  Judgement limited    Objective         Vitals Last 24 Hours:  TEMPERATURE:  Temp  Av.1 °F (36.7 °C)  Min: 97.6 °F (36.4 °C)  Max: 98.6 °F (37 °C)  RESPIRATIONS RANGE: Resp  Av  Min: 18  Max: 18  PULSE OXIMETRY RANGE: SpO2  Av %  Min: 100 %  Max: 100 %  PULSE RANGE: Pulse  Av  Min: 98  Max: 114  BLOOD PRESSURE RANGE: Systolic (63HVF), EBD:943 , Min:108 , TKR:745   ; Diastolic (72OHQ), JCM:13, Min:65, Max:75    I/O (24Hr): No intake or output data in the 24 hours ending 23 1209  Objective  Labs/Imaging/Diagnostics    Labs:  CBC:  Recent Labs     23  0044   WBC 10.1   RBC 4.56   HGB 13.1   HCT 39.1   MCV 85.7   RDW 14.8*   *     CHEMISTRIES:  Recent Labs     23  0044      K 3.4*   *   CO2 26   BUN 6   CA 8.8   PT/INR:No results for input(s): INR, INREXT in the last 72 hours.     No lab exists for component: PROTIME  APTT:No results for input(s): APTT in the last 72 hours. LIVER PROFILE:  Recent Labs     03/06/23  0044   AST 38*   ALT 30     Lab Results   Component Value Date/Time    ALT (SGPT) 30 03/06/2023 12:44 AM    AST (SGOT) 38 (H) 03/06/2023 12:44 AM    Alk. phosphatase 89 03/06/2023 12:44 AM    Bilirubin, direct 0.23 02/13/2023 05:13 PM    Bilirubin, total 0.8 03/06/2023 12:44 AM       Imaging Last 24 Hours:  No results found.   Assessment//Plan   Active Problems:    Depression (3/6/2023)      Assessment & Plan  Will taper Librium dosage, today to 10 mg TID  Continue monitoring for improvement of mood and mentation  Monitor closely for worsening withdrawal symptoms or seizure activity    Electronically signed by Idalia Castano on 3/8/2023 at 12:09 PM

## 2023-03-08 NOTE — GROUP NOTE
IP  GROUP DOCUMENTATION INDIVIDUAL                                                                          Group Therapy Note    Date: 3/8/2023    Group Start Time: 1115  Group End Time: 1569  Group Topic: Education Group - Inpatient    SRM 2  NON ACUTE    [address] GROUP DOCUMENTATION GROUP    Group Therapy Note  Psych-Ed was focused on implementing a safety plan for pts discharge. Writer provided a safety plan with 50 ways of self care. Writer went over each step with the pts educating them on the importance of having one. Pts were pleasant and provided encouragement for one another. They appeared to have a difficult time understanding the coping skills and triggers. Writer had to continue to redirect the pts and re-explain the topic of the safety plan.    Attendees: 4       Attendance: Did not attend      Additional Notes:  Pt was encouraged to attend and chose not to     [facility]

## 2023-03-08 NOTE — BH NOTES
Amanda Minaya at [employer] / [facility] verified RX  Clonidine 0.1mg BID last filled 2.3.23  Gabapentin 100 mg BID last filled 1.22.23     LVM for Dr. Rebecca Paul @ [phone], awaiting callback.

## 2023-03-09 PROCEDURE — 74011250637 HC RX REV CODE- 250/637: Performed by: PSYCHIATRY & NEUROLOGY

## 2023-03-09 PROCEDURE — 74011250637 HC RX REV CODE- 250/637: Performed by: STUDENT IN AN ORGANIZED HEALTH CARE EDUCATION/TRAINING PROGRAM

## 2023-03-09 PROCEDURE — 65220000003 HC RM SEMIPRIVATE PSYCH

## 2023-03-09 RX ORDER — CHLORDIAZEPOXIDE HYDROCHLORIDE 5 MG/1
5 CAPSULE, GELATIN COATED ORAL 3 TIMES DAILY
Status: DISCONTINUED | OUTPATIENT
Start: 2023-03-09 | End: 2023-03-10

## 2023-03-09 RX ADMIN — CHLORDIAZEPOXIDE HYDROCHLORIDE 5 MG: 5 CAPSULE ORAL at 21:13

## 2023-03-09 RX ADMIN — FOLIC ACID 1 MG: 1 TABLET ORAL at 08:08

## 2023-03-09 RX ADMIN — THIAMINE HCL TAB 100 MG 100 MG: 100 TAB at 08:08

## 2023-03-09 RX ADMIN — CHLORDIAZEPOXIDE HYDROCHLORIDE 5 MG: 5 CAPSULE ORAL at 15:40

## 2023-03-09 RX ADMIN — CHLORDIAZEPOXIDE HYDROCHLORIDE 10 MG: 5 CAPSULE ORAL at 08:08

## 2023-03-09 RX ADMIN — HYDROXYZINE HYDROCHLORIDE 50 MG: 50 TABLET, FILM COATED ORAL at 21:13

## 2023-03-09 RX ADMIN — VENLAFAXINE HYDROCHLORIDE 225 MG: 75 CAPSULE, EXTENDED RELEASE ORAL at 08:08

## 2023-03-09 RX ADMIN — TRAZODONE HYDROCHLORIDE 50 MG: 50 TABLET ORAL at 21:13

## 2023-03-09 NOTE — GROUP NOTE
ELISABET  GROUP DOCUMENTATION INDIVIDUAL                                                                          Group Therapy Note    Date: 3/9/2023    Group Start Time: 1115  Group End Time: 1200  Group Topic: Process Group - Inpatient    SRM 2 [facility]HA [facility] ACUTE    Sadie Hardy    IP [address] [facility] GROUP DOCUMENTATION GROUP    Group Therapy Note    Attendees: 5/7    Process: Pts were asked to choose a colour as a check in question. Pts were then encouraged to reflect on the viscous cycle worksheet where they identified unhealthy behaviours from a result of their depression/anxiety/substance use and what feelings that leads to. Pts were encouraged to share how they cope with their varying issues and provide positive feedback to one another. Pts were then asked to reflect on group        Attendance: Attended    Patient's Goal:  attend activities and groups     Interventions/techniques: Validated, Promoted peer support, Provide feedback, and Supported    Follows Directions: Followed directions    Interactions: Interacted appropriately    Mental Status: Calm, Congruent, and Flat    Behavior/appearance: Attentive, Motivated, Neatly groomed, and Withdrawn/quiet    Goals Achieved: Able to engage in interactions and Able to listen to others      Additional Notes:  pt spoke about how she will be going to rehab and that she is hopeful for the future.  Pt is making progress towards goals by attending and participating in group     [facility]

## 2023-03-09 NOTE — GROUP NOTE
IP BH GROUP DOCUMENTATION INDIVIDUAL                                                                          Group Therapy Note    Date: 3/9/2023    Group Start Time: 0940  Group End Time: 4613  Group Topic: Education Group - Inpatient    SRM 2 BH NON ACUTE    Ethyl Breeding    IP [address] GROUP DOCUMENTATION GROUP    Group Therapy Note    Facilitated group to focus on exploring values and utilized values discussion questions    Attendees: 5/8       Attendance: Did not attend    Additional Notes:  Encouraged but did not attend    Leonela Clements, CTRS

## 2023-03-09 NOTE — BH NOTES
Alert and oriented x 4. Affect and mood are flat ad depressed. Deacon SI/HI a this time. No AVH noted or reported. Rates depression and anxiety 3/10. Verbalizes reluctance about going to inpatient rehab tomorrow but has the insight to know that she needs to go. Medication and meal compliant.

## 2023-03-09 NOTE — PROGRESS NOTES
Problem: Depressed Mood (Adult/Pediatric)  Goal: *STG: Verbalizes anger, guilt, and other feelings in a constructive manor  Outcome: Progressing Towards Goal     Problem: Depressed Mood (Adult/Pediatric)  Goal: *STG: Attends activities and groups  Outcome: Progressing Towards Goal     Problem: Depressed Mood (Adult/Pediatric)  Goal: *STG: Demonstrates reduction in symptoms and increase in insight into coping skills/future focused  Outcome: Progressing Towards Goal     Problem: Depressed Mood (Adult/Pediatric)  Goal: *STG: Remains safe in hospital  Outcome: Progressing Towards Goal     Problem: Depressed Mood (Adult/Pediatric)  Goal: *STG: Complies with medication therapy  Outcome: Progressing Towards Goal

## 2023-03-09 NOTE — BH NOTES
Shift Note:    Ms. Julia Lance was alert and oriented times 4 at the onset of the shift in the group room attending music group. She talked on the phone occasionally and presented pleasant with good insight and reasoning. She stated that she was willing to go to treatment for alcohol addiction even though she does not want to be in treatment. She was able to reason with this nurse that she is unable to control her alcohol consumption on her own and needs intervention. She denied thoughts to harm herself or others. She stated endorsed mild anxiety at 1 and mild hand tremors with a CIWA of 3. She accepted her medication as prescribed and requested Trazodone as a sleep aid. She is currently in bed with her eyes closed with even and unlabored breathing. Staff will continue to monitor on every 15 minute checks.

## 2023-03-09 NOTE — BH NOTES
Behavioral Health Treatment Team Note     Patient goal(s) for today: 'go to groups'  Treatment team focus/goals: continue medication management, group therapy, maintain ADLS, work on safe discharge plan    Progress note: Pt presents with a flat affect and congruent mood. Pt denies SI/HI/AVH at this time. Pt is semi tidy, oriented x4. Pt had some more appropriate questions regarding the facility. Writer answered them to the best of their ability and provided pt with printout about the facility. Pt reports she is nervous because she wants to have access to a computer and her phone but also understands that this is important for her to recovery. Pt is happy to be leaving tomorrow morning.  Pt still needs an inpatient level of care due to high risk of relapse and needing a safe discharge plan    Pt will be picked up tomorrow morning at 10:30a by [facility]rb[facility]    LOS:  3  Expected LOS: TDO until 3/23    Insurance info/prescription coverage:  /[place] of [place]   Date of last family contact:  3.8.23  Family requesting physician contact today:  no  Discharge plan:  will go to inpatient substance use treatment   Guns in the home:  no   [facility] provider[facility]     Participating treatment team members: Winston Atkins, * (assigned SW), NICK Handy

## 2023-03-09 NOTE — PROGRESS NOTES
Problem: Depressed Mood (Adult/Pediatric)  Goal: *STG: Demonstrates reduction in symptoms and increase in insight into coping skills/future focused  Outcome: Progressing Towards Goal  Goal: *STG: Remains safe in hospital  Outcome: Progressing Towards Goal  Goal: *STG: Complies with medication therapy  Outcome: Progressing Towards Goal     Problem: Chemical Dependency (Adult/Pediatric)  Goal: *STG: Participates in treatment plan  Outcome: Progressing Towards Goal  Goal: *STG: Remains safe in hospital  Outcome: Progressing Towards Goal  Goal: *STG: Seeks staff when symptoms of withdrawal increase  Outcome: Progressing Towards Goal  Goal: *STG: Complies with medication therapy  Outcome: Progressing Towards Goal

## 2023-03-09 NOTE — GROUP NOTE
IP  GROUP DOCUMENTATION INDIVIDUAL                                                                          Group Therapy Note    Date: 3/8/2023    Group Start Time: 2332  Group End Time: 1930  Group Topic: Recreational/Music Therapy    SRM 2 BH NON ACUTE    Micaela Lands    IP [address] [facility] GROUP DOCUMENTATION GROUP    Group Therapy Note    Attendees: 6/8    Facilitated structured leisure skills group to introduce healthy leisure skills as positive way to cope and manage mood. Attendance: Attended    Patient's Goal:  STG: Attends activities and groups      Interventions/techniques: Art integration and Supported    Follows Directions: Followed directions    Interactions: Interacted appropriately    Mental Status: Calm and Flat    Behavior/appearance: Cooperative    Goals Achieved: Able to engage in interactions and Able to listen to others      Additional Notes: Attended group and listened to songs with peers. Was receptive to intervention and responded to prompts from staff. Attended entire session and was attentive during group.      Terry Suarez, CHANCES

## 2023-03-09 NOTE — PROGRESS NOTES
Progress Note  Date:3/8/2023       Room:[facility]  Patient Name:Estephanie Powers     YOB: 1995     Age:27 y.o. Subjective    Subjective   Patient has been tolerating medications. She still presents shaky and tremulous this morning. Which she has been up and out of her room. Her appetite has been improving. She still feels weak but feels that she is ready to go to rehab. She also mentions that she is lined up to go to a rehab facility in [place] and that she wishes to leave as soon as possible so she can go there. No SI/HI/AVH. Review of Systems  Objective         Vitals Last 24 Hours:  TEMPERATURE:  Temp  Av °F (36.7 °C)  Min: 97.6 °F (36.4 °C)  Max: 98.4 °F (36.9 °C)  RESPIRATIONS RANGE: Resp  Av  Min: 18  Max: 20  PULSE OXIMETRY RANGE: SpO2  Av %  Min: 100 %  Max: 100 %  PULSE RANGE: Pulse  Av  Min: 98  Max: 108  BLOOD PRESSURE RANGE: Systolic (67SYP), UKS:156 , Min:120 , BOX:533   ; Diastolic (54CKZ), BELEN:91, Min:75, Max:85    I/O (24Hr): No intake or output data in the 24 hours ending 23  Objective  Labs/Imaging/Diagnostics    Labs:  CBC:  Recent Labs     23   WBC 10.1   RBC 4.56   HGB 13.1   HCT 39.1   MCV 85.7   RDW 14.8*   *     CHEMISTRIES:  Recent Labs     23      K 3.4*   *   CO2 26   BUN 6   CA 8.8   PT/INR:No results for input(s): INR, INREXT in the last 72 hours. No lab exists for component: PROTIME  APTT:No results for input(s): APTT in the last 72 hours. LIVER PROFILE:  Recent Labs     23   AST 38*   ALT 30     Lab Results   Component Value Date/Time    ALT (SGPT) 30 2023 12:44 AM    AST (SGOT) 38 (H) 2023 12:44 AM    Alk. phosphatase 89 2023 12:44 AM    Bilirubin, direct 0.23 2023 05:13 PM    Bilirubin, total 0.8 2023 12:44 AM       Imaging Last 24 Hours:  No results found.   Assessment//Plan   Active Problems:    Depression (3/6/2023)      Assessment & Plan  Decrease Librium to 10 mg 4 times daily continue to closely monitor for any withdrawal symptoms worsening  Patient had TDO hearing this morning.   Provided support and encourage participation in therapy to address underlying depression suicidal ideations, alcohol abuse  Patient discussed in treatment team meeting  Patient is planned to go to inpatient rehab this Friday    Current Facility-Administered Medications:     nicotine (NICODERM CQ) 7 mg/24 hr patch 1 Patch, 1 Patch, TransDERmal, DAILY, Miracle Garrido MD, 1 Patch at 03/08/23 0956    chlordiazePOXIDE (LIBRIUM) capsule 10 mg, 10 mg, Oral, QID, Miracle Garrido MD, 10 mg at 03/08/23 1804    venlafaxine-SR (EFFEXOR-XR) capsule 225 mg, 225 mg, Oral, DAILY WITH Bao Mendoza MD, 225 mg at 03/08/23 0859    hydrOXYzine HCL (ATARAX) tablet 50 mg, 50 mg, Oral, TID PRN, Jimmy Best MD, 50 mg at 03/07/23 0856    traZODone (DESYREL) tablet 50 mg, 50 mg, Oral, QHS PRN, Jimmy Best MD, 50 mg at 03/07/23 2140    acetaminophen (TYLENOL) tablet 650 mg, 650 mg, Oral, Q4H PRN, Miracle Garrido MD    magnesium hydroxide (MILK OF MAGNESIA) 400 mg/5 mL oral suspension 30 mL, 30 mL, Oral, DAILY PRN, Miracle Garrido MD    thiamine mononitrate (B-1) tablet 100 mg, 100 mg, Oral, DAILY, Miracle Garrido MD, 100 mg at 81/42/21 8248    folic acid (FOLVITE) tablet 1 mg, 1 mg, Oral, DAILY, Jimmy Best MD, 1 mg at 03/08/23 9867   Electronically signed by Saul Reddy MD on 3/8/2023 at 9:14 PM

## 2023-03-09 NOTE — GROUP NOTE
IP  GROUP DOCUMENTATION INDIVIDUAL                                                                          Group Therapy Note    Date: 3/9/2023    Group Start Time: 3221  Group End Time: 9224  Group Topic: Recreational/Music Therapy    [facility] 2  NON ACUTE    Kathleen oMnae    IP [address] [facility] GROUP DOCUMENTATION GROUP    Group Therapy Note    Facilitated leisure skills group to reinforce positive coping and to manage mood through music, social interaction, group activities and art task    Attendees: 5/7       Attendance: Attended    Patient's Goal:  Attend group daily     Interventions/techniques: Art integration and Supported    Follows Directions: Followed directions    Interactions: Interacted appropriately    Mental Status: Calm    Behavior/appearance: Cooperative    Goals Achieved: Able to engage in interactions and Able to listen to others      Additional Notes:  Receptive to listening to music while working on leisure task.  Interacted with staff when prompted     SHRADDHA Lovell

## 2023-03-10 VITALS
DIASTOLIC BLOOD PRESSURE: 78 MMHG | RESPIRATION RATE: 18 BRPM | WEIGHT: 144 LBS | HEIGHT: 66 IN | SYSTOLIC BLOOD PRESSURE: 122 MMHG | OXYGEN SATURATION: 100 % | TEMPERATURE: 98.3 F | HEART RATE: 94 BPM | BODY MASS INDEX: 23.14 KG/M2

## 2023-03-10 PROCEDURE — 74011250637 HC RX REV CODE- 250/637: Performed by: STUDENT IN AN ORGANIZED HEALTH CARE EDUCATION/TRAINING PROGRAM

## 2023-03-10 PROCEDURE — 74011250637 HC RX REV CODE- 250/637: Performed by: PSYCHIATRY & NEUROLOGY

## 2023-03-10 RX ORDER — VENLAFAXINE HYDROCHLORIDE 75 MG/1
225 CAPSULE, EXTENDED RELEASE ORAL
Qty: 30 CAPSULE | Refills: 0 | Status: SHIPPED | OUTPATIENT
Start: 2023-03-11

## 2023-03-10 RX ORDER — TRAZODONE HYDROCHLORIDE 50 MG/1
50 TABLET ORAL
Qty: 30 TABLET | Refills: 1 | Status: SHIPPED | OUTPATIENT
Start: 2023-03-10

## 2023-03-10 RX ORDER — VENLAFAXINE HYDROCHLORIDE 75 MG/1
75 CAPSULE, EXTENDED RELEASE ORAL DAILY
Qty: 30 CAPSULE | Refills: 1 | Status: SHIPPED | OUTPATIENT
Start: 2023-03-10

## 2023-03-10 RX ORDER — VENLAFAXINE HYDROCHLORIDE 150 MG/1
150 CAPSULE, EXTENDED RELEASE ORAL DAILY
Qty: 30 CAPSULE | Refills: 1 | Status: SHIPPED | OUTPATIENT
Start: 2023-03-10

## 2023-03-10 RX ADMIN — VENLAFAXINE HYDROCHLORIDE 225 MG: 75 CAPSULE, EXTENDED RELEASE ORAL at 09:27

## 2023-03-10 RX ADMIN — FOLIC ACID 1 MG: 1 TABLET ORAL at 09:24

## 2023-03-10 RX ADMIN — THIAMINE HCL TAB 100 MG 100 MG: 100 TAB at 09:24

## 2023-03-10 RX ADMIN — CHLORDIAZEPOXIDE HYDROCHLORIDE 5 MG: 5 CAPSULE ORAL at 09:24

## 2023-03-10 NOTE — BH NOTES
DISCHARGE SUMMARY    NAME:Estephanie Keenan  : 1995  MRN: [id number]    The patient Raffy Garcia exhibits the ability to control behavior in a less restrictive environment. Patient's level of functioning is improving. No assaultive/destructive behavior has been observed for the past 24 hours. No suicidal/homicidal threat or behavior has been observed for the past 24 hours. There is no evidence of serious medication side effects. Patient has not been in physical or protective restraints for at least the past 24 hours. If weapons involved, how are they secured? N/a    Is patient aware of and in agreement with discharge plan? yes    Arrangements for medication:  Prescriptions will be provided by paper    Copy of discharge instructions to provider?:  yes    Arrangements for transportation home:  will be picked up by [facility] at 10:30a    Keep all follow up appointments as scheduled, continue to take prescribed medications per physician instructions.   Mental health crisis number:  [phone] or your local mental health crisis line number at [place] [address] Emergency WARM LINE      [phone] ([phone])      M-F: 9am to 9pm      Sat & Sun: 5pm - 9pm  National suicide prevention lines:                             9-942-YKPQWTH ([phone])       [phone]-TALK ([phone])    Crisis Text Line:  Text HOME to [phone]

## 2023-03-10 NOTE — GROUP NOTE
ELISABET  GROUP DOCUMENTATION INDIVIDUAL                                                                          Group Therapy Note    Date: 3/10/2023    Group Start Time: 0935  Group End Time: 1020  Group Topic: Education Group - Inpatient    [facility] 2  NON ACUTE    Ralph Shirley    IP [address] [facility] GROUP DOCUMENTATION GROUP    Group Therapy Note    Facilitated discussion on  stress exploration focused on  being able to identify daily hassles, major life changes and life circumstances that contribute to stress and also identify daily uplifts, healthy coping strategies and protective factors that counteract stress    Attendees: 3/7       Attendance: Did not attend    Additional Notes:  Encouraged but did not attend    CHANCE AlbertS

## 2023-03-10 NOTE — BH NOTES
NIGHT SHIFT    Pt in room, reading quietly in bed. Pt cooperative and pleasant upon approach. Pt denies any depression but reports 7/10 anxiety about going to rehab tomorrow. Pt states her librium helps with anxiety but also wants a PRN if she has anything, pt educated that she does have atarax and this writer will bring with other nighttime medication. Pt deneis SI/HI. Deneis AVH. Pt reports nausea, but states its due to anxiety and not due to withdrawal, no other withdraw symptoms seen or reported by pt. Continue close observations to ensure pt safety.

## 2023-03-10 NOTE — BH NOTES
Behavioral Health Transition Record to Provider    Patient Name: Shamir Meng  YOB: 1995  Medical Record Number: [id number]  Date of Admission: 3/6/2023  Date of Discharge: 3.10.23    Attending Provider: Darby Gotti MD  Discharging Provider: Dr Ancelmo Darnell   To contact this individual call [phone] and ask the  to page. If unavailable, ask to be transferred to [facility] Provider on call. [facility] Provider will be available on call 24/7 and during holidays. Primary Care Provider: Shiraz Peres MD    Allergies   Allergen Reactions    Sulfa (Sulfonamide Antibiotics) Anaphylaxis, Hives and Shortness of Breath       Reason for Admission: Pt was admitted for alcohol intoxication and suicidal ideation with a plan to overdose. Pt has been struggling with alcohol abuse for approximately 5 years     Admission Diagnosis: Depression [F32. A]    * No surgery found *    Results for orders placed or performed during the hospital encounter of 03/06/23   COVID-19 WITH INFLUENZA A/B   Result Value Ref Range    SARS-CoV-2 by PCR Not Detected Not Detected      Influenza A by PCR Not Detected Not Detected      Influenza B by PCR Not Detected Not Detected     CBC WITH AUTOMATED DIFF   Result Value Ref Range    WBC 10.1 3.6 - 11.0 K/uL    RBC 4.56 3.80 - 5.20 M/uL    HGB 13.1 11.5 - 16.0 g/dL    HCT 39.1 35.0 - 47.0 %    MCV 85.7 80.0 - 99.0 FL    MCH 28.7 26.0 - 34.0 PG    MCHC 33.5 30.0 - 36.5 g/dL    RDW 14.8 (H) 11.5 - 14.5 %    PLATELET 065 (L) 654 - 400 K/uL    MPV 9.6 8.9 - 12.9 FL    NRBC 0.0 0.0  WBC    ABSOLUTE NRBC 0.00 0.00 - 0.01 K/uL    NEUTROPHILS 60 32 - 75 %    LYMPHOCYTES 31 12 - 49 %    MONOCYTES 7 5 - 13 %    EOSINOPHILS 1 0 - 7 %    BASOPHILS 1 0 - 1 %    IMMATURE GRANULOCYTES 0 0 - 0.5 %    ABS. NEUTROPHILS 6.1 1.8 - 8.0 K/UL    ABS. LYMPHOCYTES 3.1 0.8 - 3.5 K/UL    ABS. MONOCYTES 0.7 0.0 - 1.0 K/UL    ABS. EOSINOPHILS 0.1 0.0 - 0.4 K/UL    ABS.  BASOPHILS 0.1 0.0 - 0.1 K/UL    ABS. IMM. GRANS. 0.0 0.00 - 0.04 K/UL    DF AUTOMATED     METABOLIC PANEL, COMPREHENSIVE   Result Value Ref Range    Sodium 140 136 - 145 mmol/L    Potassium 3.4 (L) 3.5 - 5.1 mmol/L    Chloride 109 (H) 97 - 108 mmol/L    CO2 26 21 - 32 mmol/L    Anion gap 5 5 - 15 mmol/L    Glucose 167 (H) 65 - 100 mg/dL    BUN 6 6 - 20 mg/dL    Creatinine 0.69 0.55 - 1.02 mg/dL    BUN/Creatinine ratio 9 (L) 12 - 20      eGFR >60 >60 ml/min/1.73m2    Calcium 8.8 8.5 - 10.1 mg/dL    Bilirubin, total 0.8 0.2 - 1.0 mg/dL    AST (SGOT) 38 (H) 15 - 37 U/L    ALT (SGPT) 30 12 - 78 U/L    Alk.  phosphatase 89 45 - 117 U/L    Protein, total 7.6 6.4 - 8.2 g/dL    Albumin 3.8 3.5 - 5.0 g/dL    Globulin 3.8 2.0 - 4.0 g/dL    A-G Ratio 1.0 (L) 1.1 - 2.2     ETHYL ALCOHOL   Result Value Ref Range    ALCOHOL(ETHYL),SERUM 283 (H) <10 mg/dL   URINALYSIS W/ REFLEX CULTURE    Specimen: Urine   Result Value Ref Range    Color Yellow/Straw      Appearance Turbid (A) Clear      Specific gravity 1.010 1.003 - 1.030      pH (UA) 6.0 5.0 - 8.0      Protein Negative Negative mg/dL    Glucose Negative Negative mg/dL    Ketone Negative Negative mg/dL    Bilirubin Negative Negative      Blood Small (A) Negative      Urobilinogen 2.0 (H) 0.1 - 1.0 EU/dL    Nitrites Negative Negative      Leukocyte Esterase Trace (A) Negative      UA:UC IF INDICATED Culture not indicated by UA result Culture not indicated by UA result      WBC 5-10 0 - 4 /hpf    RBC 0-5 0 - 5 /hpf    Epithelial cells Moderate (A) Few /lpf    Bacteria 1+ (A) Negative /hpf    Mucus Trace /lpf   DRUG SCREEN, URINE   Result Value Ref Range    AMPHETAMINES Negative Negative      BARBITURATES Negative Negative      BENZODIAZEPINES Negative Negative      COCAINE Negative Negative      METHADONE Negative Negative      OPIATES Negative Negative      PCP(PHENCYCLIDINE) Negative Negative      THC (TH-CANNABINOL) Negative Negative      Drug screen comment        This test is a screen for drugs of abuse in a medical setting only (i.e., they are unconfirmed results and as such must not be used for non-medical purposes, e.g.,employment testing, legal testing). Due to its inherent nature, false positive (FP) and false negative (FN) results may be obtained. Therefore, if necessary for medical care, recommend confirmation of positive findings by GC/MS. HCG URINE, QL   Result Value Ref Range    HCG urine, QL Negative Negative     ETHYL ALCOHOL   Result Value Ref Range    ALCOHOL(ETHYL),SERUM 195 (H) <10 mg/dL       Immunizations administered during this encounter:   Immunization History   Administered Date(s) Administered    COVID-19, PFIZER PURPLE top, DILUTE for use, (age 15 y+), IM, 30mcg/0.3mL 03/29/2021, 04/29/2021    HPV 07/19/2016    Hep A Vaccine 12/10/2008, 08/31/2009    Influenza Vaccine 12/10/2008, 08/31/2009, 07/04/2013, 10/19/2016    Influenza, FLUARIX, FLULAVAL, FLUZONE (age 10 mo+) AND AFLURIA, (age 1 y+), PF, 0.5mL 10/24/2020    Meningococcal (MCV4O) Vaccine 07/19/2016    Varicella Virus Vaccine 12/10/2008       Screening for Metabolic Disorders for Patients on Antipsychotic Medications  (Data obtained from the EMR)    Estimated Body Mass Index  Estimated body mass index is 23.24 kg/m² as calculated from the following:    Height as of this encounter: 5' 6\" (1.676 m). Weight as of this encounter: 65.3 kg (144 lb).      Vital Signs/Blood Pressure  Visit Vitals  /78   Pulse 94   Temp 98.3 °F (36.8 °C)   Resp 18   Ht 5' 6\" (1.676 m)   Wt 65.3 kg (144 lb)   SpO2 100%   BMI 23.24 kg/m²       Blood Glucose/Hemoglobin A1c  Lab Results   Component Value Date/Time    Glucose 167 (H) 03/06/2023 12:44 AM    Glucose (POC) 82 10/20/2022 03:16 PM       Lab Results   Component Value Date/Time    Hemoglobin A1c 4.8 02/13/2023 05:13 PM        Lipid Panel  Lab Results   Component Value Date/Time    Cholesterol, total 158 05/29/2020 02:41 PM    HDL Cholesterol 71 05/29/2020 02:41 PM    LDL, calculated 59 05/29/2020 02:41 PM    Triglyceride 138 05/29/2020 02:41 PM        Discharge Diagnosis: depression    Discharge Plan: pt will be going to inpatient substance use treatment at [facility]. Discharge Medication List and Instructions:   Current Discharge Medication List          Unresulted Labs (24h ago, onward)      None          To obtain results of studies pending at discharge, please contact [phone]    Follow-up Information       Follow up With Specialties Details Why [facility] of [address] on 3/10/2023 10:30a  to go to inpatient substance use treatment [address] [address]            Advanced Directive:   Does the patient have an appointed surrogate decision maker? No  Does the patient have a Medical Advance Directive? No  Does the patient have a Psychiatric Advance Directive? No  If the patient does not have a surrogate or Medical Advance Directive AND Psychiatric Advance Directive, the patient was offered information on these advance directives Patient will complete at a later time    Patient Instructions: Please continue all medications until otherwise directed by physician. Tobacco Cessation Discharge Plan:   Is the patient a smoker and needs referral for smoking cessation? Not applicable  Patient referred to the following for smoking cessation with an appointment? Not applicable     Patient was offered medication to assist with smoking cessation at discharge? Not applicable  Was education for smoking cessation added to the discharge instructions? Not applicable    Alcohol/Substance Abuse Discharge Plan:   Does the patient have a history of substance/alcohol abuse and requires a referral for treatment? Yes  Patient referred to the following for substance/alcohol abuse treatment with an appointment? Yes  Patient was offered medication to assist with alcohol cessation at discharge?  Not applicable  Was education for substance/alcohol abuse added to discharge instructions?  Yes    Patient discharged to Inpatient facility; discussed with the receiving facility  plan for follow-up care

## 2023-03-10 NOTE — BH NOTES
Pt discharged this dqy . Prior to leaving pt did confirm having all valuables and belongings. Pt received written discharge instructions and all questions answered. Pt left the unit 1125 transported to Rehab for admission (substance abuse).

## 2023-03-10 NOTE — PROGRESS NOTES
Progress Note  Date:3/9/2023       Room:[facility]  Patient Name:Estephanie Powers     YOB: 1995     Age:27 y.o. Subjective    Subjective   Patient anxious about going to inpatient rehab. She is still mostly being to herself. She states she needs to get the help. She denies any suicidal or homicidal feelings. She has expressed feeling tired with Librium. Mental status examination-patient is alert and oriented to name place person implementing she has been engaging in conversation initially more focused on wanting to go to rehab and now she is anxious about going. Still presents with feeling comfortable with her decrease in her. No evidence of any active psychosis noted. Review of Systems  Objective         Vitals Last 24 Hours:  TEMPERATURE:  Temp  Av.4 °F (36.9 °C)  Min: 97.8 °F (36.6 °C)  Max: 99 °F (37.2 °C)  RESPIRATIONS RANGE: Resp  Av  Min: 18  Max: 18  PULSE OXIMETRY RANGE: No data recorded  PULSE RANGE: Pulse  Av.5  Min: 90  Max: 109  BLOOD PRESSURE RANGE: Systolic (08FUB), WFI:197 , Min:118 , KEW:712   ; Diastolic (21STE), ONQ:04, Min:74, Max:75    I/O (24Hr): No intake or output data in the 24 hours ending 23  Objective  Labs/Imaging/Diagnostics    Labs:  CBC:No results for input(s): WBC, RBC, HGB, HCT, MCV, RDW, PLT, HGBEXT, HCTEXT, PLTEXT in the last 72 hours. CHEMISTRIES:No results for input(s): NA, K, CL, CO2, BUN, CA, PHOS, MG in the last 72 hours. No lab exists for component: CREATININE, GLUCOSEPT/INR:No results for input(s): INR, INREXT in the last 72 hours. No lab exists for component: PROTIME  APTT:No results for input(s): APTT in the last 72 hours. LIVER PROFILE:No results for input(s): AST, ALT in the last 72 hours. No lab exists for component: Dara Paul, ALKPHOS  Lab Results   Component Value Date/Time    ALT (SGPT) 30 2023 12:44 AM    AST (SGOT) 38 (H) 2023 12:44 AM    Alk. phosphatase 89 03/06/2023 12:44 AM    Bilirubin, direct 0.23 02/13/2023 05:13 PM    Bilirubin, total 0.8 03/06/2023 12:44 AM       Imaging Last 24 Hours:  No results found. Assessment//Plan   Active Problems:    Depression (3/6/2023)      Assessment & Plan  Decrease Librium to 5 mg p.o. 3 times daily participation in therapy.   Patient provided education for inpatient rehab    Current Facility-Administered Medications:     chlordiazePOXIDE (LIBRIUM) capsule 5 mg, 5 mg, Oral, TID, Miracle Garrido MD, 5 mg at 03/09/23 1540    nicotine (NICODERM CQ) 7 mg/24 hr patch 1 Patch, 1 Patch, TransDERmal, DAILY, Miracle Garrido MD, 1 Patch at 03/09/23 5111    venlafaxine-SR (EFFEXOR-XR) capsule 225 mg, 225 mg, Oral, DAILY WITH Bao Callejas MD, 225 mg at 03/09/23 2382    hydrOXYzine HCL (ATARAX) tablet 50 mg, 50 mg, Oral, TID PRN, Rachelle Rodriguez MD, 50 mg at 03/07/23 0856    traZODone (DESYREL) tablet 50 mg, 50 mg, Oral, QHS PRN, Rachelle Rodriguez MD, 50 mg at 03/08/23 2120    acetaminophen (TYLENOL) tablet 650 mg, 650 mg, Oral, Q4H PRN, Miracle Garrido MD    magnesium hydroxide (MILK OF MAGNESIA) 400 mg/5 mL oral suspension 30 mL, 30 mL, Oral, DAILY PRN, Miracle Garrido MD    thiamine mononitrate (B-1) tablet 100 mg, 100 mg, Oral, DAILY, Miracle Garrido MD, 100 mg at 69/39/69 2281    folic acid (FOLVITE) tablet 1 mg, 1 mg, Oral, DAILY, Rachelle Rodriguez MD, 1 mg at 03/09/23 1351   Electronically signed by Lui Mccord MD on 3/9/2023 at 8:42 PM

## 2023-04-21 ENCOUNTER — DOCUMENTATION ONLY (OUTPATIENT)
Dept: HEMATOLOGY | Age: 28
End: 2023-04-21

## 2023-04-21 ENCOUNTER — VIRTUAL VISIT (OUTPATIENT)
Dept: FAMILY MEDICINE CLINIC | Age: 28
End: 2023-04-21

## 2023-04-21 DIAGNOSIS — Z09 HOSPITAL DISCHARGE FOLLOW-UP: ICD-10-CM

## 2023-04-21 DIAGNOSIS — L70.0 ACNE VULGARIS: Primary | ICD-10-CM

## 2023-04-21 DIAGNOSIS — F10.21 ALCOHOL USE DISORDER, MODERATE, IN EARLY REMISSION (HCC): ICD-10-CM

## 2023-04-21 DIAGNOSIS — R45.851 SUICIDAL IDEATION: ICD-10-CM

## 2023-04-21 RX ORDER — BUSPIRONE HYDROCHLORIDE 7.5 MG/1
7.5 TABLET ORAL 2 TIMES DAILY
COMMUNITY

## 2023-04-21 RX ORDER — PROPRANOLOL HYDROCHLORIDE 10 MG/1
10 TABLET ORAL 2 TIMES DAILY
COMMUNITY

## 2023-04-21 RX ORDER — DOXYCYCLINE 100 MG/1
100 TABLET ORAL DAILY
Qty: 90 TABLET | Refills: 1 | Status: SHIPPED | OUTPATIENT
Start: 2023-04-21 | End: 2023-10-18

## 2023-04-21 NOTE — PROGRESS NOTES
Chief Complaint   Patient presents with    Follow Up Chronic Condition     1. \"Have you been to the ER, urgent care clinic since your last visit? Hospitalized since your last visit? \" Yes [facility] for psych eval    2. \"Have you seen or consulted any other health care providers outside of the [address] [place] [address] since your last visit? \" Yes Provider at rehab      3. For patients aged 39-70: Has the patient had a colonoscopy / FIT/ Cologuard? NA - based on age      If the patient is female:    4. For patients aged 41-77: Has the patient had a mammogram within the past 2 years? NA - based on age or sex      11. For patients aged 21-65: Has the patient had a pap smear?  No    3 most recent PHQ Screens 4/21/2023   Little interest or pleasure in doing things Several days   Feeling down, depressed, irritable, or hopeless Several days   Total Score PHQ 2 2   Trouble falling or staying asleep, or sleeping too much -   Feeling tired or having little energy -   Poor appetite, weight loss, or overeating -   Feeling bad about yourself - or that you are a failure or have let yourself or your family down -   Trouble concentrating on things such as school, work, reading, or watching TV -   Moving or speaking so slowly that other people could have noticed; or the opposite being so fidgety that others notice -   Thoughts of being better off dead, or hurting yourself in some way -   PHQ 9 Score -   How difficult have these problems made it for you to do your work, take care of your home and get along with others -     LINK SENT TO PT.

## 2023-04-21 NOTE — PROGRESS NOTES
Subjective:     Chief Complaint   Patient presents with    Follow Up Chronic Condition     HPI:  Neil Ernst is a 24 y.o. female. Patient is here for hospital discharge , and discharged from rehab. She is in the hospital for 7 days, and was discharged to recovery symptoms of [place] for 30 days. She has a long history of alcohol abuse, and liver disease. She has not any more alcohol since leaving rehab. She had suicidal ideation prior to going to the hospital.  She was continued on Effexor while at rehab was started on buspirone which helped her anxiety significantly. She has history of acne and would like to try doxycycline. She is on spironolactone. Patient Active Problem List    Diagnosis    Depression    Sinus tachycardia     Patient reports negative cardiology work-up. Likely associated to anxiety. Alcohol withdrawal (HCC)    Calculus of gallbladder without cholecystitis     Seen on CT. Splenomegaly    Alcoholic cirrhosis of liver with ascites [facility])     Following at [address] with Monique Perez. Neuropathy    Thrombocytopenia (HCC)     Secondary to liver disease      Polycystic ovarian syndrome    Recurrent major depressive disorder, in partial remission [facility])     Per records from [place] 10/03/2018: patient had been on Venlafaxine  for depression.         Past Medical History:   Diagnosis Date    Anemia     Chronic kidney disease     Chronic pain     Contact dermatitis and eczema due to cause     Depression     Liver disease     Polycystic ovarian syndrome      Family History   Problem Relation Age of Onset    Alcohol abuse Father     Diabetes Maternal Grandfather     Other Maternal Grandmother         macular degeneration    Cancer Maternal Grandmother         skin    Depression Mother     Atopy Sister     No Known Problems Brother     No Known Problems Paternal Grandmother     No Known Problems Paternal Grandfather Heart Disease Neg Hx     Hypertension Neg Hx       reports that she quit smoking about 16 months ago. Her smoking use included cigarettes. She has a 4.00 pack-year smoking history. She uses smokeless tobacco. She reports current alcohol use. She reports that she does not currently use drugs after having used the following drugs: Marijuana. Current Outpatient Medications on File Prior to Visit   Medication Sig Dispense Refill    propranoloL (INDERAL) 10 mg tablet Take 1 Tablet by mouth two (2) times a day. busPIRone (BUSPAR) 7.5 mg tablet Take 1 Tablet by mouth two (2) times a day. venlafaxine-SR (EFFEXOR-XR) 150 mg capsule Take 1 Capsule by mouth daily. 30 Capsule 1    venlafaxine-SR (EFFEXOR-XR) 75 mg capsule Take 1 Capsule by mouth daily. Total of 225 mg daily in combination with 15o capsule. 30 Capsule 1    traZODone (DESYREL) 50 mg tablet Take 1 Tablet by mouth nightly as needed for Sleep (For insomnia). (Patient taking differently: Take 4 Tablets by mouth nightly as needed for Sleep (For insomnia). ) 30 Tablet 1     No current facility-administered medications on file prior to visit. Allergies   Allergen Reactions    Sulfa (Sulfonamide Antibiotics) Anaphylaxis, Hives and Shortness of Breath     ROS    Objective: There were no vitals filed for this visit. Physical Exam       Assessment/Plan:       ICD-10-CM ICD-9-CM    1. Acne vulgaris  L70.0 706.1 doxycycline (ADOXA) 100 mg tablet        Diagnoses and all orders for this visit:    1. Acne vulgaris  -     doxycycline (ADOXA) 100 mg tablet; Take 1 Tablet by mouth daily for 180 days.       Follow-Up:       Linus Whalen MD

## 2023-04-22 DIAGNOSIS — K70.30 ALCOHOLIC CIRRHOSIS OF LIVER WITHOUT ASCITES (HCC): Primary | ICD-10-CM

## 2023-04-22 RX ORDER — SPIRONOLACTONE 50 MG/1
50 TABLET, FILM COATED ORAL DAILY
COMMUNITY

## 2023-04-22 RX ORDER — GABAPENTIN 100 MG/1
CAPSULE ORAL 2 TIMES DAILY
COMMUNITY

## 2023-04-22 RX ORDER — NALTREXONE HYDROCHLORIDE 50 MG/1
50 TABLET, FILM COATED ORAL DAILY
COMMUNITY

## 2023-04-24 ENCOUNTER — HOSPITAL ENCOUNTER (OUTPATIENT)
Dept: ULTRASOUND IMAGING | Age: 28
Discharge: HOME | End: 2023-04-24
Attending: PHYSICIAN ASSISTANT
Payer: OTHER GOVERNMENT

## 2023-04-24 ENCOUNTER — OFFICE VISIT (OUTPATIENT)
Dept: HEMATOLOGY | Age: 28
End: 2023-04-24
Payer: OTHER GOVERNMENT

## 2023-04-24 VITALS
OXYGEN SATURATION: 100 % | TEMPERATURE: 97.5 F | DIASTOLIC BLOOD PRESSURE: 72 MMHG | WEIGHT: 156.6 LBS | SYSTOLIC BLOOD PRESSURE: 108 MMHG | HEART RATE: 112 BPM | BODY MASS INDEX: 25.17 KG/M2 | HEIGHT: 66 IN

## 2023-04-24 DIAGNOSIS — K70.30 ALCOHOLIC CIRRHOSIS OF LIVER WITHOUT ASCITES (HCC): ICD-10-CM

## 2023-04-24 DIAGNOSIS — K70.30 ALCOHOLIC CIRRHOSIS OF LIVER WITHOUT ASCITES (HCC): Primary | ICD-10-CM

## 2023-04-24 PROCEDURE — 99214 OFFICE O/P EST MOD 30 MIN: CPT | Performed by: PHYSICIAN ASSISTANT

## 2023-04-24 PROCEDURE — 76700 US EXAM ABDOM COMPLETE: CPT

## 2023-04-24 NOTE — PROGRESS NOTES
Formerly [facility], MD, FACP, [place], [place]      Mulu Ordaz PA-C    April S Ninoska, [facility]   Donya Blanco, [facility]   Dedrick Laurent, FNP-ORLIN Madden, FNP-C   Laurie Orourke, [facility]      [address]   at [facility]   [address], [address]   [phone]   FAX: [phone]  [facility] [place] [facility]   at [address], [address] [place] [address], [address]   [phone]   FAX: [phone]       Patient Care Team:  Toña Gill MD as PCP - General (Family Medicine)  Toña Gill MD as PCP - [facility]  Dinora Chiang MD (Urology)      Problem List  Date Reviewed: 4/21/2023            Codes Class Noted    Depression ICD-10-CM: D50. A  ICD-9-CM: 116  3/6/2023        Sinus tachycardia ICD-10-CM: R00.0  ICD-9-CM: 427.89  1/9/2023    Overview Addendum 1/16/2023  2:31 PM by Toña Gill MD     Patient reports negative cardiology work-up. Likely associated to anxiety. Alcohol withdrawal ([facility] ICD-10-CM: W33.504  ICD-9-CM: 291.81  12/14/2022        Calculus of gallbladder without cholecystitis ICD-10-CM: K80.20  ICD-9-CM: 574.20  10/19/2022    Overview Signed 10/19/2022 12:37 PM by Toña Gill MD     Seen on CT. Splenomegaly ICD-10-CM: R16.1  ICD-9-CM: 789.2  81/60/9557        Alcoholic cirrhosis of liver with ascites [facility]) ICD-10-CM: K70.31  ICD-9-CM: 571.2  5/13/2022    Overview Signed 2/1/2023 11:17 AM by Jourdan Harrsi MD     [facility] Monique Nunez.              Neuropathy ICD-10-CM: G62.9  ICD-9-CM: 355.9  12/11/2021        Thrombocytopenia ([facility] ICD-10-CM: D69.6  ICD-9-CM: 287.5  12/11/2021    Overview Signed 12/11/2021  9:01 PM by Toña Gill MD     Secondary to liver disease Polycystic ovarian syndrome ICD-10-CM: E28.2  ICD-9-CM: 256.4  Unknown        Recurrent major depressive disorder, in partial remission [facility]) ICD-10-CM: F33.41  ICD-9-CM: 296.35  Unknown    Overview Signed 5/29/2019  7:02 PM by Julia Olivares     Per records from [facility] 10/03/2018: patient had been on Venlafaxine  for depression. Herlinda Starks is being seen at [place] [id number] for management of cirrhosis secondary to alcoholic liver disease. The active problem list, all pertinent past medical history, medications, radiologic findings and laboratory findings related to the liver disorder were reviewed and discussed with the patient. The patient is a 24 y.o.  female who has consumed alcohol in excess over several years. She was hospitalized with alcoholic hepatitis and ascites in 1/2022. She had been abstinent from alcohol late 6/2022 through late 10/2022 and reports that she had multiple relapses through early 3/2023. She was seen in the [facility] at that time for depression and suicide attempt on 3/4/23 with pills and alcohol. She was stopped by her boyfriend and brought to the hospital, admitted for 4 days. She was then transitioned to 30 day inpatient program and is now living in a sober house and continuing with counseling and AA. She reports that she is feeling well at this time and has no new specific physical complaints. She has remained on naltrexone. The patient was found to have cirrhosis in 1/2022 when she developed ascites and was noted to have thrombocytopenia. An assessment of liver fibrosis with Fibroscan was performed in 7/2022 and showed EkPa was 44.8. Suggested fibrosis level is F4. CAP score is 138. Serologic evaluation for markers of chronic liver disease was negative for HCV, HBV, inherited and autoimmune disease. The patient has developed the following complications of cirrhosis: ascites and LE edema. ? New onset HE. She has had less brain fog symptoms lately, but reports that she did not have access to lactulose while inpatient. She has not resumed this medication as of yet and continues to have BM every other day at this point. The patient has mild limitations in functional activities which can be attributed to the liver disease. 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 She had severe RUQ pain and had lap CCY done 10/2022. This has not really helped her abdominal pain symptoms much. She has continued to have largely LUQ pain, thought to be related to splenomegaly. Edema is only an issue at the end of a long day. She is on spironolactone for management of acne. .     She has remained on gabapentin for neuropathy in feet since Spring 2022. This has stabilized. She is sleeping better now that she is home. Trazodone helps. ASSESSMENT AND PLAN:  Cirrhosis  The diagnosis of cirrhosis is based upon laboratory studies, complications of cirrhosis. Fibroscan shows a score of 45. 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 The CTP is 6. Child class A. The MELD score is 10 (9/2022). This will be rechecked today. I have reviewed her past labs in detail and have shown her that with time, she has had improvement in overall liver synthetic function. She had relapse to alcohol Nov through 3/2023 and we will reassess values now. She has completed screening examinations for complications of cirrhosis. EGD in 10/2022 showed no varices and mild portal hypertensive gastropathy. Last imaging was CT scan at the time of 10/2022 EMD presentation without liver mass/lesion. She has had repeat with US this morning, 4/2023, this is pending at the time of office visit and I will follow-up with her on this when available to me.      Alcohol liver disease  The diagnosis is based upon a history of consuming significant alcohol on a daily basis for many years, imaging, pattern of AST>ALT, serology that is negative for other causes of chronic liver disease. The histologic severity has not been defined, clinically/radiographically cirrhotic. Liver enzymes are normal.  ALP is normal.  Liver function is normal.  Total bilirubin is elevated. The platelet count is depressed. These will be repeated today. Discussed the need to remain abstinent from alcohol. She is presently committed to her recovery and is continuing with intensive outpatient work as well as the conditions of her sober-living environment. She understands that she has little functional margin and the absolute necessity of maintaining sobriety. Ascites   Ascites developed for the first time in 1/2022. Ascites has resolved with diuretics, she is only on 50 mg spironolactone, taken mostly for acne at this point. The patient was counseled regarding the need to maintain sodium restriction and the types of foods containing high amounts of sodium to be avoided. She has had a hard time controlling intake as she is inpatient recovery and eating prepared meals. Screening for Esophageal varices   The patient has had an EGD to screen for varices, 10/2022. Plan repeat 2025. She denies evidence of GI blood losses. Hepatic encephalopathy   Overt HE has not developed to date. She has had some mild memory and recall issues and has been seen to have elevation in ammonia on recent labs. I have given her a new prescription for lactulose and instructions on how to dose this medication appropriately to induce 1-2 bowel movements daily. She had taken this for a short time earlier this year and then had her hospitalization. I have asked her to resume. She will inform me if this is not helpful for her and if a trial of Xifaxan is indicated. Thrombocytopenia   This is secondary to cirrhosis. There is no evidence of overt bleeding. No treatment is required.   The platelet count is adequate for the patient to undergo procedures without the need for platelet transfusion or platelet growth factors. Anemia   This is due to multifactorial causes including portal hypertension with chronic GI blood loss, bone marrow suppression secondary to malnutrition and alcohol. This has been shown to be resolved on past labs with prolonged sobriety. We will continue to monitor and consider if iron support is needed. Screening for Hepatocellular Carcinoma  [facility] screening was performed in 10/2022 and does not suggest [facility] Will repeat the imaging study every 6 months, will follow-up on today's report when available. Treatment of other medical problems in patients with chronic liver disease  There are no contraindications for the patient to take most medications that are necessary for treatment of other medical issues. The patient has cirrhosis and should avoid taking NSAIDs which are associated with a higher rate of developing JAZZ. The patient consumes alcohol on a daily basis or has recently stopped consuming alcohol. Regular alcohol use increases the risk of toxicity from acetaminophen. This analgesic should be avoided until the patient has been abstinent from alcohol for 6 months. Osteoporosis  The risk of osteoporosis is increased in patients with cirrhosis. DEXA bone density to assess for osteoporosis has not been performed. This should be ordered by the patients primary care physician. Vaccinations   Vaccination for viral hepatitis B is recommended since the patient has no serologic evidence of previous exposure or vaccination with immunity. Vaccination for viral hepatitis A is not needed. The patient has serologic evidence of prior exposure or vaccination with immunity. The patient has received 2 doses of COVID-19 vaccine. Routine vaccinations against other bacterial and viral agents can be performed as indicated.   Annual flu vaccination should be administered if indicated. ALLERGIES  Allergies   Allergen Reactions    Sulfa (Sulfonamide Antibiotics) Anaphylaxis, Hives and Shortness of Breath       MEDICATIONS  Current Outpatient Medications   Medication Sig    gabapentin (NEURONTIN) 100 mg capsule Take  by mouth two (2) times a day. spironolactone (ALDACTONE) 50 mg tablet Take 1 Tablet by mouth daily. naltrexone (DEPADE) 50 mg tablet Take 1 Tablet by mouth daily. propranoloL (INDERAL) 10 mg tablet Take 1 Tablet by mouth two (2) times a day. busPIRone (BUSPAR) 7.5 mg tablet Take 1 Tablet by mouth two (2) times a day. doxycycline (ADOXA) 100 mg tablet Take 1 Tablet by mouth daily for 180 days. venlafaxine-SR (EFFEXOR-XR) 150 mg capsule Take 1 Capsule by mouth daily. venlafaxine-SR (EFFEXOR-XR) 75 mg capsule Take 1 Capsule by mouth daily. Total of 225 mg daily in combination with 15o capsule. traZODone (DESYREL) 50 mg tablet Take 1 Tablet by mouth nightly as needed for Sleep (For insomnia). (Patient taking differently: Take 4 Tablets by mouth nightly as needed for Sleep (For insomnia). )     No current facility-administered medications for this visit. SYSTEM REVIEW NOT RELATED TO LIVER DISEASE OR REVIEWED ABOVE:  Constitution systems: Negative for fever, chills, weight gain, weight loss. Eyes: Negative for visual changes. ENT: Negative for sore throat, painful swallowing. Respiratory: Negative for cough, hemoptysis, SOB. Cardiology: Negative for chest pain, palpitations. GI:  Negative for constipation or diarrhea. Positive for LUQ pain. : Negative for urinary frequency, dysuria, hematuria, nocturia. Skin: Negative for rash. Hematology: Negative for easy bruising, blood clots. Musculo-skeletal: Negative for back pain, muscle pain, weakness. Neurologic: Negative for headaches, dizziness, vertigo, memory problems not related to HE. Psychology: Negative for anxiety, depression.      FAMILY HISTORY:  The father Has/had the following following chronic disease(s): alcohol abuse. The mother Has/had the following chronic disease(s): None. SOCIAL HISTORY:  The patient is . The patient has no children. The patient has never used tobacco products. The patient uses vapes. The patient has previously consumed alcohol in excess. The patient has been abstinent from alcohol since 3/2023. The patient currently works full time in social work capacity. PHYSICAL EXAMINATION:  Visit Vitals  /72 (BP 1 Location: Right upper arm, BP Patient Position: Sitting, BP Cuff Size: Adult long)   Pulse (!) 112   Temp 97.5 °F (36.4 °C) (Temporal)   Ht 5' 6\" (1.676 m)   Wt 156 lb 9.6 oz (71 kg)   SpO2 100%   BMI 25.28 kg/m²     General: No acute distress. Eyes: Sclera anicteric. ENT: No oral lesions. Thyroid normal.  Nodes: No adenopathy. Skin: No spider angiomata. No jaundice. No palmar erythema. Respiratory: Lungs clear to auscultation. Cardiovascular: Regular heart rate. No murmurs. No JVD. Abdomen: Soft non-tender. Liver size normal to percussion/palpation. Spleen not palpable. No obvious ascites. Extremities: No edema. No muscle wasting. No gross arthritic changes. Neurologic: Alert and oriented. Cranial nerves grossly intact. No asterixis.     LABORATORY STUDIES:  [place] [address] Units 3/6/2023 2/13/2023 1/28/2023   WBC 3.6 - 11.0 K/uL 10.1 4.5 3.9   ANC 1.8 - 8.0 K/UL 6.1  2.7   HGB 11.5 - 16.0 g/dL 13.1 11.7 10.9 (L)    - 400 K/uL 120 (L) 90 (LL) 81 (L)   INR 0.9 - 1.2  1.2    AST 15 - 37 U/L 38 (H) 35 34   ALT 12 - 78 U/L 30 19 28   Alk Phos 45 - 117 U/L 89 69 58   Bili, Total 0.2 - 1.0 mg/dL 0.8 0.6 0.8   Bili, Direct 0.00 - 0.40 mg/dL  0.23 0.2   Albumin 3.5 - 5.0 g/dL 3.8 4.3 3.3 (L)   BUN 6 - 20 mg/dL 6 9 7   Creat 0.55 - 1.02 mg/dL 0.69 0.64 0.74   Na 136 - 145 mmol/L 140 138 139   K 3.5 - 5.1 mmol/L 3.4 (L) 3.6 3.8   Cl 97 - 108 mmol/L 109 (H) 102 108   CO2 21 - 32 mmol/L 26 24 27   Glucose 65 - 100 mg/dL 167 (H) 71 156 (H)   Magnesium 1.6 - 2.4 mg/dL      Ammonia 29 - 112 ug/dL  110 70 (H)     Cancer Screening Latest Ref Rng & Units 2/13/2023 7/28/2022   AFP, Serum 0.0 - 4.7 ng/mL 2.5 2.9   AFP-L3% 0.0 - 9.9 % Comment Comment   Additional lab values drawn at today's office visit are pending at the time of documentation. SEROLOGIES:  Serologies Latest Ref Rng & Units 4/21/2022 1/23/2022   Hep A Ab, Total Negative Positive (A)    Hep B Surface Ag Index  <0.10   Hep B Surface Ag Interp Negative  Negative   Hep B Core Ab, Total Negative Negative    Hep B Surface AB QL  Non Reactive    Hep C Ab NONREACTIVE  NONREACTIVE   Ferritin 15 - 150 ng/mL 77    Iron % Saturation 15 - 55 % 54    MANI, IFA  Negative    ASMCA 0 - 19 Units 12    M2 Ab 0.0 - 20.0 Units <20.0    Ceruloplasmin 19.0 - 39.0 mg/dL 22.8    Alpha-1 antitrypsin level 100 - 188 mg/dL 217 (H)      LIVER HISTOLOGY:  7/2022. [facility]. EkPa was 44.8. Suggested fibrosis level is F4. CAP score is 138. ENDOSCOPIC PROCEDURES:  10/2022. EGD performed by Dr Chasidy Chino. No esophageal varices. No gastric varices. Mild portal gastropathy. RADIOLOGY:  1/2022. Ultrasound of liver. Echogenic consistent with fatty liver. No liver mass lesions. No dilated bile ducts. No ascites. 10/2022. CT abdomen with IV contrast.  No liver mass/lesion, fatty infiltration. Splenomegaly identified. 4/2023. Ultrasound of abdomen. Pending at the time of office visit. OTHER TESTING:  Not available or performed    FOLLOW-UP:  All of the issues listed above in the Assessment and Plan were discussed with the patient. All questions were answered. The patient expressed a clear understanding of the above. [address] [facility] in 3 months for office visit to monitor response to lactulose and her ongoing sobriety.         Yaakov Weston PA-C  L[facility] [place] [facility] [address], [address] [facility] [place], [place]  [address].  [phone]  [address] [facility]

## 2023-04-24 NOTE — PROGRESS NOTES
Identified pt with two pt identifiers(name and ). Reviewed record in preparation for visit and have obtained necessary documentation. Chief Complaint   Patient presents with    Cirrhosis Of Liver     2month follow up      Vitals:    23 1119   BP: 108/72   Pulse: (!) 112   Temp: 97.5 °F (36.4 °C)   TempSrc: Temporal   SpO2: 100%   Weight: 156 lb 9.6 oz (71 kg)   Height: 5' 6\" (1.676 m)   PainSc:   0 - No pain       Health Maintenance Review: Patient reminded of \"due or due soon\" health maintenance. I have asked the patient to contact his/her primary care provider (PCP) for follow-up on his/her health maintenance. Coordination of Care Questionnaire:  :   1) Have you been to an emergency room, urgent care, or hospitalized since your last visit? If yes, where when, and reason for visit? Yes. Admission to [facility] for detox. 2. Have seen or consulted any other health care provider since your last visit? If yes, where when, and reason for visit? YES. PCP      Patient is accompanied by self I have received verbal consent from Debi Serra to discuss any/all medical information while they are present in the room.

## 2023-04-25 ENCOUNTER — TELEPHONE (OUTPATIENT)
Dept: FAMILY MEDICINE CLINIC | Age: 28
End: 2023-04-25

## 2023-04-25 LAB
ALBUMIN SERPL-MCNC: 4.3 G/DL (ref 3.9–5)
ALP SERPL-CCNC: 71 IU/L (ref 44–121)
ALT SERPL-CCNC: 21 IU/L (ref 0–32)
AST SERPL-CCNC: 34 IU/L (ref 0–40)
BILIRUB DIRECT SERPL-MCNC: 0.17 MG/DL (ref 0–0.4)
BILIRUB SERPL-MCNC: 0.5 MG/DL (ref 0–1.2)
BUN SERPL-MCNC: 9 MG/DL (ref 6–20)
BUN/CREAT SERPL: 12 (ref 9–23)
CALCIUM SERPL-MCNC: 9.1 MG/DL (ref 8.7–10.2)
CHLORIDE SERPL-SCNC: 107 MMOL/L (ref 96–106)
CO2 SERPL-SCNC: 25 MMOL/L (ref 20–29)
CREAT SERPL-MCNC: 0.77 MG/DL (ref 0.57–1)
EGFRCR SERPLBLD CKD-EPI 2021: 110 ML/MIN/1.73
ERYTHROCYTE [DISTWIDTH] IN BLOOD BY AUTOMATED COUNT: 12.8 % (ref 11.7–15.4)
GLUCOSE SERPL-MCNC: 84 MG/DL (ref 70–99)
HCT VFR BLD AUTO: 35.3 % (ref 34–46.6)
HGB BLD-MCNC: 12.2 G/DL (ref 11.1–15.9)
INR PPP: 1.2 (ref 0.9–1.2)
MCH RBC QN AUTO: 28.6 PG (ref 26.6–33)
MCHC RBC AUTO-ENTMCNC: 34.6 G/DL (ref 31.5–35.7)
MCV RBC AUTO: 83 FL (ref 79–97)
PLATELET # BLD AUTO: 102 X10E3/UL (ref 150–450)
POTASSIUM SERPL-SCNC: 4.2 MMOL/L (ref 3.5–5.2)
PROT SERPL-MCNC: 7 G/DL (ref 6–8.5)
PROTHROMBIN TIME: 12.2 SEC (ref 9.1–12)
RBC # BLD AUTO: 4.26 X10E6/UL (ref 3.77–5.28)
SODIUM SERPL-SCNC: 143 MMOL/L (ref 134–144)
WBC # BLD AUTO: 4 X10E3/UL (ref 3.4–10.8)

## 2023-04-25 RX ORDER — BUSPIRONE HYDROCHLORIDE 7.5 MG/1
7.5 TABLET ORAL 2 TIMES DAILY
Qty: 180 TABLET | Refills: 1 | Status: SHIPPED | OUTPATIENT
Start: 2023-04-25

## 2023-04-25 RX ORDER — PROPRANOLOL HYDROCHLORIDE 10 MG/1
10 TABLET ORAL 2 TIMES DAILY
Qty: 180 TABLET | Refills: 1 | Status: SHIPPED | OUTPATIENT
Start: 2023-04-25

## 2023-04-25 RX ORDER — NALTREXONE HYDROCHLORIDE 50 MG/1
50 TABLET, FILM COATED ORAL DAILY
Qty: 90 TABLET | Refills: 1 | Status: SHIPPED | OUTPATIENT
Start: 2023-04-25

## 2023-04-25 NOTE — PROGRESS NOTES
Pt notified via [facility] letter of findings of stable function/enzymes, will follow-up on AFP as available. US report also reviewed and is without concern for mass/ascites. Follow-up as discussed in 3 months.

## 2023-04-26 LAB
AFP L3 MFR SERPL: NORMAL % (ref 0–9.9)
AFP SERPL-MCNC: 2.1 NG/ML (ref 0–4.7)

## 2023-04-27 RX ORDER — NALTREXONE HYDROCHLORIDE 50 MG/1
50 TABLET, FILM COATED ORAL DAILY
COMMUNITY

## 2023-04-27 RX ORDER — SPIRONOLACTONE 50 MG/1
50 TABLET, FILM COATED ORAL DAILY
COMMUNITY
End: 2023-05-24

## 2023-04-27 RX ORDER — VENLAFAXINE HYDROCHLORIDE 150 MG/1
150 CAPSULE, EXTENDED RELEASE ORAL DAILY
COMMUNITY
Start: 2023-03-10

## 2023-04-27 RX ORDER — DOXYCYCLINE 100 MG/1
100 TABLET ORAL DAILY
Qty: 30 TABLET | Refills: 5 | Status: ON HOLD | COMMUNITY
Start: 2023-04-21 | End: 2023-05-25 | Stop reason: HOSPADM

## 2023-04-27 RX ORDER — BUSPIRONE HYDROCHLORIDE 7.5 MG/1
7.5 TABLET ORAL 2 TIMES DAILY
COMMUNITY

## 2023-04-27 RX ORDER — VENLAFAXINE HYDROCHLORIDE 75 MG/1
75 CAPSULE, EXTENDED RELEASE ORAL DAILY
COMMUNITY
Start: 2023-03-10 | End: 2023-06-09 | Stop reason: SDUPTHER

## 2023-04-27 RX ORDER — PROPRANOLOL HYDROCHLORIDE 10 MG/1
10 TABLET ORAL 2 TIMES DAILY
COMMUNITY
End: 2023-06-09 | Stop reason: SDUPTHER

## 2023-04-27 RX ORDER — GABAPENTIN 100 MG/1
100 CAPSULE ORAL 2 TIMES DAILY
COMMUNITY
End: 2023-06-09 | Stop reason: SDUPTHER

## 2023-04-27 RX ORDER — TRAZODONE HYDROCHLORIDE 100 MG/1
200 TABLET ORAL NIGHTLY
COMMUNITY
Start: 2023-03-10

## 2023-04-29 DIAGNOSIS — F41.9 ANXIETY: ICD-10-CM

## 2023-04-29 RX ORDER — CLONIDINE HYDROCHLORIDE 0.1 MG/1
TABLET ORAL
Qty: 180 TABLET | Refills: 1 | Status: SHIPPED | OUTPATIENT
Start: 2023-04-29

## 2023-05-20 ENCOUNTER — HOSPITAL ENCOUNTER (EMERGENCY)
Facility: HOSPITAL | Age: 28
Discharge: HOME | End: 2023-05-20
Attending: EMERGENCY MEDICINE
Payer: OTHER GOVERNMENT

## 2023-05-20 VITALS
TEMPERATURE: 98.7 F | HEIGHT: 66 IN | BODY MASS INDEX: 25.71 KG/M2 | WEIGHT: 160 LBS | DIASTOLIC BLOOD PRESSURE: 83 MMHG | HEART RATE: 88 BPM | OXYGEN SATURATION: 99 % | SYSTOLIC BLOOD PRESSURE: 115 MMHG | RESPIRATION RATE: 18 BRPM

## 2023-05-20 DIAGNOSIS — F10.930 ALCOHOL WITHDRAWAL SYNDROME WITHOUT COMPLICATION (HCC): ICD-10-CM

## 2023-05-20 DIAGNOSIS — F10.10 ALCOHOL ABUSE: Primary | ICD-10-CM

## 2023-05-20 LAB
ALBUMIN SERPL-MCNC: 3.9 G/DL (ref 3.5–5)
ALBUMIN/GLOB SERPL: 1 (ref 1.1–2.2)
ALP SERPL-CCNC: 76 U/L (ref 45–117)
ALT SERPL-CCNC: 34 U/L (ref 12–78)
AMPHET UR QL SCN: NEGATIVE
ANION GAP SERPL CALC-SCNC: 4 MMOL/L (ref 5–15)
APPEARANCE UR: CLEAR
AST SERPL W P-5'-P-CCNC: 39 U/L (ref 15–37)
BACTERIA URNS QL MICRO: NEGATIVE /HPF
BARBITURATES UR QL SCN: NEGATIVE
BASOPHILS # BLD: 0 K/UL (ref 0–0.1)
BASOPHILS NFR BLD: 1 % (ref 0–1)
BENZODIAZ UR QL: POSITIVE
BILIRUB SERPL-MCNC: 0.7 MG/DL (ref 0.2–1)
BILIRUB UR QL: NEGATIVE
BUN SERPL-MCNC: 11 MG/DL (ref 6–20)
BUN/CREAT SERPL: 16 (ref 12–20)
CA-I BLD-MCNC: 8.8 MG/DL (ref 8.5–10.1)
CANNABINOIDS UR QL SCN: NEGATIVE
CHLORIDE SERPL-SCNC: 106 MMOL/L (ref 97–108)
CO2 SERPL-SCNC: 30 MMOL/L (ref 21–32)
COCAINE UR QL SCN: NEGATIVE
COLOR UR: ABNORMAL
CREAT SERPL-MCNC: 0.7 MG/DL (ref 0.55–1.02)
DIFFERENTIAL METHOD BLD: ABNORMAL
EOSINOPHIL # BLD: 0.1 K/UL (ref 0–0.4)
EOSINOPHIL NFR BLD: 1 % (ref 0–7)
ERYTHROCYTE [DISTWIDTH] IN BLOOD BY AUTOMATED COUNT: 12.7 % (ref 11.5–14.5)
ETHANOL SERPL-MCNC: 153 MG/DL (ref 0–0.08)
GLOBULIN SER CALC-MCNC: 4 G/DL (ref 2–4)
GLUCOSE SERPL-MCNC: 96 MG/DL (ref 65–100)
GLUCOSE UR STRIP.AUTO-MCNC: NEGATIVE MG/DL
HCG SERPL QL: NEGATIVE
HCT VFR BLD AUTO: 39.6 % (ref 35–47)
HGB BLD-MCNC: 12.5 G/DL (ref 11.5–16)
HGB UR QL STRIP: NEGATIVE
IMM GRANULOCYTES # BLD AUTO: 0 K/UL (ref 0–0.04)
IMM GRANULOCYTES NFR BLD AUTO: 0 % (ref 0–0.5)
KETONES UR QL STRIP.AUTO: NEGATIVE MG/DL
LEUKOCYTE ESTERASE UR QL STRIP.AUTO: NEGATIVE
LIPASE SERPL-CCNC: 151 U/L (ref 73–393)
LYMPHOCYTES # BLD: 2 K/UL (ref 0.8–3.5)
LYMPHOCYTES NFR BLD: 24 % (ref 12–49)
Lab: ABNORMAL
MCH RBC QN AUTO: 27.3 PG (ref 26–34)
MCHC RBC AUTO-ENTMCNC: 31.6 G/DL (ref 30–36.5)
MCV RBC AUTO: 86.5 FL (ref 80–99)
METHADONE UR QL: NEGATIVE
MONOCYTES # BLD: 0.8 K/UL (ref 0–1)
MONOCYTES NFR BLD: 9 % (ref 5–13)
MUCOUS THREADS URNS QL MICRO: ABNORMAL /LPF
NEUTS SEG # BLD: 5.3 K/UL (ref 1.8–8)
NEUTS SEG NFR BLD: 65 % (ref 32–75)
NITRITE UR QL STRIP.AUTO: NEGATIVE
NRBC # BLD: 0 K/UL (ref 0–0.01)
NRBC BLD-RTO: 0 PER 100 WBC
OPIATES UR QL: NEGATIVE
PCP UR QL: NEGATIVE
PH UR STRIP: 7 (ref 5–8)
PLATELET # BLD AUTO: 122 K/UL (ref 150–400)
PMV BLD AUTO: 10.4 FL (ref 8.9–12.9)
POTASSIUM SERPL-SCNC: 4 MMOL/L (ref 3.5–5.1)
PROT SERPL-MCNC: 7.9 G/DL (ref 6.4–8.2)
PROT UR STRIP-MCNC: NEGATIVE MG/DL
RBC # BLD AUTO: 4.58 M/UL (ref 3.8–5.2)
RBC #/AREA URNS HPF: ABNORMAL /HPF (ref 0–5)
SODIUM SERPL-SCNC: 140 MMOL/L (ref 136–145)
SP GR UR REFRACTOMETRY: 1.01 (ref 1–1.03)
UROBILINOGEN UR QL STRIP.AUTO: 2 EU/DL (ref 0.1–1)
WBC # BLD AUTO: 8.2 K/UL (ref 3.6–11)
WBC URNS QL MICRO: ABNORMAL /HPF (ref 0–4)

## 2023-05-20 PROCEDURE — 96361 HYDRATE IV INFUSION ADD-ON: CPT

## 2023-05-20 PROCEDURE — 83690 ASSAY OF LIPASE: CPT

## 2023-05-20 PROCEDURE — 6370000000 HC RX 637 (ALT 250 FOR IP): Performed by: EMERGENCY MEDICINE

## 2023-05-20 PROCEDURE — 2580000003 HC RX 258: Performed by: EMERGENCY MEDICINE

## 2023-05-20 PROCEDURE — 36415 COLL VENOUS BLD VENIPUNCTURE: CPT

## 2023-05-20 PROCEDURE — 81001 URINALYSIS AUTO W/SCOPE: CPT

## 2023-05-20 PROCEDURE — 80053 COMPREHEN METABOLIC PANEL: CPT

## 2023-05-20 PROCEDURE — 84703 CHORIONIC GONADOTROPIN ASSAY: CPT

## 2023-05-20 PROCEDURE — 82077 ASSAY SPEC XCP UR&BREATH IA: CPT

## 2023-05-20 PROCEDURE — 80307 DRUG TEST PRSMV CHEM ANLYZR: CPT

## 2023-05-20 PROCEDURE — 85025 COMPLETE CBC W/AUTO DIFF WBC: CPT

## 2023-05-20 PROCEDURE — 6360000002 HC RX W HCPCS: Performed by: EMERGENCY MEDICINE

## 2023-05-20 PROCEDURE — 96374 THER/PROPH/DIAG INJ IV PUSH: CPT

## 2023-05-20 PROCEDURE — 99284 EMERGENCY DEPT VISIT MOD MDM: CPT

## 2023-05-20 RX ORDER — FOLIC ACID 1 MG/1
1 TABLET ORAL ONCE
Status: COMPLETED | OUTPATIENT
Start: 2023-05-20 | End: 2023-05-20

## 2023-05-20 RX ORDER — GAUZE BANDAGE 2" X 2"
100 BANDAGE TOPICAL ONCE
Status: COMPLETED | OUTPATIENT
Start: 2023-05-20 | End: 2023-05-20

## 2023-05-20 RX ORDER — CHLORDIAZEPOXIDE HYDROCHLORIDE 10 MG/1
10 CAPSULE, GELATIN COATED ORAL 3 TIMES DAILY PRN
Qty: 8 CAPSULE | Refills: 0 | Status: SHIPPED | OUTPATIENT
Start: 2023-05-20 | End: 2023-05-23

## 2023-05-20 RX ORDER — CHLORDIAZEPOXIDE HYDROCHLORIDE 10 MG/1
10 CAPSULE, GELATIN COATED ORAL 3 TIMES DAILY PRN
Qty: 8 CAPSULE | Refills: 0 | Status: SHIPPED | OUTPATIENT
Start: 2023-05-20 | End: 2023-05-20 | Stop reason: SDUPTHER

## 2023-05-20 RX ORDER — MULTIVITAMIN WITH IRON
1 TABLET ORAL ONCE
Status: COMPLETED | OUTPATIENT
Start: 2023-05-20 | End: 2023-05-20

## 2023-05-20 RX ORDER — ONDANSETRON 2 MG/ML
4 INJECTION INTRAMUSCULAR; INTRAVENOUS ONCE
Status: COMPLETED | OUTPATIENT
Start: 2023-05-20 | End: 2023-05-20

## 2023-05-20 RX ORDER — 0.9 % SODIUM CHLORIDE 0.9 %
1000 INTRAVENOUS SOLUTION INTRAVENOUS ONCE
Status: COMPLETED | OUTPATIENT
Start: 2023-05-20 | End: 2023-05-20

## 2023-05-20 RX ORDER — CHLORDIAZEPOXIDE HYDROCHLORIDE 5 MG/1
10 CAPSULE, GELATIN COATED ORAL ONCE
Status: COMPLETED | OUTPATIENT
Start: 2023-05-20 | End: 2023-05-20

## 2023-05-20 RX ADMIN — CHLORDIAZEPOXIDE HYDROCHLORIDE 10 MG: 5 CAPSULE ORAL at 15:42

## 2023-05-20 RX ADMIN — THIAMINE HCL TAB 100 MG 100 MG: 100 TAB at 15:41

## 2023-05-20 RX ADMIN — FOLIC ACID 1 MG: 1 TABLET ORAL at 15:41

## 2023-05-20 RX ADMIN — ONDANSETRON 4 MG: 2 INJECTION INTRAMUSCULAR; INTRAVENOUS at 15:41

## 2023-05-20 RX ADMIN — SODIUM CHLORIDE 1000 ML: 9 INJECTION, SOLUTION INTRAVENOUS at 15:41

## 2023-05-20 RX ADMIN — THERA TABS 1 TABLET: TAB at 15:41

## 2023-05-20 ASSESSMENT — PAIN SCALES - GENERAL: PAINLEVEL_OUTOF10: 3

## 2023-05-20 ASSESSMENT — PAIN - FUNCTIONAL ASSESSMENT: PAIN_FUNCTIONAL_ASSESSMENT: 0-10

## 2023-05-20 NOTE — ED PROVIDER NOTES
Course   - I am the first and primary provider for this patient AND AM THE PRIMARY PROVIDER OF RECORD. - I reviewed the vital signs, available nursing notes, past medical history, past surgical history, family history and social history. - Initial assessment performed. The patients presenting problems have been discussed, and the staff are in agreement with the care plan formulated and outlined with them. I have encouraged them to ask questions as they arise throughout their visit. Vital Signs-Reviewed the patient's vital signs. Vitals:    05/20/23 1402 05/20/23 1545   BP: 114/70 112/65   Pulse: 91 78   Resp: 18 16   Temp: 98.7 °F (37.1 °C)    TempSrc: Oral    SpO2: 99% 100%   Weight: 72.6 kg (160 lb)    Height: 1.676 m (5' 6\")              Records Reviewed:   Nursing Notes, available previous labs, imaging and medical records have been reviewed (if available). Additional Considerations:    N/A    Provider Notes (Medical Decision Making):     Mary Jo Rajan is a 24 y.o. female with noted past medical history,who presented to the Emergency department with a chief complaint of Alcohol Problem              MDM  Number of Diagnoses or Management Options  Alcohol abuse  Alcohol withdrawal syndrome without complication ([facility]  Diagnosis management comments: 28-year-old female with complaints of alcohol abuse and concern for alcohol detox. She says she does not want to do inpatient and she has plans to follow-up outpatient. No SI or HI.      DDx: Alcohol abuse, alcohol intoxication, alcohol withdrawal, acute liver injury, liver failure, head trauma, dehydration, vitamin deficiency, hypokalemia, hyponatremia, etc.             ED Course:         ED Course as of 05/20/23 1716   Sat May 20, 2023   1601 CBC is normal with no signs of significant anemia. CMP/BMP is normal with no sign of significant alteration in kidney or liver function or significant hyponatremia or hypo or hyperkalemia.    [VERONICA]   1700 Labs

## 2023-05-20 NOTE — ED TRIAGE NOTES
Pt arrives to ED wanting etoh detox. Pt states she is in recovery from etoh abuse x 1 year but she relapsed and has been on a 48 hr binger. She also drank a 4-5 ounce bottle of vodka PTA. States she typically drinks liquor. She has a therapist and medication for mental health depression and states she wants to detox safely and continue her OP journey.

## 2023-05-20 NOTE — DISCHARGE INSTRUCTIONS
You should not drink any alcohol while taking the Librium as this could cause serious complications such as causing you to stop breathing, overdose or even death. Follow-up with the resources that you have if you feel worse including development of suicidal ideations, severe withdrawal symptoms or any worsening you should return. Thank you for allowing us to provide you with excellent care today. We hope we addressed all of your concerns and needs. We strive to provide excellent quality care in the Emergency Department. Anything less than excellent does not meet our expectations for you. Incidental findings are findings on labs or imaging studies that do not necessarily correlate with the reason for your visit. We make every effort to inform you of these incidental findings and the proper follow-up, however it is important that you call your primary care doctor or a primary care doctor in 1 to 2 days to set up a follow-up visit so they can go through your results in detail with you, and determine if additional testing is needed. The emergency room is not intended to be complete or comprehensive care, and it serves as a means to rule out life-threatening pathologies. It is very important that you follow-up with your primary care doctor to arrange additional testing and/or treatment if needed. The exam and treatment you received in the Emergency Department were for an urgent problem and are not intended as complete care. It is important that you follow-up with a doctor, nurse practitioner, or physician assistant to:  (1) confirm your diagnosis,  (2) re-evaluation of changes in your illness and treatment, and  (3) for ongoing care. If your symptoms become worse or you do not improve as expected, or you develop any new symptoms that concern you and/or you are unable to reach your usual health care provider, you should return to the Emergency Department. We are available 24 hours a day.      If your

## 2023-05-24 ENCOUNTER — HOSPITAL ENCOUNTER (INPATIENT)
Facility: HOSPITAL | Age: 28
LOS: 1 days | Discharge: HOME | DRG: 897 | End: 2023-05-25
Attending: EMERGENCY MEDICINE | Admitting: HOSPITALIST
Payer: OTHER GOVERNMENT

## 2023-05-24 DIAGNOSIS — Z01.89 ENCOUNTER FOR LABORATORY TEST: ICD-10-CM

## 2023-05-24 DIAGNOSIS — Z87.19 HISTORY OF LIVER DISEASE: ICD-10-CM

## 2023-05-24 DIAGNOSIS — F10.930 ALCOHOL WITHDRAWAL SYNDROME WITHOUT COMPLICATION (HCC): Primary | ICD-10-CM

## 2023-05-24 PROBLEM — Z78.9 WITHDRAWN FROM ALCOHOL DETOXIFICATION PROGRAM: Status: ACTIVE | Noted: 2023-05-24

## 2023-05-24 LAB
ALBUMIN SERPL-MCNC: 3.9 G/DL (ref 3.5–5)
ALBUMIN/GLOB SERPL: 1 (ref 1.1–2.2)
ALP SERPL-CCNC: 86 U/L (ref 45–117)
ALT SERPL-CCNC: 37 U/L (ref 12–78)
ANION GAP SERPL CALC-SCNC: 4 MMOL/L (ref 5–15)
AST SERPL W P-5'-P-CCNC: 50 U/L (ref 15–37)
BILIRUB SERPL-MCNC: 1 MG/DL (ref 0.2–1)
BUN SERPL-MCNC: 14 MG/DL (ref 6–20)
BUN/CREAT SERPL: 16 (ref 12–20)
CA-I BLD-MCNC: 8.4 MG/DL (ref 8.5–10.1)
CHLORIDE SERPL-SCNC: 107 MMOL/L (ref 97–108)
CO2 SERPL-SCNC: 29 MMOL/L (ref 21–32)
CREAT SERPL-MCNC: 0.86 MG/DL (ref 0.55–1.02)
ERYTHROCYTE [DISTWIDTH] IN BLOOD BY AUTOMATED COUNT: 13.1 % (ref 11.5–14.5)
GLOBULIN SER CALC-MCNC: 4.1 G/DL (ref 2–4)
GLUCOSE SERPL-MCNC: 87 MG/DL (ref 65–100)
HCT VFR BLD AUTO: 38.9 % (ref 35–47)
HGB BLD-MCNC: 12.6 G/DL (ref 11.5–16)
MCH RBC QN AUTO: 27.6 PG (ref 26–34)
MCHC RBC AUTO-ENTMCNC: 32.4 G/DL (ref 30–36.5)
MCV RBC AUTO: 85.1 FL (ref 80–99)
NRBC # BLD: 0 K/UL (ref 0–0.01)
NRBC BLD-RTO: 0 PER 100 WBC
PLATELET # BLD AUTO: 194 K/UL (ref 150–400)
PMV BLD AUTO: 9.8 FL (ref 8.9–12.9)
POTASSIUM SERPL-SCNC: 3.6 MMOL/L (ref 3.5–5.1)
PROT SERPL-MCNC: 8 G/DL (ref 6.4–8.2)
RBC # BLD AUTO: 4.57 M/UL (ref 3.8–5.2)
SODIUM SERPL-SCNC: 140 MMOL/L (ref 136–145)
WBC # BLD AUTO: 12.4 K/UL (ref 3.6–11)

## 2023-05-24 PROCEDURE — 1100000000 HC RM PRIVATE

## 2023-05-24 PROCEDURE — 2500000003 HC RX 250 WO HCPCS: Performed by: HOSPITALIST

## 2023-05-24 PROCEDURE — 6360000002 HC RX W HCPCS: Performed by: HOSPITALIST

## 2023-05-24 PROCEDURE — 6370000000 HC RX 637 (ALT 250 FOR IP): Performed by: HOSPITALIST

## 2023-05-24 PROCEDURE — 36415 COLL VENOUS BLD VENIPUNCTURE: CPT

## 2023-05-24 PROCEDURE — 2580000003 HC RX 258: Performed by: HOSPITALIST

## 2023-05-24 PROCEDURE — 99285 EMERGENCY DEPT VISIT HI MDM: CPT

## 2023-05-24 PROCEDURE — 85027 COMPLETE CBC AUTOMATED: CPT

## 2023-05-24 PROCEDURE — 80053 COMPREHEN METABOLIC PANEL: CPT

## 2023-05-24 RX ORDER — LORAZEPAM 1 MG/1
4 TABLET ORAL
Status: DISCONTINUED | OUTPATIENT
Start: 2023-05-24 | End: 2023-05-25 | Stop reason: HOSPADM

## 2023-05-24 RX ORDER — CHLORDIAZEPOXIDE HYDROCHLORIDE 25 MG/1
50 CAPSULE, GELATIN COATED ORAL EVERY 6 HOURS PRN
Status: DISCONTINUED | OUTPATIENT
Start: 2023-05-24 | End: 2023-05-25 | Stop reason: HOSPADM

## 2023-05-24 RX ORDER — LORAZEPAM 1 MG/1
1 TABLET ORAL
Status: DISCONTINUED | OUTPATIENT
Start: 2023-05-24 | End: 2023-05-25 | Stop reason: HOSPADM

## 2023-05-24 RX ORDER — BUSPIRONE HYDROCHLORIDE 5 MG/1
7.5 TABLET ORAL 2 TIMES DAILY
Status: DISCONTINUED | OUTPATIENT
Start: 2023-05-24 | End: 2023-05-25 | Stop reason: HOSPADM

## 2023-05-24 RX ORDER — ACETAMINOPHEN 650 MG/1
650 SUPPOSITORY RECTAL EVERY 6 HOURS PRN
Status: DISCONTINUED | OUTPATIENT
Start: 2023-05-24 | End: 2023-05-25 | Stop reason: HOSPADM

## 2023-05-24 RX ORDER — SODIUM CHLORIDE 9 MG/ML
INJECTION, SOLUTION INTRAVENOUS PRN
Status: DISCONTINUED | OUTPATIENT
Start: 2023-05-24 | End: 2023-05-25 | Stop reason: HOSPADM

## 2023-05-24 RX ORDER — SPIRONOLACTONE 25 MG/1
50 TABLET ORAL DAILY
Status: DISCONTINUED | OUTPATIENT
Start: 2023-05-24 | End: 2023-05-24

## 2023-05-24 RX ORDER — ONDANSETRON 4 MG/1
4 TABLET, ORALLY DISINTEGRATING ORAL EVERY 8 HOURS PRN
Status: DISCONTINUED | OUTPATIENT
Start: 2023-05-24 | End: 2023-05-25 | Stop reason: HOSPADM

## 2023-05-24 RX ORDER — VENLAFAXINE HYDROCHLORIDE 75 MG/1
150 CAPSULE, EXTENDED RELEASE ORAL DAILY
Status: DISCONTINUED | OUTPATIENT
Start: 2023-05-24 | End: 2023-05-25 | Stop reason: HOSPADM

## 2023-05-24 RX ORDER — ENOXAPARIN SODIUM 100 MG/ML
40 INJECTION SUBCUTANEOUS DAILY
Status: DISCONTINUED | OUTPATIENT
Start: 2023-05-24 | End: 2023-05-25 | Stop reason: HOSPADM

## 2023-05-24 RX ORDER — LORAZEPAM 1 MG/1
2 TABLET ORAL
Status: DISCONTINUED | OUTPATIENT
Start: 2023-05-24 | End: 2023-05-25 | Stop reason: HOSPADM

## 2023-05-24 RX ORDER — POLYETHYLENE GLYCOL 3350 17 G/17G
17 POWDER, FOR SOLUTION ORAL DAILY PRN
Status: DISCONTINUED | OUTPATIENT
Start: 2023-05-24 | End: 2023-05-25 | Stop reason: HOSPADM

## 2023-05-24 RX ORDER — SODIUM CHLORIDE 9 MG/ML
INJECTION, SOLUTION INTRAVENOUS CONTINUOUS
Status: DISCONTINUED | OUTPATIENT
Start: 2023-05-24 | End: 2023-05-25

## 2023-05-24 RX ORDER — LORAZEPAM 2 MG/ML
4 INJECTION INTRAMUSCULAR
Status: DISCONTINUED | OUTPATIENT
Start: 2023-05-24 | End: 2023-05-25 | Stop reason: HOSPADM

## 2023-05-24 RX ORDER — FOLIC ACID 1 MG/1
1 TABLET ORAL DAILY
Status: DISCONTINUED | OUTPATIENT
Start: 2023-05-24 | End: 2023-05-25 | Stop reason: HOSPADM

## 2023-05-24 RX ORDER — NICOTINE 21 MG/24HR
1 PATCH, TRANSDERMAL 24 HOURS TRANSDERMAL DAILY PRN
Status: DISCONTINUED | OUTPATIENT
Start: 2023-05-24 | End: 2023-05-25 | Stop reason: HOSPADM

## 2023-05-24 RX ORDER — LORAZEPAM 1 MG/1
3 TABLET ORAL
Status: DISCONTINUED | OUTPATIENT
Start: 2023-05-24 | End: 2023-05-25 | Stop reason: HOSPADM

## 2023-05-24 RX ORDER — MULTIVITAMIN WITH IRON
1 TABLET ORAL DAILY
Status: DISCONTINUED | OUTPATIENT
Start: 2023-05-24 | End: 2023-05-24 | Stop reason: RX

## 2023-05-24 RX ORDER — PROPRANOLOL HYDROCHLORIDE 10 MG/1
10 TABLET ORAL 2 TIMES DAILY
Status: DISCONTINUED | OUTPATIENT
Start: 2023-05-24 | End: 2023-05-25 | Stop reason: HOSPADM

## 2023-05-24 RX ORDER — VENLAFAXINE HYDROCHLORIDE 75 MG/1
75 CAPSULE, EXTENDED RELEASE ORAL DAILY
Status: DISCONTINUED | OUTPATIENT
Start: 2023-05-24 | End: 2023-05-24

## 2023-05-24 RX ORDER — LORAZEPAM 2 MG/ML
2 INJECTION INTRAMUSCULAR
Status: DISCONTINUED | OUTPATIENT
Start: 2023-05-24 | End: 2023-05-25 | Stop reason: HOSPADM

## 2023-05-24 RX ORDER — VENLAFAXINE HYDROCHLORIDE 75 MG/1
150 CAPSULE, EXTENDED RELEASE ORAL DAILY
Status: DISCONTINUED | OUTPATIENT
Start: 2023-05-24 | End: 2023-05-24

## 2023-05-24 RX ORDER — SODIUM CHLORIDE 0.9 % (FLUSH) 0.9 %
5-40 SYRINGE (ML) INJECTION PRN
Status: DISCONTINUED | OUTPATIENT
Start: 2023-05-24 | End: 2023-05-25 | Stop reason: HOSPADM

## 2023-05-24 RX ORDER — SODIUM CHLORIDE 0.9 % (FLUSH) 0.9 %
5-40 SYRINGE (ML) INJECTION EVERY 12 HOURS SCHEDULED
Status: DISCONTINUED | OUTPATIENT
Start: 2023-05-24 | End: 2023-05-25 | Stop reason: HOSPADM

## 2023-05-24 RX ORDER — LORAZEPAM 2 MG/ML
1 INJECTION INTRAMUSCULAR
Status: DISCONTINUED | OUTPATIENT
Start: 2023-05-24 | End: 2023-05-25 | Stop reason: HOSPADM

## 2023-05-24 RX ORDER — VENLAFAXINE HYDROCHLORIDE 75 MG/1
75 CAPSULE, EXTENDED RELEASE ORAL DAILY
Status: DISCONTINUED | OUTPATIENT
Start: 2023-05-24 | End: 2023-05-25 | Stop reason: HOSPADM

## 2023-05-24 RX ORDER — ACETAMINOPHEN 325 MG/1
650 TABLET ORAL EVERY 6 HOURS PRN
Status: DISCONTINUED | OUTPATIENT
Start: 2023-05-24 | End: 2023-05-25 | Stop reason: HOSPADM

## 2023-05-24 RX ORDER — LORAZEPAM 2 MG/ML
3 INJECTION INTRAMUSCULAR
Status: DISCONTINUED | OUTPATIENT
Start: 2023-05-24 | End: 2023-05-25 | Stop reason: HOSPADM

## 2023-05-24 RX ORDER — CHLORDIAZEPOXIDE HYDROCHLORIDE 25 MG/1
25 CAPSULE, GELATIN COATED ORAL EVERY 6 HOURS
Status: DISCONTINUED | OUTPATIENT
Start: 2023-05-24 | End: 2023-05-25 | Stop reason: HOSPADM

## 2023-05-24 RX ORDER — DOXYCYCLINE 50 MG/1
100 CAPSULE ORAL DAILY
Status: DISCONTINUED | OUTPATIENT
Start: 2023-05-24 | End: 2023-05-25 | Stop reason: HOSPADM

## 2023-05-24 RX ORDER — MULTIVITAMIN,THERAPEUTIC
1 TABLET ORAL DAILY
Status: DISCONTINUED | OUTPATIENT
Start: 2023-05-24 | End: 2023-05-25 | Stop reason: HOSPADM

## 2023-05-24 RX ORDER — ONDANSETRON 2 MG/ML
4 INJECTION INTRAMUSCULAR; INTRAVENOUS EVERY 6 HOURS PRN
Status: DISCONTINUED | OUTPATIENT
Start: 2023-05-24 | End: 2023-05-25 | Stop reason: HOSPADM

## 2023-05-24 RX ORDER — GABAPENTIN 100 MG/1
100 CAPSULE ORAL 2 TIMES DAILY
Status: DISCONTINUED | OUTPATIENT
Start: 2023-05-24 | End: 2023-05-25 | Stop reason: HOSPADM

## 2023-05-24 RX ORDER — PANTOPRAZOLE SODIUM 20 MG/1
20 TABLET, DELAYED RELEASE ORAL DAILY
COMMUNITY

## 2023-05-24 RX ADMIN — ENOXAPARIN SODIUM 40 MG: 100 INJECTION SUBCUTANEOUS at 13:48

## 2023-05-24 RX ADMIN — SODIUM CHLORIDE, PRESERVATIVE FREE 20 MG: 5 INJECTION INTRAVENOUS at 13:46

## 2023-05-24 RX ADMIN — Medication 10 ML: at 21:24

## 2023-05-24 RX ADMIN — VENLAFAXINE HYDROCHLORIDE 75 MG: 75 CAPSULE, EXTENDED RELEASE ORAL at 22:01

## 2023-05-24 RX ADMIN — FOLIC ACID 1 MG: 1 TABLET ORAL at 13:48

## 2023-05-24 RX ADMIN — GABAPENTIN 100 MG: 100 CAPSULE ORAL at 20:48

## 2023-05-24 RX ADMIN — VENLAFAXINE HYDROCHLORIDE 150 MG: 75 CAPSULE, EXTENDED RELEASE ORAL at 22:01

## 2023-05-24 RX ADMIN — CHLORDIAZEPOXIDE HYDROCHLORIDE 25 MG: 25 CAPSULE ORAL at 20:49

## 2023-05-24 RX ADMIN — SODIUM CHLORIDE: 9 INJECTION, SOLUTION INTRAVENOUS at 14:19

## 2023-05-24 RX ADMIN — SODIUM CHLORIDE, PRESERVATIVE FREE 20 MG: 5 INJECTION INTRAVENOUS at 20:50

## 2023-05-24 RX ADMIN — GABAPENTIN 100 MG: 100 CAPSULE ORAL at 13:49

## 2023-05-24 RX ADMIN — THIAMINE HYDROCHLORIDE 100 MG: 100 INJECTION, SOLUTION INTRAMUSCULAR; INTRAVENOUS at 21:26

## 2023-05-24 RX ADMIN — THERA TABS 1 TABLET: TAB at 13:49

## 2023-05-24 RX ADMIN — SODIUM CHLORIDE, PRESERVATIVE FREE 10 ML: 5 INJECTION INTRAVENOUS at 21:23

## 2023-05-24 RX ADMIN — SODIUM CHLORIDE, PRESERVATIVE FREE 10 ML: 5 INJECTION INTRAVENOUS at 20:54

## 2023-05-24 RX ADMIN — BUSPIRONE HYDROCHLORIDE 7.5 MG: 5 TABLET ORAL at 20:48

## 2023-05-24 RX ADMIN — CHLORDIAZEPOXIDE HYDROCHLORIDE 25 MG: 25 CAPSULE ORAL at 13:48

## 2023-05-24 RX ADMIN — THIAMINE HYDROCHLORIDE 100 MG: 100 INJECTION, SOLUTION INTRAMUSCULAR; INTRAVENOUS at 14:19

## 2023-05-24 RX ADMIN — PROPRANOLOL HYDROCHLORIDE 10 MG: 10 TABLET ORAL at 20:48

## 2023-05-24 ASSESSMENT — PAIN DESCRIPTION - PAIN TYPE: TYPE: ACUTE PAIN

## 2023-05-24 ASSESSMENT — PAIN DESCRIPTION - DESCRIPTORS: DESCRIPTORS: ACHING

## 2023-05-24 ASSESSMENT — PAIN SCALES - GENERAL
PAINLEVEL_OUTOF10: 0
PAINLEVEL_OUTOF10: 3

## 2023-05-24 ASSESSMENT — LIFESTYLE VARIABLES
HOW MANY STANDARD DRINKS CONTAINING ALCOHOL DO YOU HAVE ON A TYPICAL DAY: 10 OR MORE
HOW OFTEN DO YOU HAVE A DRINK CONTAINING ALCOHOL: 4 OR MORE TIMES A WEEK

## 2023-05-24 ASSESSMENT — PAIN DESCRIPTION - LOCATION: LOCATION: HEAD

## 2023-05-24 ASSESSMENT — PAIN DESCRIPTION - ORIENTATION: ORIENTATION: ANTERIOR

## 2023-05-24 ASSESSMENT — PAIN - FUNCTIONAL ASSESSMENT
PAIN_FUNCTIONAL_ASSESSMENT: ACTIVITIES ARE NOT PREVENTED
PAIN_FUNCTIONAL_ASSESSMENT: NONE - DENIES PAIN

## 2023-05-24 NOTE — DISCHARGE INSTRUCTIONS
Your lab work evaluating your liver function test all came back normal.  All of your lab work is unremarkable and you are safe for your alcohol detox program.       Thank you! Thank you for allowing me to care for you in the emergency department. It is my goal to provide you with excellent care. If you have not received excellent quality care, please ask to speak to the nurse manager. Please fill out the survey that will come to you by mail or email since we listen to your feedback! Below you will find a list of your tests from today's visit. Should you have any questions, please do not hesitate to call the emergency department.     Labs  Recent Results (from the past 12 hour(s))   CBC    Collection Time: 05/24/23 11:19 AM   Result Value Ref Range    WBC 12.4 (H) 3.6 - 11.0 K/uL    RBC 4.57 3.80 - 5.20 M/uL    Hemoglobin 12.6 11.5 - 16.0 g/dL    Hematocrit 38.9 35.0 - 47.0 %    MCV 85.1 80.0 - 99.0 FL    MCH 27.6 26.0 - 34.0 PG    MCHC 32.4 30.0 - 36.5 g/dL    RDW 13.1 11.5 - 14.5 %    Platelets 140 746 - 558 K/uL    MPV 9.8 8.9 - 12.9 FL    Nucleated RBCs 0.0 0.0  WBC    nRBC 0.00 0.00 - 0.01 K/uL   Comprehensive Metabolic Panel    Collection Time: 05/24/23 11:19 AM   Result Value Ref Range    Sodium 140 136 - 145 mmol/L    Potassium 3.6 3.5 - 5.1 mmol/L    Chloride 107 97 - 108 mmol/L    CO2 29 21 - 32 mmol/L    Anion Gap 4 (L) 5 - 15 mmol/L    Glucose 87 65 - 100 mg/dL    BUN 14 6 - 20 mg/dL    Creatinine 0.86 0.55 - 1.02 mg/dL    Bun/Cre Ratio 16 12 - 20      Est, Glom Filt Rate >60 >60 ml/min/1.73m2    Calcium 8.4 (L) 8.5 - 10.1 mg/dL    Total Bilirubin 1.0 0.2 - 1.0 mg/dL    AST 50 (H) 15 - 37 U/L    ALT 37 12 - 78 U/L    Alk Phosphatase 86 45 - 117 U/L    Total Protein 8.0 6.4 - 8.2 g/dL    Albumin 3.9 3.5 - 5.0 g/dL    Globulin 4.1 (H) 2.0 - 4.0 g/dL    Albumin/Globulin Ratio 1.0 (L) 1.1 - 2.2         Radiologic Studies  No orders to display

## 2023-05-24 NOTE — ED PROVIDER NOTES
[facility]  EMERGENCY DEPARTMENT HISTORY AND PHYSICAL EXAM      Date: 5/24/2023  Patient Name: Roya Jones  MRN: [id number]  [place]: 1995  Date of evaluation: 5/24/2023  Provider: Napoleon Bosworth, MD   Note Started: 11:10 AM EDT 5/24/23    HISTORY OF PRESENT ILLNESS     Chief Complaint   Patient presents with    Alcohol Problem       History Provided By: Patient    HPI: Roya Jones is a 24 y.o. female with a history of alcoholic hepatitis, alcohol dependence, presenting for screening labs for alcohol detox program.  Patient states that she is planning on going to the [facility] after this but because of her history of liver disease due to alcoholism, they requested that she get screening lab work checking her liver. Patient denies any chest pain, abdominal pain, urinary issues. States that she does have some nausea as she is currently sobering up. Has the shakes every so often.     PAST MEDICAL HISTORY   Past Medical History:  Past Medical History:   Diagnosis Date    Anemia     Anxiety     Chronic kidney disease     Chronic pain     Contact dermatitis and eczema due to cause     Depression     Liver disease     Polycystic ovarian syndrome        Past Surgical History:  Past Surgical History:   Procedure Laterality Date    ADENOIDECTOMY  01/01/2001    CHOLECYSTECTOMY  10/2022    COLONOSCOPY      TONSILLECTOMY  01/01/2010    UPPER GASTROINTESTINAL ENDOSCOPY      WISDOM TOOTH EXTRACTION         Family History:  Family History   Problem Relation Age of Onset    Hypertension Neg Hx     Heart Disease Neg Hx     No Known Problems Paternal Grandfather     No Known Problems Paternal Grandmother     No Known Problems Brother     Alcohol Abuse Father     Depression Mother     Cancer Maternal Grandmother         skin    Other Maternal Grandmother         macular degeneration    Diabetes Maternal Grandfather     Atopy Sister        Social History:  Social History     Tobacco Use    Smoking status:

## 2023-05-24 NOTE — ED TRIAGE NOTES
Pt arrives to ED with complaints of detoxing from alcohol, states she's trying to get into rehab but must be medically cleared by the ER before being accepted there.  Last drink of vodka was recently, a few hours go per pt

## 2023-05-24 NOTE — H&P
Admission History and Physical      NAME:  Migdalia Gutiérrez   :   1995   MRN:  [id number]     PCP:  Mary Hilliard MD     Date/Time:  2023           Subjective:     CHIEF COMPLAINT: Alcohol detox    HISTORY OF PRESENT ILLNESS:     Ms. Gibran Jama is a 24 y.o. female history of known alcohol use and alcoholic hepatitis. Patient came in for alcohol detox before being admitted to alcohol rehab facility. Patient drinks vodka every day last drink was earlier today. Patient claims history of withdrawal symptoms while getting detox. Otherwise denies any rectal bleed, nausea, vomiting, headache, blurred vision, tingling or numbness. Patient claims to be on doxycycline for acne flareup. Patient also claims history of depression but denies active HI or SI ideations. ED called and spoke with psychiatry who recommended admitting to medical side.     Past Medical History:   Diagnosis Date    Anemia     Anxiety     Chronic kidney disease     Chronic pain     Contact dermatitis and eczema due to cause     Depression     Liver disease     Polycystic ovarian syndrome         Past Surgical History:   Procedure Laterality Date    ADENOIDECTOMY  2001    CHOLECYSTECTOMY  10/2022    COLONOSCOPY      TONSILLECTOMY  2010    UPPER GASTROINTESTINAL ENDOSCOPY      WISDOM TOOTH EXTRACTION         Social History     Tobacco Use    Smoking status: Former     Packs/day: 0.50     Types: Cigarettes     Quit date: 2021     Years since quittin.4    Smokeless tobacco: Current   Substance Use Topics    Alcohol use: Yes        Family History   Problem Relation Age of Onset    Hypertension Neg Hx     Heart Disease Neg Hx     No Known Problems Paternal Grandfather     No Known Problems Paternal Grandmother     No Known Problems Brother     Alcohol Abuse Father     Depression Mother     Cancer Maternal Grandmother         skin    Other Maternal Grandmother         macular degeneration    Diabetes Maternal Grandfather

## 2023-05-25 VITALS
DIASTOLIC BLOOD PRESSURE: 74 MMHG | RESPIRATION RATE: 15 BRPM | HEART RATE: 72 BPM | HEIGHT: 66 IN | SYSTOLIC BLOOD PRESSURE: 119 MMHG | WEIGHT: 177.25 LBS | TEMPERATURE: 98.6 F | BODY MASS INDEX: 28.49 KG/M2 | OXYGEN SATURATION: 99 %

## 2023-05-25 PROBLEM — F32.A DEPRESSION: Status: ACTIVE | Noted: 2023-03-06

## 2023-05-25 PROBLEM — F10.90 ALCOHOL USE DISORDER: Status: ACTIVE | Noted: 2023-05-25

## 2023-05-25 LAB
BASOPHILS # BLD: 0 K/UL (ref 0–0.1)
BASOPHILS NFR BLD: 1 % (ref 0–1)
DIFFERENTIAL METHOD BLD: ABNORMAL
EOSINOPHIL # BLD: 0 K/UL (ref 0–0.4)
EOSINOPHIL NFR BLD: 1 % (ref 0–7)
ERYTHROCYTE [DISTWIDTH] IN BLOOD BY AUTOMATED COUNT: 12.7 % (ref 11.5–14.5)
HCT VFR BLD AUTO: 31.5 % (ref 35–47)
HGB BLD-MCNC: 10.1 G/DL (ref 11.5–16)
IMM GRANULOCYTES # BLD AUTO: 0 K/UL (ref 0–0.04)
IMM GRANULOCYTES NFR BLD AUTO: 0 % (ref 0–0.5)
INR PPP: 1.4 (ref 0.9–1.1)
LYMPHOCYTES # BLD: 0.8 K/UL (ref 0.8–3.5)
LYMPHOCYTES NFR BLD: 24 % (ref 12–49)
MCH RBC QN AUTO: 27.6 PG (ref 26–34)
MCHC RBC AUTO-ENTMCNC: 32.1 G/DL (ref 30–36.5)
MCV RBC AUTO: 86.1 FL (ref 80–99)
MONOCYTES # BLD: 0.3 K/UL (ref 0–1)
MONOCYTES NFR BLD: 9 % (ref 5–13)
NEUTS SEG # BLD: 2.1 K/UL (ref 1.8–8)
NEUTS SEG NFR BLD: 65 % (ref 32–75)
NRBC # BLD: 0 K/UL (ref 0–0.01)
NRBC BLD-RTO: 0 PER 100 WBC
PLATELET # BLD AUTO: 95 K/UL (ref 150–400)
PMV BLD AUTO: 10.7 FL (ref 8.9–12.9)
PROTHROMBIN TIME: 17 SEC (ref 11.9–14.6)
RBC # BLD AUTO: 3.66 M/UL (ref 3.8–5.2)
WBC # BLD AUTO: 3.3 K/UL (ref 3.6–11)

## 2023-05-25 PROCEDURE — 80053 COMPREHEN METABOLIC PANEL: CPT

## 2023-05-25 PROCEDURE — 6360000002 HC RX W HCPCS: Performed by: HOSPITALIST

## 2023-05-25 PROCEDURE — 85610 PROTHROMBIN TIME: CPT

## 2023-05-25 PROCEDURE — 6370000000 HC RX 637 (ALT 250 FOR IP): Performed by: HOSPITALIST

## 2023-05-25 PROCEDURE — 36415 COLL VENOUS BLD VENIPUNCTURE: CPT

## 2023-05-25 PROCEDURE — 2500000003 HC RX 250 WO HCPCS: Performed by: HOSPITALIST

## 2023-05-25 PROCEDURE — 2580000003 HC RX 258: Performed by: HOSPITALIST

## 2023-05-25 PROCEDURE — 85025 COMPLETE CBC W/AUTO DIFF WBC: CPT

## 2023-05-25 RX ORDER — CLONIDINE HYDROCHLORIDE 0.1 MG/1
1 TABLET ORAL 2 TIMES DAILY
COMMUNITY
Start: 2023-04-29 | End: 2023-07-24

## 2023-05-25 RX ORDER — HYDROXYZINE PAMOATE 25 MG/1
25 CAPSULE ORAL 3 TIMES DAILY PRN
Status: DISCONTINUED | OUTPATIENT
Start: 2023-05-25 | End: 2023-05-25 | Stop reason: HOSPADM

## 2023-05-25 RX ORDER — HYDROXYZINE PAMOATE 25 MG/1
25 CAPSULE ORAL 3 TIMES DAILY PRN
Qty: 21 CAPSULE | Refills: 0 | Status: SHIPPED | OUTPATIENT
Start: 2023-05-25 | End: 2023-06-01

## 2023-05-25 RX ADMIN — SODIUM CHLORIDE, PRESERVATIVE FREE 10 ML: 5 INJECTION INTRAVENOUS at 10:45

## 2023-05-25 RX ADMIN — SODIUM CHLORIDE, PRESERVATIVE FREE 20 MG: 5 INJECTION INTRAVENOUS at 09:30

## 2023-05-25 RX ADMIN — SODIUM CHLORIDE, PRESERVATIVE FREE 10 ML: 5 INJECTION INTRAVENOUS at 09:33

## 2023-05-25 RX ADMIN — DOXYCYCLINE 100 MG: 50 CAPSULE ORAL at 09:31

## 2023-05-25 RX ADMIN — CHLORDIAZEPOXIDE HYDROCHLORIDE 25 MG: 25 CAPSULE ORAL at 03:06

## 2023-05-25 RX ADMIN — THIAMINE HYDROCHLORIDE 100 MG: 100 INJECTION, SOLUTION INTRAMUSCULAR; INTRAVENOUS at 09:32

## 2023-05-25 RX ADMIN — ENOXAPARIN SODIUM 40 MG: 100 INJECTION SUBCUTANEOUS at 09:31

## 2023-05-25 RX ADMIN — THERA TABS 1 TABLET: TAB at 09:31

## 2023-05-25 RX ADMIN — CHLORDIAZEPOXIDE HYDROCHLORIDE 25 MG: 25 CAPSULE ORAL at 13:13

## 2023-05-25 RX ADMIN — PROPRANOLOL HYDROCHLORIDE 10 MG: 10 TABLET ORAL at 09:30

## 2023-05-25 RX ADMIN — GABAPENTIN 100 MG: 100 CAPSULE ORAL at 09:30

## 2023-05-25 RX ADMIN — FOLIC ACID 1 MG: 1 TABLET ORAL at 09:30

## 2023-05-25 RX ADMIN — BUSPIRONE HYDROCHLORIDE 7.5 MG: 5 TABLET ORAL at 09:29

## 2023-05-25 RX ADMIN — Medication 10 ML: at 10:16

## 2023-05-25 RX ADMIN — CHLORDIAZEPOXIDE HYDROCHLORIDE 25 MG: 25 CAPSULE ORAL at 09:29

## 2023-05-25 ASSESSMENT — PAIN SCALES - GENERAL
PAINLEVEL_OUTOF10: 0

## 2023-05-25 NOTE — PROGRESS NOTES
Discharge Education And information given to the patient. IV site removed. Patient took all personal belongs with her. Patient understood all education.    Nurse accompanied patient to the front of the hospital

## 2023-05-25 NOTE — DISCHARGE SUMMARY
0.0 - 0.4 K/UL    Basophils Absolute 0.0 0.0 - 0.1 K/UL    Absolute Immature Granulocyte 0.0 0.00 - 0.04 K/UL    Differential Type AUTOMATED     Protime-INR    Collection Time: 05/25/23 10:10 AM   Result Value Ref Range    Protime 17.0 (H) 11.9 - 14.6 sec    INR 1.4 (H) 0.9 - 1.1       No orders to display       Discharge: time spent 35 minutes in discharge  Education and counseling.      Signed:  WIL Chacon NP  5/25/2023  3:23 PM

## 2023-05-25 NOTE — CARE COORDINATION
05/25/23 0950   Service Assessment   Patient Orientation Alert and Oriented   Cognition Alert   History Provided By Patient   Primary Caregiver Self   Accompanied By/Relationship No one present with patient   Support Systems Parent;Spouse/Significant Other  (Boyfriend: Milly Ling)   PCP Verified by CM Yes   Last Visit to PCP Within last 3 months   Prior Functional Level Independent in ADLs/IADLs   Current Functional Level Independent in ADLs/IADLs   Can patient return to prior living arrangement Yes   Ability to make needs known: Good   Family able to assist with home care needs: Yes   Would you like for me to discuss the discharge plan with any other family members/significant others, and if so, who? No   Financial Resources Other (Comment)  ()   Community Resources None     CM met with patient at bedside for discharge planning. [place] relayed as being a good mailing address. Patient reports she will likely go live with her dad in his two story home: unsure of number of steps to entrance. Patient asks for her emergency contact be updated to Milly Ling, her boyfriend [phone]. Patient states she has been to in and out-patient rehab as well as sees a psychologist. She declines rehab for substance abuse at this time. Medications obtained from [facility]. 1515: Discharge order home noted. Summary pending. When transportation is available, patient to discharge home. Discharge Checklist Completed. Transition of Care Plan:    RUR: 7%  Prior Level of Functioning: Independent  Disposition: Home  If SNF or IPR: Date FOC offered: N/A  Date FOC received: N/A  Accepting facility: N/A  Date authorization started with reference number: N/A  Date authorization received and expires: N/A  Follow up appointments: Discharge Summary  DME needed: No  Transportation at discharge: Family  IM/IMM Medicare/ letter given: N/A  Is patient a [place] and connected with VA?  No  If yes, was [place]

## 2023-05-26 LAB
ALBUMIN SERPL-MCNC: 3.2 G/DL (ref 3.5–5)
ALBUMIN/GLOB SERPL: 1 (ref 1.1–2.2)
ALP SERPL-CCNC: 71 U/L (ref 45–117)
ALT SERPL-CCNC: 30 U/L (ref 12–78)
ANION GAP SERPL CALC-SCNC: 3 MMOL/L (ref 5–15)
AST SERPL W P-5'-P-CCNC: 39 U/L (ref 15–37)
BILIRUB SERPL-MCNC: 1.6 MG/DL (ref 0.2–1)
BUN SERPL-MCNC: 13 MG/DL (ref 6–20)
BUN/CREAT SERPL: 17 (ref 12–20)
CA-I BLD-MCNC: 8.4 MG/DL (ref 8.5–10.1)
CHLORIDE SERPL-SCNC: 110 MMOL/L (ref 97–108)
CO2 SERPL-SCNC: 28 MMOL/L (ref 21–32)
CREAT SERPL-MCNC: 0.76 MG/DL (ref 0.55–1.02)
GLOBULIN SER CALC-MCNC: 3.2 G/DL (ref 2–4)
GLUCOSE SERPL-MCNC: 165 MG/DL (ref 65–100)
POTASSIUM SERPL-SCNC: 4.2 MMOL/L (ref 3.5–5.1)
PROT SERPL-MCNC: 6.4 G/DL (ref 6.4–8.2)
SODIUM SERPL-SCNC: 139 MMOL/L (ref 136–145)

## 2023-05-26 NOTE — CONSULTS
[address] [facility]    Name:  Tiffanie Alvarez  MR#:  [id number]  :  1995  ACCOUNT #:  [de-identified]  DATE OF SERVICE:  2023    PSYCHIATRIC CONSULTATION    I saw the patient in room 207, spoke with Dr. Leonora Munoz, saw the chart. HISTORY OF PRESENT ILLNESS:  This is a 82-year-old  but   female patient who was admitted to Dr. Teagan Mederos service, who presented to the ED wanting alcohol detox. Apparently, she was accepted to [facility] in [place] for substance abuse rehab, but they wanted detox done and clearance before she arrived there. She is in recovery for the last one year. She relapsed and has been on 48-hour binge. She also drank four to five ounce bottles of vodka prior to arrival.  The patient states she typically drinks liquor. She has a therapist, medication, and psychiatry, and taking venlafaxine up to 225 mg daily and taking naltrexone. Initially, she states she only drank for two days. When I wanted the accurate information so that we could address the detox needs, she stated maybe four days. I asked her whether she drinks on a regular basis, even small quantities, she declined. Acknowledged depression, but denies suicidal thought or homicidal thought. PAST PSYCHIATRIC HISTORY:  She was here in 2023 under Dr. Josué Faye and she was discharged on 2023. TRAUMA HISTORY:  Positive. FAMILY HISTORY:  Extensive family history of alcohol abuse and depression, high blood pressure, and heart disease. ALLERGIES TO MEDICATIONS:  SULFA ANTIBIOTIC. MENTAL STATUS EXAMINATION:  Average height, medium-built female patient, looking older than stated age, a little bit untidy, disheveled, rather weak. One time she says she has been drinking two days, three days, and four days, fluctuating, somewhat ambivalent about staying here.   Anxious, depressed, dysphoric, not suicidal, not homicidal.    LABORATORY DATA:  CBC:

## 2023-06-09 ENCOUNTER — TELEMEDICINE (OUTPATIENT)
Facility: CLINIC | Age: 28
End: 2023-06-09
Payer: OTHER GOVERNMENT

## 2023-06-09 DIAGNOSIS — R00.0 SINUS TACHYCARDIA: ICD-10-CM

## 2023-06-09 DIAGNOSIS — Z09 HOSPITAL DISCHARGE FOLLOW-UP: Primary | ICD-10-CM

## 2023-06-09 DIAGNOSIS — G62.9 NEUROPATHY: ICD-10-CM

## 2023-06-09 DIAGNOSIS — F10.939 ALCOHOL WITHDRAWAL SYNDROME WITH COMPLICATION (HCC): ICD-10-CM

## 2023-06-09 DIAGNOSIS — F33.41 RECURRENT MAJOR DEPRESSIVE DISORDER, IN PARTIAL REMISSION (HCC): ICD-10-CM

## 2023-06-09 DIAGNOSIS — F10.90 ALCOHOL USE DISORDER: ICD-10-CM

## 2023-06-09 PROCEDURE — 99214 OFFICE O/P EST MOD 30 MIN: CPT | Performed by: STUDENT IN AN ORGANIZED HEALTH CARE EDUCATION/TRAINING PROGRAM

## 2023-06-09 RX ORDER — GABAPENTIN 100 MG/1
100 CAPSULE ORAL 2 TIMES DAILY
Qty: 180 CAPSULE | Refills: 1 | Status: SHIPPED | OUTPATIENT
Start: 2023-06-09 | End: 2023-12-06

## 2023-06-09 RX ORDER — VENLAFAXINE HYDROCHLORIDE 75 MG/1
75 CAPSULE, EXTENDED RELEASE ORAL DAILY
Qty: 90 CAPSULE | Refills: 1 | Status: SHIPPED | OUTPATIENT
Start: 2023-06-09

## 2023-06-09 RX ORDER — DOXYCYCLINE HYCLATE 100 MG
100 TABLET ORAL DAILY
COMMUNITY

## 2023-06-09 RX ORDER — PROPRANOLOL HYDROCHLORIDE 10 MG/1
10 TABLET ORAL 2 TIMES DAILY
Qty: 180 TABLET | Refills: 1 | Status: SHIPPED | OUTPATIENT
Start: 2023-06-09

## 2023-06-09 SDOH — ECONOMIC STABILITY: FOOD INSECURITY: WITHIN THE PAST 12 MONTHS, THE FOOD YOU BOUGHT JUST DIDN'T LAST AND YOU DIDN'T HAVE MONEY TO GET MORE.: NEVER TRUE

## 2023-06-09 SDOH — ECONOMIC STABILITY: HOUSING INSECURITY
IN THE LAST 12 MONTHS, WAS THERE A TIME WHEN YOU DID NOT HAVE A STEADY PLACE TO SLEEP OR SLEPT IN A SHELTER (INCLUDING NOW)?: NO

## 2023-06-09 SDOH — ECONOMIC STABILITY: FOOD INSECURITY: WITHIN THE PAST 12 MONTHS, YOU WORRIED THAT YOUR FOOD WOULD RUN OUT BEFORE YOU GOT MONEY TO BUY MORE.: NEVER TRUE

## 2023-06-09 SDOH — ECONOMIC STABILITY: INCOME INSECURITY: HOW HARD IS IT FOR YOU TO PAY FOR THE VERY BASICS LIKE FOOD, HOUSING, MEDICAL CARE, AND HEATING?: NOT HARD AT ALL

## 2023-06-09 ASSESSMENT — PATIENT HEALTH QUESTIONNAIRE - PHQ9
SUM OF ALL RESPONSES TO PHQ QUESTIONS 1-9: 0
SUM OF ALL RESPONSES TO PHQ QUESTIONS 1-9: 0
SUM OF ALL RESPONSES TO PHQ9 QUESTIONS 1 & 2: 0
2. FEELING DOWN, DEPRESSED OR HOPELESS: 0
SUM OF ALL RESPONSES TO PHQ QUESTIONS 1-9: 0
SUM OF ALL RESPONSES TO PHQ QUESTIONS 1-9: 0
1. LITTLE INTEREST OR PLEASURE IN DOING THINGS: 0

## 2023-06-09 NOTE — PROGRESS NOTES
Consent: Tara Vicente, who was seen by synchronous (real-time) audio-video technology, and/or her healthcare decision maker, is aware that this patient-initiated, Telehealth encounter on 6/9/2023 is a billable service, with coverage as determined by her insurance carrier. She is aware that she may receive a bill and has provided verbal consent to proceed: YES-Consent obtained within past 12 months        Subjective:   Tara Vicente is a 24 y.o. female who was seen for Follow-Up from [facility] 5/24/23  Detox from Alcohol)  Patient went to the hospital due to alcohol withdrawal.  She has history of alcohol abuse, cirrhosis. She was discharged from the hospital on 5/25 has been doing well. Denies changing any alcohol since being discharged and following with AA. Copy of discharge summary below. Needs medications refilled. Admit date: 5/24/2023     Discharge date : 5/25/2023          Reason for Hospitalization:  Alcohol detoxification, alcohol use disorder     Hospital Course:   Pt admitted for alcohol intoxication and detoxification. She has known history of abuse and wanted to come to hospital in case of having withdraw symptoms. She has been enrolled in [place], and states has not drank in a while and then had vodka yesterday before coming to the hospital. She was started on IV fluids, librium  and CIWA protocol was ordered. She had not needed any additional lorazepam for CIWA per assessment scale and denies tremors and/or hallucinations. She is able to take PO diet without nausea/vomiting and has been cooperative and pleasant , no agitation or extreme mood swings. Pt is planning to continue with outpatient AA and [facility] Pt is stable for discharge home today with father. Pt is in agreement with plan.         Current Outpatient Medications on File Prior to Visit   Medication Sig Dispense Refill    doxycycline hyclate (VIBRA-TABS) 100 MG tablet Take 1 tablet by mouth daily

## 2023-06-09 NOTE — PROGRESS NOTES
Chief Complaint   Patient presents with    Follow-Up from [facility] 5/24/23  Detox from Alcohol   1. Have you been to the ER, urgent care clinic since your last visit? Hospitalized since your last visit? Yes 5/24/23-5/25/23  [facility] for alcohol detox    2. Have you seen or consulted any other health care providers outside of the [address] [place] [address] since your last visit? No     3. For patients aged 39-70: Has the patient had a colonoscopy / FIT/ Cologuard? NA - based on age/sex    If the patient is female:    4. For patients aged 41-77: Has the patient had a mammogram within the past 2 years? NA - based on age/sex      5. For patients aged 21-65: Has the patient had a pap smear? Yes- Care Gap Present. Rooming MA/LPN to request most recent result.   VPFW    PLEASE SEND LINK [phone]

## 2023-07-03 ENCOUNTER — TELEPHONE (OUTPATIENT)
Facility: CLINIC | Age: 28
End: 2023-07-03

## 2023-07-03 NOTE — TELEPHONE ENCOUNTER
----- Message from Robi Rodríguez sent at 7/3/2023 10:53 AM EDT -----  Subject: Referral Request    Reason for referral request? Patient called requesting a TB. It is for   employment. Please contact patient once order placed. Provider patient wants to be referred to(if known):     Provider Phone Number(if known):     Additional Information for Provider?   ---------------------------------------------------------------------------  --------------  Jose Frankfort Shiva    [phone]; OK to leave message on voicemail  ---------------------------------------------------------------------------  --------------

## 2023-07-05 ENCOUNTER — NURSE ONLY (OUTPATIENT)
Facility: CLINIC | Age: 28
End: 2023-07-05
Payer: OTHER GOVERNMENT

## 2023-07-05 DIAGNOSIS — Z11.1 SCREENING FOR TUBERCULOSIS: Primary | ICD-10-CM

## 2023-07-05 PROCEDURE — 86580 TB INTRADERMAL TEST: CPT | Performed by: STUDENT IN AN ORGANIZED HEALTH CARE EDUCATION/TRAINING PROGRAM

## 2023-07-07 ENCOUNTER — NURSE ONLY (OUTPATIENT)
Facility: CLINIC | Age: 28
End: 2023-07-07

## 2023-07-07 LAB
MM INDURATION, POC: 0 MM (ref 0–5)
PPD, POC: NEGATIVE

## 2023-07-10 DIAGNOSIS — G47.00 INSOMNIA, UNSPECIFIED TYPE: Primary | ICD-10-CM

## 2023-07-10 PROBLEM — F10.939 ALCOHOL WITHDRAWAL (HCC): Status: RESOLVED | Noted: 2022-12-14 | Resolved: 2023-07-10

## 2023-07-10 PROBLEM — Z78.9 WITHDRAWN FROM ALCOHOL DETOXIFICATION PROGRAM: Status: RESOLVED | Noted: 2023-05-24 | Resolved: 2023-07-10

## 2023-07-10 RX ORDER — TRAZODONE HYDROCHLORIDE 100 MG/1
200 TABLET ORAL NIGHTLY
Qty: 180 TABLET | Refills: 1 | Status: SHIPPED | OUTPATIENT
Start: 2023-07-10

## 2023-07-10 NOTE — TELEPHONE ENCOUNTER
Requesting a refill on her traZODone (DESYREL) 100 MG tablet sent into [facility] at [address] [place] [address] Street

## 2023-07-24 ENCOUNTER — OFFICE VISIT (OUTPATIENT)
Age: 28
End: 2023-07-24
Payer: OTHER GOVERNMENT

## 2023-07-24 VITALS
BODY MASS INDEX: 28.12 KG/M2 | TEMPERATURE: 97.3 F | WEIGHT: 175 LBS | SYSTOLIC BLOOD PRESSURE: 107 MMHG | OXYGEN SATURATION: 99 % | HEART RATE: 90 BPM | HEIGHT: 66 IN | DIASTOLIC BLOOD PRESSURE: 72 MMHG

## 2023-07-24 DIAGNOSIS — K70.31 ALCOHOLIC CIRRHOSIS OF LIVER WITH ASCITES (HCC): Primary | ICD-10-CM

## 2023-07-24 LAB
ALBUMIN SERPL-MCNC: 3.7 G/DL (ref 3.5–5)
ALBUMIN/GLOB SERPL: 1 (ref 1.1–2.2)
ALP SERPL-CCNC: 76 U/L (ref 45–117)
ALT SERPL-CCNC: 31 U/L (ref 12–78)
ANION GAP SERPL CALC-SCNC: 3 MMOL/L (ref 5–15)
AST SERPL-CCNC: 35 U/L (ref 15–37)
BASOPHILS # BLD: 0 K/UL (ref 0–0.1)
BASOPHILS NFR BLD: 0 % (ref 0–1)
BILIRUB DIRECT SERPL-MCNC: 0.1 MG/DL (ref 0–0.2)
BILIRUB SERPL-MCNC: 0.4 MG/DL (ref 0.2–1)
BUN SERPL-MCNC: 9 MG/DL (ref 6–20)
BUN/CREAT SERPL: 11 (ref 12–20)
CALCIUM SERPL-MCNC: 8.9 MG/DL (ref 8.5–10.1)
CHLORIDE SERPL-SCNC: 105 MMOL/L (ref 97–108)
CO2 SERPL-SCNC: 30 MMOL/L (ref 21–32)
CREAT SERPL-MCNC: 0.79 MG/DL (ref 0.55–1.02)
DIFFERENTIAL METHOD BLD: ABNORMAL
EOSINOPHIL # BLD: 0.1 K/UL (ref 0–0.4)
EOSINOPHIL NFR BLD: 2 % (ref 0–7)
ERYTHROCYTE [DISTWIDTH] IN BLOOD BY AUTOMATED COUNT: 15.1 % (ref 11.5–14.5)
GLOBULIN SER CALC-MCNC: 3.6 G/DL (ref 2–4)
GLUCOSE SERPL-MCNC: 104 MG/DL (ref 65–100)
HCT VFR BLD AUTO: 40.2 % (ref 35–47)
HGB BLD-MCNC: 12.2 G/DL (ref 11.5–16)
IMM GRANULOCYTES # BLD AUTO: 0 K/UL (ref 0–0.04)
IMM GRANULOCYTES NFR BLD AUTO: 0 % (ref 0–0.5)
INR PPP: 1.1 (ref 0.9–1.1)
LYMPHOCYTES # BLD: 1.2 K/UL (ref 0.8–3.5)
LYMPHOCYTES NFR BLD: 24 % (ref 12–49)
MCH RBC QN AUTO: 26.1 PG (ref 26–34)
MCHC RBC AUTO-ENTMCNC: 30.3 G/DL (ref 30–36.5)
MCV RBC AUTO: 86.1 FL (ref 80–99)
MONOCYTES # BLD: 0.5 K/UL (ref 0–1)
MONOCYTES NFR BLD: 10 % (ref 5–13)
NEUTS SEG # BLD: 3 K/UL (ref 1.8–8)
NEUTS SEG NFR BLD: 62 % (ref 32–75)
NRBC # BLD: 0 K/UL (ref 0–0.01)
NRBC BLD-RTO: 0 PER 100 WBC
PLATELET # BLD AUTO: 101 K/UL (ref 150–400)
PMV BLD AUTO: 11.1 FL (ref 8.9–12.9)
POTASSIUM SERPL-SCNC: 4.4 MMOL/L (ref 3.5–5.1)
PROT SERPL-MCNC: 7.3 G/DL (ref 6.4–8.2)
PROTHROMBIN TIME: 11.5 SEC (ref 9–11.1)
RBC # BLD AUTO: 4.67 M/UL (ref 3.8–5.2)
SODIUM SERPL-SCNC: 138 MMOL/L (ref 136–145)
WBC # BLD AUTO: 4.8 K/UL (ref 3.6–11)

## 2023-07-24 PROCEDURE — 99214 OFFICE O/P EST MOD 30 MIN: CPT | Performed by: NURSE PRACTITIONER

## 2023-07-24 NOTE — PROGRESS NOTES
MD Corine, FACP, [place], [place]      RIC Leahy, [facility]-BC   Orlin Saeed, [facility]-   Sara Almeida, KYRA Pinzon, FNCHARLETTE-MARTIN Chappell, [facility]-BC      [address]   at [facility]   [address] [facility], [address] [facility]   [facility], [address]   [phone]   FAX: [phone]  Liver [place] [facility], [address] [facility], [address]   [phone]   FAX: [phone]     Patient Care Team:  Guillermina Valentine MD as PCP - Omar Martinez MD as PCP - Empaneled Provider    Patient Active Problem List   Diagnosis    Neuropathy    Polycystic ovarian syndrome    Thrombocytopenia ([address])    Calculus of gallbladder without cholecystitis    Splenomegaly    Recurrent major depressive disorder, in partial remission ([address])    Sinus tachycardia    Alcoholic cirrhosis of liver with ascites ([address])    Depression    Alcohol use disorder    Teo Lewis is being seen at [facility] for management of cirrhosis secondary to alcoholic liver disease. The active problem list, all pertinent past medical history, medications, radiologic findings and laboratory findings related to the liver disorder were reviewed and discussed with the patient. The patient is a 24 y.o.  female who has consumed alcohol in excess over several years. She was hospitalized with alcoholic hepatitis and ascites in 1/2022. She had been abstinent from alcohol late 6/2022 through late 10/2022 and reports that she had multiple relapses through early 3/2023. She was seen in the EMD at that time for depression and suicide attempt on 3/4/23 with pills and alcohol. She was stopped by her boyfriend and brought to the hospital, admitted for 4 days.   She was then transitioned to

## 2023-10-30 ENCOUNTER — OFFICE VISIT (OUTPATIENT)
Age: 28
End: 2023-10-30
Payer: OTHER GOVERNMENT

## 2023-10-30 ENCOUNTER — HOSPITAL ENCOUNTER (OUTPATIENT)
Facility: HOSPITAL | Age: 28
Discharge: HOME | End: 2023-11-02
Payer: OTHER GOVERNMENT

## 2023-10-30 VITALS
HEART RATE: 99 BPM | WEIGHT: 187.2 LBS | OXYGEN SATURATION: 98 % | TEMPERATURE: 98.2 F | DIASTOLIC BLOOD PRESSURE: 67 MMHG | BODY MASS INDEX: 30.08 KG/M2 | HEIGHT: 66 IN | SYSTOLIC BLOOD PRESSURE: 111 MMHG

## 2023-10-30 DIAGNOSIS — K70.30 ALCOHOLIC CIRRHOSIS OF LIVER WITHOUT ASCITES (HCC): ICD-10-CM

## 2023-10-30 DIAGNOSIS — K70.31 ALCOHOLIC CIRRHOSIS OF LIVER WITH ASCITES (HCC): Primary | ICD-10-CM

## 2023-10-30 DIAGNOSIS — K70.31 ALCOHOLIC CIRRHOSIS OF LIVER WITH ASCITES (HCC): ICD-10-CM

## 2023-10-30 PROCEDURE — 76700 US EXAM ABDOM COMPLETE: CPT

## 2023-10-30 PROCEDURE — 99214 OFFICE O/P EST MOD 30 MIN: CPT | Performed by: PHYSICIAN ASSISTANT

## 2023-10-30 ASSESSMENT — PATIENT HEALTH QUESTIONNAIRE - PHQ9
SUM OF ALL RESPONSES TO PHQ9 QUESTIONS 1 & 2: 0
1. LITTLE INTEREST OR PLEASURE IN DOING THINGS: 0
SUM OF ALL RESPONSES TO PHQ QUESTIONS 1-9: 0
2. FEELING DOWN, DEPRESSED OR HOPELESS: 0
SUM OF ALL RESPONSES TO PHQ QUESTIONS 1-9: 0

## 2023-10-30 NOTE — PROGRESS NOTES
MD Corine, FACP, [place], [place]      RIC Brand, PCNP-BC   Axel De Leon, [facility]-AG   Devaughn Cormier, FNP-C  Lizbeth [facility], FNP-C   Teja Stanislav, AGPCNP-BC      [address]   at [facility]   [address] [facility], [address]   [facility], [address]   [phone]   FAX: [phone]   at [facility], [address] [facility], [address]   [phone]   FAX: [phone]     Patient Care Team:  Irving Jj MD as PCP - General    Patient Active Problem List   Diagnosis    Neuropathy    Polycystic ovarian syndrome    Thrombocytopenia ([address])    Calculus of gallbladder without cholecystitis    Splenomegaly    Recurrent major depressive disorder, in partial remission ([address])    Sinus tachycardia    Alcoholic cirrhosis of liver with ascites ([address])    Depression    Alcohol use disorder    Teo Velazco is being seen at [facility] for management of cirrhosis secondary to alcoholic liver disease. The active problem list, all pertinent past medical history, medications, radiologic findings and laboratory findings related to the liver disorder were reviewed and discussed with the patient. The patient is a 34 y.o.  female who has consumed alcohol in excess over several years. She was hospitalized with alcoholic hepatitis and ascites in 1/2022. She had been abstinent from alcohol late 6/2022 through late 10/2022 and reports that she had multiple relapses through early 3/2023. She was seen in the EMD at that time for depression and suicide attempt on 3/4/23 with pills and alcohol. She was stopped by her boyfriend and brought to the hospital, admitted for 4 days.   She was then transitioned to 30 day inpatient program and had been living

## 2023-10-31 LAB
ALBUMIN SERPL-MCNC: 4.2 G/DL (ref 4–5)
ALP SERPL-CCNC: 90 IU/L (ref 44–121)
ALT SERPL-CCNC: 16 IU/L (ref 0–32)
AST SERPL-CCNC: 27 IU/L (ref 0–40)
BASOPHILS # BLD AUTO: 0 X10E3/UL (ref 0–0.2)
BASOPHILS NFR BLD AUTO: 0 %
BILIRUB DIRECT SERPL-MCNC: 0.14 MG/DL (ref 0–0.4)
BILIRUB SERPL-MCNC: 0.4 MG/DL (ref 0–1.2)
BUN SERPL-MCNC: 14 MG/DL (ref 6–20)
BUN/CREAT SERPL: 20 (ref 9–23)
CALCIUM SERPL-MCNC: 9 MG/DL (ref 8.7–10.2)
CHLORIDE SERPL-SCNC: 103 MMOL/L (ref 96–106)
CO2 SERPL-SCNC: 23 MMOL/L (ref 20–29)
CREAT SERPL-MCNC: 0.69 MG/DL (ref 0.57–1)
EGFRCR SERPLBLD CKD-EPI 2021: 123 ML/MIN/1.73
EOSINOPHIL # BLD AUTO: 0.1 X10E3/UL (ref 0–0.4)
EOSINOPHIL NFR BLD AUTO: 2 %
ERYTHROCYTE [DISTWIDTH] IN BLOOD BY AUTOMATED COUNT: 14.8 % (ref 11.7–15.4)
GLUCOSE SERPL-MCNC: 127 MG/DL (ref 70–99)
HCT VFR BLD AUTO: 38 % (ref 34–46.6)
HGB BLD-MCNC: 12.5 G/DL (ref 11.1–15.9)
IMM GRANULOCYTES # BLD AUTO: 0 X10E3/UL (ref 0–0.1)
IMM GRANULOCYTES NFR BLD AUTO: 0 %
INR PPP: 1.1 (ref 0.9–1.2)
LYMPHOCYTES # BLD AUTO: 1.3 X10E3/UL (ref 0.7–3.1)
LYMPHOCYTES NFR BLD AUTO: 34 %
MCH RBC QN AUTO: 27 PG (ref 26.6–33)
MCHC RBC AUTO-ENTMCNC: 32.9 G/DL (ref 31.5–35.7)
MCV RBC AUTO: 82 FL (ref 79–97)
MONOCYTES # BLD AUTO: 0.3 X10E3/UL (ref 0.1–0.9)
MONOCYTES NFR BLD AUTO: 8 %
NEUTROPHILS # BLD AUTO: 2.1 X10E3/UL (ref 1.4–7)
NEUTROPHILS NFR BLD AUTO: 56 %
PLATELET # BLD AUTO: 115 X10E3/UL (ref 150–450)
POTASSIUM SERPL-SCNC: 4.1 MMOL/L (ref 3.5–5.2)
PROT SERPL-MCNC: 7 G/DL (ref 6–8.5)
PROTHROMBIN TIME: 12 SEC (ref 9.1–12)
RBC # BLD AUTO: 4.63 X10E6/UL (ref 3.77–5.28)
SODIUM SERPL-SCNC: 140 MMOL/L (ref 134–144)
WBC # BLD AUTO: 3.7 X10E3/UL (ref 3.4–10.8)

## 2023-10-31 NOTE — RESULT ENCOUNTER NOTE
Pt notified via [facility] message of stable findings. Will follow-up on AFP when available. Follow-up as scheduled.

## 2023-11-01 LAB
AFP L3 MFR SERPL: NORMAL % (ref 0–9.9)
AFP SERPL-MCNC: 2.5 NG/ML (ref 0–4.7)

## 2023-12-22 NOTE — TELEPHONE ENCOUNTER
Pt called in regards to needing a refill of Venlafaxine 150 and 75 mg to the [facility] at [address]

## 2023-12-26 RX ORDER — VENLAFAXINE HYDROCHLORIDE 150 MG/1
150 CAPSULE, EXTENDED RELEASE ORAL DAILY
Qty: 90 CAPSULE | Refills: 0 | Status: SHIPPED | OUTPATIENT
Start: 2023-12-26

## 2024-01-30 ENCOUNTER — OFFICE VISIT (OUTPATIENT)
Facility: CLINIC | Age: 29
End: 2024-01-30
Payer: COMMERCIAL

## 2024-01-30 VITALS
RESPIRATION RATE: 20 BRPM | BODY MASS INDEX: 31.12 KG/M2 | TEMPERATURE: 97.8 F | HEART RATE: 84 BPM | WEIGHT: 193.6 LBS | DIASTOLIC BLOOD PRESSURE: 80 MMHG | OXYGEN SATURATION: 98 % | SYSTOLIC BLOOD PRESSURE: 116 MMHG | HEIGHT: 66 IN

## 2024-01-30 DIAGNOSIS — F17.200 TOBACCO DEPENDENCE: ICD-10-CM

## 2024-01-30 DIAGNOSIS — Z00.00 WELL ADULT EXAM: Primary | ICD-10-CM

## 2024-01-30 DIAGNOSIS — F41.1 GENERALIZED ANXIETY DISORDER: ICD-10-CM

## 2024-01-30 DIAGNOSIS — F51.01 PRIMARY INSOMNIA: ICD-10-CM

## 2024-01-30 DIAGNOSIS — R00.0 SINUS TACHYCARDIA: ICD-10-CM

## 2024-01-30 DIAGNOSIS — F10.21 ALCOHOL DEPENDENCE, IN REMISSION (HCC): ICD-10-CM

## 2024-01-30 DIAGNOSIS — Z23 NEED FOR IMMUNIZATION AGAINST INFLUENZA: ICD-10-CM

## 2024-01-30 DIAGNOSIS — L70.0 ACNE VULGARIS: ICD-10-CM

## 2024-01-30 DIAGNOSIS — F33.41 RECURRENT MAJOR DEPRESSIVE DISORDER, IN PARTIAL REMISSION (HCC): ICD-10-CM

## 2024-01-30 PROCEDURE — 99214 OFFICE O/P EST MOD 30 MIN: CPT | Performed by: FAMILY MEDICINE

## 2024-01-30 PROCEDURE — 90674 CCIIV4 VAC NO PRSV 0.5 ML IM: CPT | Performed by: FAMILY MEDICINE

## 2024-01-30 PROCEDURE — 90471 IMMUNIZATION ADMIN: CPT | Performed by: FAMILY MEDICINE

## 2024-01-30 PROCEDURE — 99395 PREV VISIT EST AGE 18-39: CPT | Performed by: FAMILY MEDICINE

## 2024-01-30 RX ORDER — NICOTINE 21 MG/24HR
1 PATCH, TRANSDERMAL 24 HOURS TRANSDERMAL EVERY 24 HOURS
Qty: 42 PATCH | Refills: 0 | Status: SHIPPED | OUTPATIENT
Start: 2024-01-30 | End: 2024-01-31

## 2024-01-30 RX ORDER — DOXYCYCLINE HYCLATE 100 MG
100 TABLET ORAL DAILY
Qty: 1 TABLET | Refills: 0 | COMMUNITY
Start: 2024-01-30

## 2024-01-30 ASSESSMENT — PATIENT HEALTH QUESTIONNAIRE - PHQ9
2. FEELING DOWN, DEPRESSED OR HOPELESS: 0
1. LITTLE INTEREST OR PLEASURE IN DOING THINGS: 0
SUM OF ALL RESPONSES TO PHQ QUESTIONS 1-9: 0
10. IF YOU CHECKED OFF ANY PROBLEMS, HOW DIFFICULT HAVE THESE PROBLEMS MADE IT FOR YOU TO DO YOUR WORK, TAKE CARE OF THINGS AT HOME, OR GET ALONG WITH OTHER PEOPLE: 0
4. FEELING TIRED OR HAVING LITTLE ENERGY: 0
8. MOVING OR SPEAKING SO SLOWLY THAT OTHER PEOPLE COULD HAVE NOTICED. OR THE OPPOSITE, BEING SO FIGETY OR RESTLESS THAT YOU HAVE BEEN MOVING AROUND A LOT MORE THAN USUAL: 0
9. THOUGHTS THAT YOU WOULD BE BETTER OFF DEAD, OR OF HURTING YOURSELF: 0
SUM OF ALL RESPONSES TO PHQ QUESTIONS 1-9: 0
SUM OF ALL RESPONSES TO PHQ9 QUESTIONS 1 & 2: 0
5. POOR APPETITE OR OVEREATING: 0
7. TROUBLE CONCENTRATING ON THINGS, SUCH AS READING THE NEWSPAPER OR WATCHING TELEVISION: 0
3. TROUBLE FALLING OR STAYING ASLEEP: 0
6. FEELING BAD ABOUT YOURSELF - OR THAT YOU ARE A FAILURE OR HAVE LET YOURSELF OR YOUR FAMILY DOWN: 0

## 2024-01-30 NOTE — PROGRESS NOTES
[facility]      HPI: Pt is a 28 y.o. female who presents for well adult exam. Her mother has h/o skin cancer but no other cancers run in the family. Pt is working on stopping smoking and is 90 days sober from alcohol.     HM:  Varicella vaccine: Had as a child  Pneumonia vaccine: Politely declines  Hep B vaccine: Had as a child  HPV vaccine: Had as a child  TDaP: Declines for today  Pap: Follows with GYN and due for repeat  COVID booster: Pt advised she can get this at the pharmacy      Past Medical History:   Diagnosis Date    Acute kidney injury (HCC)     Alcohol withdrawal (HCC) 2022    Anemia     Anxiety     Chronic pain     Contact dermatitis and eczema due to cause     Depression     Liver disease     Polycystic ovarian syndrome     Withdrawn from alcohol detoxification program 2023       Family History   Problem Relation Age of Onset    Hypertension Neg Hx     Heart Disease Neg Hx     No Known Problems Paternal Grandfather     No Known Problems Paternal Grandmother     No Known Problems Brother     Alcohol Abuse Father     Depression Mother     Cancer Maternal Grandmother         skin    Other Maternal Grandmother         macular degeneration    Diabetes Maternal Grandfather     Atopy Sister        Social History     Tobacco Use    Smoking status: Former     Current packs/day: 0.00     Types: Cigarettes     Quit date: 2021     Years since quittin.1    Smokeless tobacco: Current   Vaping Use    Vaping Use: Every day    Substances: Nicotine   Substance Use Topics    Alcohol use: Not Currently    Drug use: Not Currently     Types: Marijuana (Weed)       ROS:  Per HPI    PE:  /80 (Site: Right Upper Arm, Position: Sitting, Cuff Size: Large Adult)   Pulse 84   Temp 97.8 °F (36.6 °C) (Temporal)   Resp 20   Ht 1.676 m (5' 6\")   Wt 87.8 kg (193 lb 9.6 oz)   SpO2 98%   BMI 31.25 kg/m²   Gen: Pt sitting in chair, in NAD  Head: Normocephalic, atraumatic  Eyes: Sclera

## 2024-01-30 NOTE — PROGRESS NOTES
[facility]      HPI: Pt is a 28 y.o. female who presents for follow-up.     Anxiety/Depression: Has been stable on Effexor. She is no longer taking Buspar and didn't think it helped.     Insomnia: Trazodone helps her sleep.     Sinus tachycardia: Stable on propranolol. HR 84 today.     Nicotine dependence: She would like to stop smoking and is interested in nicotine patches.     Alcohol dependence in remission: She is 90 days sober and active in .      Past Medical History:   Diagnosis Date    Acute kidney injury (HCC)     Alcohol withdrawal (HCC) 2022    Anemia     Anxiety     Chronic pain     Contact dermatitis and eczema due to cause     Depression     Liver disease     Polycystic ovarian syndrome     Withdrawn from alcohol detoxification program 2023       Family History   Problem Relation Age of Onset    Hypertension Neg Hx     Heart Disease Neg Hx     No Known Problems Paternal Grandfather     No Known Problems Paternal Grandmother     No Known Problems Brother     Alcohol Abuse Father     Depression Mother     Cancer Maternal Grandmother         skin    Other Maternal Grandmother         macular degeneration    Diabetes Maternal Grandfather     Atopy Sister        Social History     Tobacco Use    Smoking status: Former     Current packs/day: 0.00     Types: Cigarettes     Quit date: 2021     Years since quittin.1    Smokeless tobacco: Current   Vaping Use    Vaping Use: Every day    Substances: Nicotine   Substance Use Topics    Alcohol use: Not Currently    Drug use: Not Currently     Types: Marijuana (Weed)       ROS:  Per HPI    PE:  /80 (Site: Right Upper Arm, Position: Sitting, Cuff Size: Large Adult)   Pulse 84   Temp 97.8 °F (36.6 °C) (Temporal)   Resp 20   Ht 1.676 m (5' 6\")   Wt 87.8 kg (193 lb 9.6 oz)   SpO2 98%   BMI 31.25 kg/m²   Gen: Pt sitting in chair, in NAD  Head: Normocephalic, atraumatic  Eyes: Sclera anicteric, EOM grossly intact,

## 2024-01-30 NOTE — PROGRESS NOTES
\"Have you been to the ER, urgent care clinic since your last visit?  Hospitalized since your last visit?\"    NO    “Have you seen or consulted any other health care providers outside of [facility] since your last visit?”    NO            /80 (Site: Right Upper Arm, Position: Sitting, Cuff Size: Large Adult)   Temp 97.8 °F (36.6 °C) (Temporal)   Resp 20   Ht 1.676 m (5' 6\")   Wt 87.8 kg (193 lb 9.6 oz)   SpO2 98%   BMI 31.25 kg/m²    Chief Complaint   Patient presents with    Medication Refill          No questionnaires available.                        PHQ-9 Total Score: 0 (2024  3:37 PM)  Thoughts that you would be better off dead, or of hurting yourself in some way: 0 (2024  3:37 PM)       Identified Patient with 2 patient identifiers-Name and

## 2024-01-31 ENCOUNTER — OFFICE VISIT (OUTPATIENT)
Age: 29
End: 2024-01-31

## 2024-01-31 VITALS
DIASTOLIC BLOOD PRESSURE: 79 MMHG | BODY MASS INDEX: 29.89 KG/M2 | WEIGHT: 186 LBS | HEART RATE: 84 BPM | SYSTOLIC BLOOD PRESSURE: 123 MMHG | TEMPERATURE: 98.1 F | HEIGHT: 66 IN

## 2024-01-31 DIAGNOSIS — K70.30 ALCOHOLIC CIRRHOSIS OF LIVER WITHOUT ASCITES (HCC): Primary | ICD-10-CM

## 2024-01-31 LAB
INR PPP: 1.2 (ref 0.9–1.1)
PROTHROMBIN TIME: 12 SEC (ref 9–11.1)

## 2024-01-31 ASSESSMENT — PATIENT HEALTH QUESTIONNAIRE - PHQ9
1. LITTLE INTEREST OR PLEASURE IN DOING THINGS: 0
SUM OF ALL RESPONSES TO PHQ QUESTIONS 1-9: 0
SUM OF ALL RESPONSES TO PHQ QUESTIONS 1-9: 0
SUM OF ALL RESPONSES TO PHQ9 QUESTIONS 1 & 2: 0
SUM OF ALL RESPONSES TO PHQ QUESTIONS 1-9: 0
SUM OF ALL RESPONSES TO PHQ QUESTIONS 1-9: 0
2. FEELING DOWN, DEPRESSED OR HOPELESS: 0

## 2024-01-31 NOTE — PROGRESS NOTES
Identified pt with two pt identifiers(name and ). Reviewed record in preparation for visit and have obtained necessary documentation.    Chief Complaint   Patient presents with    Cirrhosis     3month follow up     /79 (Site: Right Upper Arm, Position: Sitting, Cuff Size: Medium Adult)   Pulse 84   Temp 98.1 °F (36.7 °C)   Ht 1.676 m (5' 6\")   Wt 84.4 kg (186 lb)   BMI 30.02 kg/m²       1. \"Have you been to the ER, urgent care clinic since your last visit?  Hospitalized since your last visit?\" Yes Urgent care visit for ear infection. Pt did not remember the date or facility     2. \"Have you seen or consulted any other health care providers outside of the [facility] since your last visit?\" Yes PCP      Patient is accompanied by self I have received verbal consent from Lia Berg to discuss any/all medical information while they are present in the room.

## 2024-01-31 NOTE — PROGRESS NOTES
[facility]      Jeff Pickard MD, FACP, FACG, FAASLD      RIC Floyd, [facility]-BC   Janiya Patterson, [facility]   Winsome Ellison, FNP-C  Jamar Iqbal, [facility]-C   Allegra Noguera, [facility]   at [facility]   [address], [address]   [place]  [address] [phone]   FAX: [phone]  [facility]   [address], [address]   [place]  [address] [phone]   FAX: [phone]     Patient Care Team:  Ryann Frye MD as PCP - General (Family Medicine)  Ryann Frye MD as PCP - Empaneled Provider  Alex Brannon MD (Dermatology)    Patient Active Problem List   Diagnosis    Neuropathy    Polycystic ovarian syndrome    Thrombocytopenia (HCC)    Calculus of gallbladder without cholecystitis    Splenomegaly    Recurrent major depressive disorder, in partial remission (HCC)    Sinus tachycardia    Alcoholic cirrhosis of liver with ascites (HCC)    Depression    Alcohol use disorder    Teo Berg is being seen at [facility] for management of cirrhosis secondary to alcoholic liver disease.  The active problem list, all pertinent past medical history, medications, radiologic findings and laboratory findings related to the liver disorder were reviewed and discussed with the patient.      The patient is a 28 y.o.  female who has consumed alcohol in excess over several years.  She was hospitalized with alcoholic hepatitis and ascites in 1/2022.    She had been abstinent from alcohol late 6/2022 through late 10/2022 and reports that she had multiple relapses through early 3/2023.  She was seen in the [facility] at that time for depression and suicide attempt on 3/4/23 with pills and alcohol.  She was stopped by her boyfriend and

## 2024-02-01 LAB
ALBUMIN SERPL-MCNC: 4.5 G/DL (ref 3.5–5)
ALBUMIN/GLOB SERPL: 1.3 (ref 1.1–2.2)
ALP SERPL-CCNC: 76 U/L (ref 45–117)
ALT SERPL-CCNC: 32 U/L (ref 12–78)
ANION GAP SERPL CALC-SCNC: 5 MMOL/L (ref 5–15)
AST SERPL-CCNC: 31 U/L (ref 15–37)
BILIRUB DIRECT SERPL-MCNC: 0.2 MG/DL (ref 0–0.2)
BILIRUB SERPL-MCNC: 0.7 MG/DL (ref 0.2–1)
BUN SERPL-MCNC: 13 MG/DL (ref 6–20)
BUN/CREAT SERPL: 20 (ref 12–20)
CALCIUM SERPL-MCNC: 9.4 MG/DL (ref 8.5–10.1)
CHLORIDE SERPL-SCNC: 105 MMOL/L (ref 97–108)
CO2 SERPL-SCNC: 28 MMOL/L (ref 21–32)
CREAT SERPL-MCNC: 0.66 MG/DL (ref 0.55–1.02)
ERYTHROCYTE [DISTWIDTH] IN BLOOD BY AUTOMATED COUNT: 14.3 % (ref 11.5–14.5)
GLOBULIN SER CALC-MCNC: 3.5 G/DL (ref 2–4)
GLUCOSE SERPL-MCNC: 83 MG/DL (ref 65–100)
HCT VFR BLD AUTO: 41.4 % (ref 35–47)
HGB BLD-MCNC: 14.2 G/DL (ref 11.5–16)
MCH RBC QN AUTO: 29.1 PG (ref 26–34)
MCHC RBC AUTO-ENTMCNC: 34.3 G/DL (ref 30–36.5)
MCV RBC AUTO: 84.8 FL (ref 80–99)
NRBC # BLD: 0 K/UL (ref 0–0.01)
NRBC BLD-RTO: 0 PER 100 WBC
PLATELET # BLD AUTO: 106 K/UL (ref 150–400)
PMV BLD AUTO: 10.8 FL (ref 8.9–12.9)
POTASSIUM SERPL-SCNC: 4.6 MMOL/L (ref 3.5–5.1)
PROT SERPL-MCNC: 8 G/DL (ref 6.4–8.2)
RBC # BLD AUTO: 4.88 M/UL (ref 3.8–5.2)
SODIUM SERPL-SCNC: 138 MMOL/L (ref 136–145)
WBC # BLD AUTO: 5.3 K/UL (ref 3.6–11)

## 2024-02-01 NOTE — RESULT ENCOUNTER NOTE
Pt notified via  message of stable findings, low platelet due to PHTN. Will follow-up on AFP when available.  Follow-up as scheduled 3 months for close monitoring and repeat US at that time.

## 2024-02-05 LAB
AFP L3 MFR SERPL: NORMAL % (ref 0–9.9)
AFP SERPL-MCNC: 1.5 NG/ML (ref 0–4.7)

## 2024-02-09 DIAGNOSIS — G47.00 INSOMNIA, UNSPECIFIED TYPE: ICD-10-CM

## 2024-02-09 NOTE — TELEPHONE ENCOUNTER
Patient called needing a refill for Trazodone 100 mg.     Last appt: 1.30.24 with MWE  Next appt: 7.30.24 with RANDIE    [facility] - [place], [place] - [address] [place] [address] [phone]

## 2024-02-10 RX ORDER — TRAZODONE HYDROCHLORIDE 100 MG/1
100 TABLET ORAL NIGHTLY
Qty: 90 TABLET | Refills: 1 | Status: SHIPPED | OUTPATIENT
Start: 2024-02-10

## 2024-03-05 ENCOUNTER — TELEPHONE (OUTPATIENT)
Facility: CLINIC | Age: 29
End: 2024-03-05

## 2024-03-05 DIAGNOSIS — K70.31 ALCOHOLIC CIRRHOSIS OF LIVER WITH ASCITES (HCC): Primary | ICD-10-CM

## 2024-03-05 NOTE — TELEPHONE ENCOUNTER
Patient called wondering if she can get a referral for [facility]. She stated she is already a patient there. Any questions please call patient at [phone].

## 2024-03-06 ENCOUNTER — PATIENT MESSAGE (OUTPATIENT)
Facility: CLINIC | Age: 29
End: 2024-03-06

## 2024-03-06 NOTE — TELEPHONE ENCOUNTER
From: Lia Berg  Sent: 3/6/2024 1:34 PM EST  To: [facility] Clinical Staff  Subject: Hepatology referral    Thank you so much! I’m wondering if there is a way that it can be submitted retroactively for the beginning of this year?

## 2024-03-07 NOTE — PROGRESS NOTES
Hospitalist Progress Note    Subjective:   Daily Progress Note: 1/23/2022 1:17 PM    Hospital Course:  42-year-old female with past medical history including hepatitis C, alcohol use disorder, cirrhosis of the liver. Patient presented to the emergency department complaining of generalized weakness, fatigue and shortness of breath and palpitations since this morning. Endorses alcohol consumptionnlast drink was 2-3 day ago. Patient is tremulous and appears anxious. Denies headache or hallucinations. Previously admitted in [facility] for dehydration and cirrhosis. In the ER patient is tachycardic, heart rate up to 125 my laboratory derangements including pancytopenia, lactic acidosis, metabolic acidosis with serum bicarb of 15, elevated liver enzymes with ALT AST ratio>2, coagulopathy. Serum ammonia is 55. She is alert and oriented. Patient was admitted  for further management. Subjective:  Patient denies any new complaints. Alcohol cessation counseling done, motivated to quit alcohol.     Current Facility-Administered Medications   Medication Dose Route Frequency    0.9% sodium chloride infusion  100 mL/hr IntraVENous CONTINUOUS    LORazepam (ATIVAN) injection 2 mg  2 mg IntraVENous Q1H PRN    lactulose (CHRONULAC) 10 gram/15 mL solution 15 mL  15 mL Oral BID    magnesium sulfate 2 g/50 ml IVPB (premix or compounded)  2 g IntraVENous Q1H    potassium chloride SR (KLOR-CON 10) tablet 40 mEq  40 mEq Oral DAILY    [Held by provider] furosemide (LASIX) tablet 40 mg  40 mg Oral DAILY    [Held by provider] spironolactone (ALDACTONE) tablet 50 mg  50 mg Oral DAILY    folic acid (FOLVITE) tablet 1 mg  1 mg Oral DAILY    gabapentin (NEURONTIN) capsule 100 mg  100 mg Oral BID    venlafaxine-SR (EFFEXOR-XR) capsule 150 mg  150 mg Oral DAILY    sodium chloride (NS) flush 5-40 mL  5-40 mL IntraVENous Q8H    sodium chloride (NS) flush 5-40 mL  5-40 mL IntraVENous PRN    acetaminophen (TYLENOL) tablet 650 Spoke with mom and conveyed results. Mom verbalized understanding of same.     mg  650 mg Oral Q6H PRN    Or    acetaminophen (TYLENOL) suppository 650 mg  650 mg Rectal Q6H PRN    polyethylene glycol (MIRALAX) packet 17 g  17 g Oral DAILY PRN    ondansetron (ZOFRAN ODT) tablet 4 mg  4 mg Oral Q8H PRN    Or    ondansetron (ZOFRAN) injection 4 mg  4 mg IntraVENous Q6H PRN    multivitamin, tx-iron-ca-min (THERA-M w/ IRON) tablet 1 Tablet  1 Tablet Oral DAILY    thiamine mononitrate (B-1) tablet 100 mg  100 mg Oral DAILY     Current Outpatient Medications   Medication Sig    diclofenac (VOLTAREN) 1 % gel APPLY 1 GRAM TWICE DAILY AS DIRECTED (Patient not taking: Reported on 12/10/2021)    Vitamin D2 1,250 mcg (50,000 unit) capsule take 1 capsule by mouth every week ON FRIDAY AT 9AM (Patient not taking: Reported on 12/10/2021)    lidocaine (LIDODERM) 5 % apply 1 patch TO THE AFFECTED AREA DAILY. LEAVE ON FOR 12 HOURS AND THEN OFF FOR 12 HOURS. (Patient not taking: Reported on 12/10/2021)    furosemide (LASIX) 40 mg tablet Take 1 Tablet by mouth daily.  spironolactone (ALDACTONE) 25 mg tablet take 2 tablets by mouth once daily as directed    folic acid (FOLVITE) 1 mg tablet Take 1 Tablet by mouth daily.  gabapentin (NEURONTIN) 100 mg capsule Take 1 Capsule by mouth two (2) times a day. Max Daily Amount: 200 mg.    venlafaxine-SR (EFFEXOR-XR) 150 mg capsule Take 1 Capsule by mouth daily.  potassium bicarb-citric acid (Effer-K) 20 mEq tablet Take 1 Tablet by mouth daily.         Review of Systems  Constitutional: No fevers, No chills, No sweats, No fatigue, No Weakness  Eyes: No redness  Ears, nose, mouth, throat, and face: No nasal congestion, No sore throat, No voice change  Respiratory: No Shortness of Breath, No cough, No wheezing  Cardiovascular: No chest pain, No palpitations, No extremity edema  Gastrointestinal: No nausea, No vomiting, No diarrhea, No abdominal pain  Genitourinary: No frequency, No dysuria, No hematuria  Integument/breast: No skin lesion(s)   Neurological: No Confusion, No headaches, No dizziness      Objective:     Visit Vitals  /62 (BP 1 Location: Right upper arm, BP Patient Position: At rest)   Pulse (!) 111   Temp 98.1 °F (36.7 °C)   Resp 16   Ht 5' 5\" (1.651 m)   Wt 63.5 kg (140 lb)   SpO2 100%   BMI 23.30 kg/m²      O2 Device: None (Room air)    Temp (24hrs), Av.8 °F (36.6 °C), Min:97.5 °F (36.4 °C), Max:98.1 °F (36.7 °C)      No intake/output data recorded.  1901 -  0700  In: 1400 [I.V.:1400]  Out: -     PHYSICAL EXAM:  Constitutional: No acute distress  Skin: Extremities and face reveal no rashes. HEENT: Sclerae anicteric. Extra-occular muscles are intact. No oral ulcers. The neck is supple and no masses. Cardiovascular: Regular rate and rhythm. Respiratory:  Clear breath sounds bilaterally with no wheezes, rales, or rhonchi. GI: Abdomen nondistended, soft, and nontender. Normal active bowel sounds. Musculoskeletal: No pitting edema of the lower legs. Able to move all ext  Neurological:  Patient is alert and oriented. Cranial nerves II-XII grossly intact  Psychiatric: Mood appears appropriate       Data Review    Recent Results (from the past 24 hour(s))   CBC WITH AUTOMATED DIFF    Collection Time: 22  4:06 PM   Result Value Ref Range    WBC 2.4 (L) 3.6 - 11.0 K/uL    RBC 3.31 (L) 3.80 - 5.20 M/uL    HGB 10.7 (L) 11.5 - 16.0 g/dL    HCT 33.3 (L) 35.0 - 47.0 %    .6 (H) 80.0 - 99.0 FL    MCH 32.3 26.0 - 34.0 PG    MCHC 32.1 30.0 - 36.5 g/dL    RDW 18.2 (H) 11.5 - 14.5 %    PLATELET 62 (L) 283 - 400 K/uL    MPV 9.9 8.9 - 12.9 FL    NRBC 0.0 0.0  WBC    ABSOLUTE NRBC 0.00 0.00 - 0.01 K/uL    NEUTROPHILS 59 32 - 75 %    LYMPHOCYTES 32 12 - 49 %    MONOCYTES 7 5 - 13 %    EOSINOPHILS 1 0 - 7 %    BASOPHILS 0 0 - 1 %    IMMATURE GRANULOCYTES 1 (H) 0 - 0.5 %    ABS. NEUTROPHILS 1.4 (L) 1.8 - 8.0 K/UL    ABS. LYMPHOCYTES 0.8 0.8 - 3.5 K/UL    ABS. MONOCYTES 0.2 0.0 - 1.0 K/UL    ABS.  EOSINOPHILS 0.0 0.0 - 0.4 K/UL ABS. BASOPHILS 0.0 0.0 - 0.1 K/UL    ABS. IMM. GRANS. 0.0 0.00 - 0.04 K/UL    DF AUTOMATED      RBC COMMENTS Normocytic, Normochromic      RBC COMMENTS NO BLASTS OR ABNORMAL LYMPHOCYTES NOTED    METABOLIC PANEL, COMPREHENSIVE    Collection Time: 01/22/22  4:06 PM   Result Value Ref Range    Sodium 138 136 - 145 mmol/L    Potassium 2.8 (L) 3.5 - 5.1 mmol/L    Chloride 105 97 - 108 mmol/L    CO2 15 (LL) 21 - 32 mmol/L    Anion gap 18 (H) 5 - 15 mmol/L    Glucose 104 (H) 65 - 100 mg/dL    BUN 7 6 - 20 mg/dL    Creatinine 0.83 0.55 - 1.02 mg/dL    BUN/Creatinine ratio 8 (L) 12 - 20      GFR est AA >60 >60 ml/min/1.73m2    GFR est non-AA >60 >60 ml/min/1.73m2    Calcium 8.3 (L) 8.5 - 10.1 mg/dL    Bilirubin, total 3.4 (H) 0.2 - 1.0 mg/dL    AST (SGOT) 208 (H) 15 - 37 U/L    ALT (SGPT) 44 12 - 78 U/L    Alk.  phosphatase 162 (H) 45 - 117 U/L    Protein, total 7.3 6.4 - 8.2 g/dL    Albumin 2.4 (L) 3.5 - 5.0 g/dL    Globulin 4.9 (H) 2.0 - 4.0 g/dL    A-G Ratio 0.5 (L) 1.1 - 2.2     LIPASE    Collection Time: 01/22/22  4:06 PM   Result Value Ref Range    Lipase 65 (L) 73 - 393 U/L   TROPONIN-HIGH SENSITIVITY    Collection Time: 01/22/22  4:06 PM   Result Value Ref Range    Troponin-High Sensitivity 5 0 - 51 ng/L   NT-PRO BNP    Collection Time: 01/22/22  4:06 PM   Result Value Ref Range    NT pro- (H) <125 pg/mL   TSH 3RD GENERATION    Collection Time: 01/22/22  4:06 PM   Result Value Ref Range    TSH 2.40 0.36 - 3.74 uIU/mL   COVID-19 RAPID TEST    Collection Time: 01/22/22  4:23 PM   Result Value Ref Range    Specimen source Please find results under separate order      COVID-19 rapid test Not Detected Not Detected     D DIMER    Collection Time: 01/22/22  4:23 PM   Result Value Ref Range    D DIMER 2.61 (H) <0.50 ug/ml(FEU)   PROTHROMBIN TIME + INR    Collection Time: 01/22/22  4:23 PM   Result Value Ref Range    Prothrombin time 22.4 (H) 11.9 - 14.7 sec    INR 2.0 (H) 0.9 - 1.1     MAGNESIUM    Collection Time: 01/22/22  5:58 PM   Result Value Ref Range    Magnesium 1.6 1.6 - 2.4 mg/dL   LACTIC ACID    Collection Time: 01/22/22  5:58 PM   Result Value Ref Range    Lactic acid 6.6 (HH) 0.4 - 2.0 mmol/L   HCG QL SERUM    Collection Time: 01/22/22  5:58 PM   Result Value Ref Range    HCG, Ql. Negative Negative     LACTIC ACID    Collection Time: 01/22/22  7:05 PM   Result Value Ref Range    Lactic acid 5.5 (HH) 0.4 - 2.0 mmol/L   AMMONIA    Collection Time: 01/22/22  9:47 PM   Result Value Ref Range    Ammonia 55 (H) <02 umol/L   METABOLIC PANEL, BASIC    Collection Time: 01/23/22  3:35 AM   Result Value Ref Range    Sodium 138 136 - 145 mmol/L    Potassium 3.2 (L) 3.5 - 5.1 mmol/L    Chloride 108 97 - 108 mmol/L    CO2 25 21 - 32 mmol/L    Anion gap 5 5 - 15 mmol/L    Glucose 93 65 - 100 mg/dL    BUN 6 6 - 20 mg/dL    Creatinine 0.55 0.55 - 1.02 mg/dL    BUN/Creatinine ratio 11 (L) 12 - 20      GFR est AA >60 >60 ml/min/1.73m2    GFR est non-AA >60 >60 ml/min/1.73m2    Calcium 7.9 (L) 8.5 - 10.1 mg/dL   CBC W/O DIFF    Collection Time: 01/23/22  3:35 AM   Result Value Ref Range    WBC 2.6 (L) 3.6 - 11.0 K/uL    RBC 2.79 (L) 3.80 - 5.20 M/uL    HGB 9.0 (L) 11.5 - 16.0 g/dL    HCT 27.7 (L) 35.0 - 47.0 %    MCV 99.3 (H) 80.0 - 99.0 FL    MCH 32.3 26.0 - 34.0 PG    MCHC 32.5 30.0 - 36.5 g/dL    RDW 17.8 (H) 11.5 - 14.5 %    PLATELET 71 (L) 803 - 400 K/uL    MPV 10.6 8.9 - 12.9 FL    NRBC 0.0 0.0  WBC    ABSOLUTE NRBC 0.00 0.00 - 0.01 K/uL   HEPATITIS PANEL, ACUTE    Collection Time: 01/23/22  3:35 AM   Result Value Ref Range    Hepatitis A, IgM NONREACTIVE NONREACTIVE      __ PENDING     Hepatitis B surface Ag <0.10 Index    Hep B surface Ag Interp. Negative Negative    __ PENDING     Hepatitis B core, IgM NONREACTIVE NONREACTIVE    __ PENDING     Hepatitis C virus Ab 0.04 Index    Hep C virus Ab Interp.  NONREACTIVE NONREACTIVE   LACTIC ACID    Collection Time: 01/23/22  3:35 AM   Result Value Ref Range    Lactic acid 1.2 0.4 - 2.0 mmol/L   MAGNESIUM    Collection Time: 01/23/22  3:35 AM   Result Value Ref Range    Magnesium 1.5 (L) 1.6 - 2.4 mg/dL         Assessment / Plan:  EtOH withdrawal:  Still tachycardic even after fluid resuscitation and normalization of lactic acid. [facility] protocol  -Thiamine, folic acid, multivitamin  -IV hydration       Steatosis of liver  -USG abdomen shows steatosis of liver.  -Hallmarks of CLD present including pancytopenia, coagulopathy, transaminitis, hyperbilirubinemia  -Lasix and spironolactone are on hold due to dehydration  -Lactulose  -GI follow up in the out patient     Elevated anion gap metabolic acidosis  Resolved    Acute hypokalemia   Will replete potassium   Goal potassium>4    Acute hypomagnesemia:  Will replete magnesium  Goal Magnesium> 2         18.5 - 24.9 Normal weight / Body mass index is 23.3 kg/m². Code status: Full  Prophylaxis: SCD's  Recommended Disposition: Home w/Family likely tomorrow pending clinical improvement. Time spent 35 minutes involving direct patient care as well as reviewing patient's labs and coordination of care with nursing staff     Care Plan discussed with: Patient/Family/RN/Case Management        Total time spent with patient: 35 minutes.

## 2024-03-11 ENCOUNTER — TELEPHONE (OUTPATIENT)
Facility: CLINIC | Age: 29
End: 2024-03-11

## 2024-03-11 NOTE — TELEPHONE ENCOUNTER
Patient called stating her insurance recently changed and now needs a referral to [facility]. Any questions or information pleaser call patient at [phone]. Phone number for [facility] is [phone].

## 2024-03-12 NOTE — TELEPHONE ENCOUNTER
I'm happy to put in the referral, I just need to know what it's for. Will send patient MyChart message to clarify.

## 2024-03-19 RX ORDER — VENLAFAXINE HYDROCHLORIDE 150 MG/1
150 CAPSULE, EXTENDED RELEASE ORAL DAILY
Qty: 90 CAPSULE | Refills: 1 | Status: SHIPPED | OUTPATIENT
Start: 2024-03-19

## 2024-04-29 ENCOUNTER — PATIENT MESSAGE (OUTPATIENT)
Facility: CLINIC | Age: 29
End: 2024-04-29

## 2024-04-29 DIAGNOSIS — K70.31 ALCOHOLIC CIRRHOSIS OF LIVER WITH ASCITES (HCC): Primary | ICD-10-CM

## 2024-04-29 NOTE — TELEPHONE ENCOUNTER
From: Lia Berg  To: Dr. Ryann Frye  Sent: 4/29/2024 8:08 AM EDT  Subject: Referral     Hi Dr. Frye, I am seeing my liver doctor on May 6th at 3:00 PM, and I need a referral I believe. It’s at the [facility]. Please let me know if you need any more information. Thank you!

## 2024-05-06 ENCOUNTER — OFFICE VISIT (OUTPATIENT)
Age: 29
End: 2024-05-06
Payer: COMMERCIAL

## 2024-05-06 ENCOUNTER — HOSPITAL ENCOUNTER (OUTPATIENT)
Facility: HOSPITAL | Age: 29
Discharge: HOME | End: 2024-05-09
Payer: COMMERCIAL

## 2024-05-06 VITALS
SYSTOLIC BLOOD PRESSURE: 134 MMHG | OXYGEN SATURATION: 99 % | BODY MASS INDEX: 31.02 KG/M2 | RESPIRATION RATE: 16 BRPM | HEIGHT: 66 IN | DIASTOLIC BLOOD PRESSURE: 89 MMHG | WEIGHT: 193 LBS | HEART RATE: 94 BPM | TEMPERATURE: 97.1 F

## 2024-05-06 DIAGNOSIS — K70.30 ALCOHOLIC CIRRHOSIS OF LIVER WITHOUT ASCITES (HCC): ICD-10-CM

## 2024-05-06 DIAGNOSIS — K70.30 ALCOHOLIC CIRRHOSIS OF LIVER WITHOUT ASCITES (HCC): Primary | ICD-10-CM

## 2024-05-06 DIAGNOSIS — R53.83 FATIGUE, UNSPECIFIED TYPE: ICD-10-CM

## 2024-05-06 PROCEDURE — 99214 OFFICE O/P EST MOD 30 MIN: CPT | Performed by: PHYSICIAN ASSISTANT

## 2024-05-06 PROCEDURE — 76700 US EXAM ABDOM COMPLETE: CPT

## 2024-05-06 NOTE — PROGRESS NOTES
Rm 6    Chief Complaint   Patient presents with    Follow-up     1. Have you been to the ER, urgent care clinic since your last visit?  Hospitalized since your last visit?No    2. Have you seen or consulted any other health care providers outside of the [facility] since your last visit?  Include any pap smears or colon screening. No      /89   Pulse 94   Temp 97.1 °F (36.2 °C) (Temporal)   Resp 16   Ht 1.676 m (5' 6\")   Wt 87.5 kg (193 lb)   SpO2 99%   BMI 31.15 kg/m²     
need to start lactulose or Xifaxan.     Thrombocytopenia   This is secondary to cirrhosis.  There is no evidence of overt bleeding.    No treatment is required.  The platelet count is adequate for the patient to undergo procedures without the need for platelet transfusion or platelet growth factors.    Screening for Hepatocellular Carcinoma  HCC screening was performed 10/2023, no new concerns.  This study was repeated today prior to office visit, will follow-up on results as available. We will plan upon repeat ordering ultrasound every 6 to 9 months and ongoing AFP evaluation.    Acne  Patient is known to have steatosis of liver associated with alcohol use, liver functions have stabilized.  I have reviewed with her that doxycycline can be associated with microvesicular steatosis with prolonged use.  She is not currently on medications at this time with dermatology.  She is trying to establish with a new office and additional treatment with future. She may want to restart on doxycycline 100 mg daily.  We will continue to monitor labs every 3 months and if there is pattern of elevation in liver enzymes, she may need to consider alternative. Would recommend limiting other oral medications and focus on topical preparations if additional treatment is indicated.     Treatment of other medical problems in patients with chronic liver disease  There are no contraindications for the patient to take most medications that are necessary for treatment of other medical issues.    The patient has cirrhosis and should avoid taking NSAIDs which are associated with a higher rate of developing TEQUILA.        The patient consumes alcohol on a daily basis or has recently stopped consuming alcohol.  Regular alcohol use increases the risk of toxicity from acetaminophen.  This analgesic should be avoided until the patient has been abstinent from alcohol for 6 months.      Osteoporosis  The risk of osteoporosis is increased in patients with

## 2024-05-08 LAB
25(OH)D3+25(OH)D2 SERPL-MCNC: 25.1 NG/ML (ref 30–100)
AFP L3 MFR SERPL: NORMAL % (ref 0–9.9)
AFP SERPL-MCNC: 1.7 NG/ML (ref 0–4.7)
ERYTHROCYTE [DISTWIDTH] IN BLOOD BY AUTOMATED COUNT: 13.2 % (ref 11.7–15.4)
HCT VFR BLD AUTO: 41 % (ref 34–46.6)
HGB BLD-MCNC: 13.7 G/DL (ref 11.1–15.9)
INR PPP: 1.1 (ref 0.9–1.2)
MCH RBC QN AUTO: 29.6 PG (ref 26.6–33)
MCHC RBC AUTO-ENTMCNC: 33.4 G/DL (ref 31.5–35.7)
MCV RBC AUTO: 89 FL (ref 79–97)
PLATELET # BLD AUTO: 100 X10E3/UL (ref 150–450)
PROTHROMBIN TIME: 11.9 SEC (ref 9.1–12)
RBC # BLD AUTO: 4.63 X10E6/UL (ref 3.77–5.28)
WBC # BLD AUTO: 6.5 X10E3/UL (ref 3.4–10.8)

## 2024-05-09 LAB
ALBUMIN SERPL-MCNC: 4.4 G/DL (ref 4–5)
ALP SERPL-CCNC: 75 IU/L (ref 44–121)
ALT SERPL-CCNC: 23 IU/L (ref 0–32)
AST SERPL-CCNC: 27 IU/L (ref 0–40)
BILIRUB DIRECT SERPL-MCNC: 0.17 MG/DL (ref 0–0.4)
BILIRUB SERPL-MCNC: 0.4 MG/DL (ref 0–1.2)
BUN SERPL-MCNC: 12 MG/DL (ref 6–20)
BUN/CREAT SERPL: 16 (ref 9–23)
CALCIUM SERPL-MCNC: 9.2 MG/DL (ref 8.7–10.2)
CHLORIDE SERPL-SCNC: 102 MMOL/L (ref 96–106)
CO2 SERPL-SCNC: 21 MMOL/L (ref 20–29)
CREAT SERPL-MCNC: 0.74 MG/DL (ref 0.57–1)
EGFRCR SERPLBLD CKD-EPI 2021: 114 ML/MIN/1.73
GLUCOSE SERPL-MCNC: 110 MG/DL (ref 70–99)
POTASSIUM SERPL-SCNC: 4.5 MMOL/L (ref 3.5–5.2)
PROT SERPL-MCNC: 7.2 G/DL (ref 6–8.5)
SODIUM SERPL-SCNC: 139 MMOL/L (ref 134–144)
TSH SERPL DL<=0.005 MIU/L-ACNC: 0.77 UIU/ML (ref 0.45–4.5)

## 2024-05-10 NOTE — RESULT ENCOUNTER NOTE
Pt notified via  message of stable findings. Advised OTC supplement of Vit D. Follow-up as scheduled.

## 2024-06-14 ENCOUNTER — TELEPHONE (OUTPATIENT)
Facility: CLINIC | Age: 29
End: 2024-06-14

## 2024-06-14 NOTE — TELEPHONE ENCOUNTER
----- Message from Janiya Quinn sent at 6/14/2024  9:17 AM EDT -----  Subject: Appointment Request    Reason for Call: Established Patient Appointment needed: Routine Existing   Condition Follow Up    QUESTIONS    Reason for appointment request? No appointments available during search     Additional Information for Provider? Patient is wanting an appt to discuss   coming off the venlafaxine (EFFEXOR XR) 150 MG extended release capsule.   She wants to discuss doxycyclene for acne as well. She uses [facility] [address]. No appts were available. Please advise or call to schedule. Thank   you   ---------------------------------------------------------------------------  --------------  CALL BACK INFO  [phone]; OK to leave message on voicemail  ---------------------------------------------------------------------------  --------------  SCRIPT ANSWERS

## 2024-07-05 ENCOUNTER — TELEMEDICINE (OUTPATIENT)
Facility: CLINIC | Age: 29
End: 2024-07-05
Payer: COMMERCIAL

## 2024-07-05 DIAGNOSIS — L70.0 ACNE VULGARIS: ICD-10-CM

## 2024-07-05 DIAGNOSIS — F33.42 RECURRENT MAJOR DEPRESSIVE DISORDER, IN FULL REMISSION (HCC): Primary | ICD-10-CM

## 2024-07-05 PROCEDURE — 99214 OFFICE O/P EST MOD 30 MIN: CPT | Performed by: FAMILY MEDICINE

## 2024-07-05 RX ORDER — VENLAFAXINE HYDROCHLORIDE 75 MG/1
75 CAPSULE, EXTENDED RELEASE ORAL DAILY
Qty: 30 CAPSULE | Refills: 3 | Status: SHIPPED | OUTPATIENT
Start: 2024-07-05

## 2024-07-05 RX ORDER — DOXYCYCLINE HYCLATE 100 MG
100 TABLET ORAL DAILY
Qty: 90 TABLET | Refills: 1 | Status: SHIPPED | OUTPATIENT
Start: 2024-07-05

## 2024-07-05 SDOH — ECONOMIC STABILITY: FOOD INSECURITY: WITHIN THE PAST 12 MONTHS, THE FOOD YOU BOUGHT JUST DIDN'T LAST AND YOU DIDN'T HAVE MONEY TO GET MORE.: NEVER TRUE

## 2024-07-05 SDOH — ECONOMIC STABILITY: FOOD INSECURITY: WITHIN THE PAST 12 MONTHS, YOU WORRIED THAT YOUR FOOD WOULD RUN OUT BEFORE YOU GOT MONEY TO BUY MORE.: NEVER TRUE

## 2024-07-05 SDOH — ECONOMIC STABILITY: INCOME INSECURITY: HOW HARD IS IT FOR YOU TO PAY FOR THE VERY BASICS LIKE FOOD, HOUSING, MEDICAL CARE, AND HEATING?: NOT HARD AT ALL

## 2024-07-05 NOTE — PROGRESS NOTES
Lia Berg is a 28 y.o. female who was seen by synchronous (real-time) audio-video technology.     Consent:  Patient and/or their healthcare decision maker is aware that this patient-initiated Telehealth encounter is a billable service, with coverage as determined by their insurance carrier. They are aware that they may receive a bill and have provided verbal consent to proceed: Yes    I was at home while conducting this encounter.    Platform: AppMesh    HPI: Pt is a 28 y.o. female who presents for follow-up.    Depression: This has been doing well for her and she is interested in coming down on her dose of Effexor. She is currently taking 150mg daily.    Acne: She had previously been on doxycycline for this but stopped it. Since then her acne has gotten much worse and she would like to get back on it.       Past Medical History:   Diagnosis Date    Acute kidney injury (HCC)     Alcohol withdrawal (HCC) 12/14/2022    Anemia     Anxiety     Chronic pain     Contact dermatitis and eczema due to cause     Depression     Liver disease     Polycystic ovarian syndrome     Withdrawn from alcohol detoxification program 05/24/2023       Family History   Problem Relation Age of Onset    Hypertension Neg Hx     Heart Disease Neg Hx     No Known Problems Paternal Grandfather     No Known Problems Paternal Grandmother     No Known Problems Brother     Alcohol Abuse Father     Depression Mother     Cancer Maternal Grandmother         skin    Other Maternal Grandmother         macular degeneration    Diabetes Maternal Grandfather     Atopy Sister        Social History     Tobacco Use    Smoking status: Every Day     Current packs/day: 0.50     Average packs/day: 0.5 packs/day for 2.5 years (1.3 ttl pk-yrs)     Types: Cigarettes     Start date: 2022     Last attempt to quit: 12/21/2021    Smokeless tobacco: Current   Vaping Use    Vaping Use: Every day    Substances: Nicotine   Substance Use Topics    Alcohol use: Not

## 2024-07-05 NOTE — PROGRESS NOTES
Chief Complaint   Patient presents with    Discuss Medications     No data recorded  \"Have you been to the ER, urgent care clinic since your last visit?  Hospitalized since your last visit?\"    YES - When: approximately 1 days ago.  Where and Why: patient would not disclose.    “Have you seen or consulted any other health care providers outside of [facility] since your last visit?”    NO     “Have you had a pap smear?”    [facility] if not in the chart    No cervical cancer screening on file           [phone] - SEND LINK

## 2024-07-08 ENCOUNTER — TELEPHONE (OUTPATIENT)
Facility: CLINIC | Age: 29
End: 2024-07-08

## 2024-07-08 NOTE — TELEPHONE ENCOUNTER
FYI: Patient called stating that she needed an insurance referral put in for her upcoming appointment with Dr. Rojas Richard with [facility] @ [facility] on 7/10/24 for follow up from ER on Dx: Pelvic Inflammatory Disease and Pelvic Pain. Referral was Approved # [id number] and Faxed .

## 2024-07-29 DIAGNOSIS — F33.42 RECURRENT MAJOR DEPRESSIVE DISORDER, IN FULL REMISSION (HCC): ICD-10-CM

## 2024-07-29 RX ORDER — VENLAFAXINE HYDROCHLORIDE 75 MG/1
CAPSULE, EXTENDED RELEASE ORAL DAILY
Qty: 90 CAPSULE | Refills: 2 | OUTPATIENT
Start: 2024-07-29

## 2024-08-07 DIAGNOSIS — G47.00 INSOMNIA, UNSPECIFIED TYPE: ICD-10-CM

## 2024-08-07 RX ORDER — VENLAFAXINE HYDROCHLORIDE 150 MG/1
CAPSULE, EXTENDED RELEASE ORAL DAILY
Qty: 90 CAPSULE | Refills: 1 | OUTPATIENT
Start: 2024-08-07

## 2024-08-07 RX ORDER — TRAZODONE HYDROCHLORIDE 100 MG/1
100 TABLET ORAL NIGHTLY
Qty: 90 TABLET | Refills: 1 | Status: SHIPPED | OUTPATIENT
Start: 2024-08-07

## 2024-08-08 DIAGNOSIS — L70.0 ACNE VULGARIS: ICD-10-CM

## 2024-08-09 RX ORDER — DOXYCYCLINE HYCLATE 100 MG
100 TABLET ORAL DAILY
Qty: 90 TABLET | Refills: 1 | OUTPATIENT
Start: 2024-08-09

## 2024-08-12 RX ORDER — DOXYCYCLINE HYCLATE 100 MG
100 TABLET ORAL DAILY
Qty: 90 TABLET | Refills: 1 | Status: SHIPPED | OUTPATIENT
Start: 2024-08-12 | End: 2025-02-08

## 2024-09-06 DIAGNOSIS — F33.42 RECURRENT MAJOR DEPRESSIVE DISORDER, IN FULL REMISSION (HCC): ICD-10-CM

## 2024-09-06 RX ORDER — VENLAFAXINE HYDROCHLORIDE 37.5 MG/1
37.5 CAPSULE, EXTENDED RELEASE ORAL DAILY
Qty: 30 CAPSULE | Refills: 3 | Status: SHIPPED | OUTPATIENT
Start: 2024-09-06

## 2024-10-07 ENCOUNTER — OFFICE VISIT (OUTPATIENT)
Age: 29
End: 2024-10-07
Payer: COMMERCIAL

## 2024-10-07 VITALS
HEART RATE: 96 BPM | TEMPERATURE: 98.9 F | DIASTOLIC BLOOD PRESSURE: 74 MMHG | WEIGHT: 193 LBS | SYSTOLIC BLOOD PRESSURE: 121 MMHG | BODY MASS INDEX: 31.02 KG/M2 | HEIGHT: 66 IN

## 2024-10-07 DIAGNOSIS — K70.31 ALCOHOLIC CIRRHOSIS OF LIVER WITH ASCITES (HCC): Primary | ICD-10-CM

## 2024-10-07 LAB
ALBUMIN SERPL-MCNC: 4.1 G/DL (ref 3.5–5)
ALBUMIN/GLOB SERPL: 1.3 (ref 1.1–2.2)
ALP SERPL-CCNC: 73 U/L (ref 45–117)
ALT SERPL-CCNC: 26 U/L (ref 12–78)
ANION GAP SERPL CALC-SCNC: 1 MMOL/L (ref 2–12)
AST SERPL-CCNC: 23 U/L (ref 15–37)
BILIRUB DIRECT SERPL-MCNC: 0.2 MG/DL (ref 0–0.2)
BILIRUB SERPL-MCNC: 0.5 MG/DL (ref 0.2–1)
BUN SERPL-MCNC: 12 MG/DL (ref 6–20)
BUN/CREAT SERPL: 14 (ref 12–20)
CALCIUM SERPL-MCNC: 9.6 MG/DL (ref 8.5–10.1)
CHLORIDE SERPL-SCNC: 106 MMOL/L (ref 97–108)
CO2 SERPL-SCNC: 31 MMOL/L (ref 21–32)
CREAT SERPL-MCNC: 0.87 MG/DL (ref 0.55–1.02)
ERYTHROCYTE [DISTWIDTH] IN BLOOD BY AUTOMATED COUNT: 12.8 % (ref 11.5–14.5)
GLOBULIN SER CALC-MCNC: 3.2 G/DL (ref 2–4)
GLUCOSE SERPL-MCNC: 101 MG/DL (ref 65–100)
HCT VFR BLD AUTO: 41.8 % (ref 35–47)
HGB BLD-MCNC: 14.1 G/DL (ref 11.5–16)
INR PPP: 1.1 (ref 0.9–1.1)
MCH RBC QN AUTO: 30.5 PG (ref 26–34)
MCHC RBC AUTO-ENTMCNC: 33.7 G/DL (ref 30–36.5)
MCV RBC AUTO: 90.5 FL (ref 80–99)
NRBC # BLD: 0 K/UL (ref 0–0.01)
NRBC BLD-RTO: 0 PER 100 WBC
PLATELET # BLD AUTO: 101 K/UL (ref 150–400)
PMV BLD AUTO: 12.1 FL (ref 8.9–12.9)
POTASSIUM SERPL-SCNC: 4.1 MMOL/L (ref 3.5–5.1)
PROT SERPL-MCNC: 7.3 G/DL (ref 6.4–8.2)
PROTHROMBIN TIME: 11.3 SEC (ref 9–11.1)
RBC # BLD AUTO: 4.62 M/UL (ref 3.8–5.2)
SODIUM SERPL-SCNC: 138 MMOL/L (ref 136–145)
WBC # BLD AUTO: 6.7 K/UL (ref 3.6–11)

## 2024-10-07 PROCEDURE — 99214 OFFICE O/P EST MOD 30 MIN: CPT | Performed by: PHYSICIAN ASSISTANT

## 2024-10-07 RX ORDER — VALACYCLOVIR HYDROCHLORIDE 1 G/1
1000 TABLET, FILM COATED ORAL 2 TIMES DAILY
COMMUNITY
Start: 2024-07-07

## 2024-10-07 NOTE — PROGRESS NOTES
Chief Complaint   Patient presents with    Follow-up     \"Have you been to the ER, urgent care clinic since your last visit?  Hospitalized since your last visit?\"    NO    “Have you seen or consulted any other health care providers outside our system since your last visit?”    NO     “Have you had a pap smear?”    NO    No cervical cancer screening on file              
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     PHYSICAL EXAMINATION:  /74 (Site: Right Upper Arm, Position: Sitting, Cuff Size: Medium Adult)   Pulse 96   Temp 98.9 °F (37.2 °C)   Ht 1.676 m (5' 6\")   Wt 87.5 kg (193 lb)   BMI 31.15 kg/m²     General: No acute distress.   Eyes: Sclera anicteric.   ENT: No oral lesions.  Thyroid normal.  Nodes: No adenopathy.   Skin: No spider angiomata.  No jaundice.  No palmar erythema.  Respiratory: Lungs clear to auscultation.   Cardiovascular: Regular heart rate.  No murmurs.  No JVD.  Abdomen: Soft non-tender, liver size normal to percussion/palpation.  Spleen tip palpable. No obvious ascites.  Extremities: No edema.  No muscle wasting.  No gross arthritic changes.  Neurologic: Alert and oriented.  Cranial nerves grossly intact.  No asterixis.    LABORATORY STUDIES:   Latest Ref Rng 10/30/2023 1/31/2024 5/7/2024   [facility] - Routine Labs       WBC 3.4 - 10.8 x10E3/uL 3.7  5.3  6.5    ANC 1.4 - 7.0 x10E3/uL 2.1      HGB 11.1 - 15.9 g/dL 12.5  14.2  13.7     - 450 x10E3/uL 115 (L)  106 (L)  100 (LL)    INR 0.9 - 1.2  1.1  1.2 (H)  1.1    AST 0 - 40 IU/L 27  31  27    ALT 0 - 32 IU/L 16  32  23    Alk Phos 44 - 121 IU/L 90  76  75    Bili, Total 0.0 - 1.2 mg/dL 0.4  0.7  0.4    Bili, Direct 0.00 - 0.40 mg/dL 0.14  0.2  0.17    Albumin 4.0 - 5.0 g/dL 4.2  4.5  4.4    BUN 6 - 20 mg/dL 14  13  12    Creat 0.57 - 1.00 mg/dL 0.69  0.66  0.74    Na 134 - 144 mmol/L 140  138  139    K 3.5 - 5.2 mmol/L 4.1  4.6  4.5    Cl 96 - 106 mmol/L 103  105  102    CO2 20 - 29 mmol/L 23  28  21    Glucose 70 - 99 mg/dL 127 (H)  83  110 (H)    Magnesium 1.6 - 2.4 mg/dL      Ammonia 29 - 112 ug/dL      Hemoglobin A1C 4.8 - 5.6 %          Latest Ref Rng 10/30/2023 1/31/2024 5/7/2024   [facility] - Cancer Screening       AFP 0.0 - 4.7 ng/mL 2.5  1.5  1.7    AFP-L3% 0.0 - 9.9 % Comment  Comment  Comment    Additional lab values drawn at today's office visit are pending at the time of

## 2024-10-08 NOTE — RESULT ENCOUNTER NOTE
Pt notified via [facility] message of stable findings. Will follow-up on AFP when available.  Follow-up as scheduled.

## 2024-10-09 ENCOUNTER — PATIENT MESSAGE (OUTPATIENT)
Facility: CLINIC | Age: 29
End: 2024-10-09

## 2024-10-09 DIAGNOSIS — F33.42 RECURRENT MAJOR DEPRESSIVE DISORDER, IN FULL REMISSION (HCC): ICD-10-CM

## 2024-10-09 LAB
AFP L3 MFR SERPL: NORMAL % (ref 0–9.9)
AFP SERPL-MCNC: 1.9 NG/ML (ref 0–4.7)

## 2024-10-09 RX ORDER — VENLAFAXINE HYDROCHLORIDE 150 MG/1
CAPSULE, EXTENDED RELEASE ORAL DAILY
Qty: 90 CAPSULE | Refills: 1 | OUTPATIENT
Start: 2024-10-09

## 2024-10-09 RX ORDER — VENLAFAXINE HYDROCHLORIDE 37.5 MG/1
37.5 CAPSULE, EXTENDED RELEASE ORAL DAILY
Qty: 30 CAPSULE | Refills: 3 | Status: SHIPPED | OUTPATIENT
Start: 2024-10-09 | End: 2024-10-11 | Stop reason: DRUGHIGH

## 2024-10-09 NOTE — TELEPHONE ENCOUNTER
Patient is requesting a refill on her Venlafaxine (Effexor) 150 mg. She is scheduled with an appt in January with ZORAIDA Jeong. She would like for them to be sent to [facility] on [address]

## 2024-10-11 RX ORDER — VENLAFAXINE HYDROCHLORIDE 150 MG/1
150 CAPSULE, EXTENDED RELEASE ORAL DAILY
Qty: 30 CAPSULE | Refills: 5 | Status: SHIPPED | OUTPATIENT
Start: 2024-10-11

## 2024-10-11 NOTE — TELEPHONE ENCOUNTER
Spoke to patient she stated that she tried to come off of the medication but it was not working so she just started taking the 150mg dose again and would like that sent to the pharmacy.

## 2024-11-11 DIAGNOSIS — F33.42 RECURRENT MAJOR DEPRESSIVE DISORDER, IN FULL REMISSION (HCC): ICD-10-CM

## 2024-11-11 RX ORDER — VENLAFAXINE HYDROCHLORIDE 150 MG/1
150 CAPSULE, EXTENDED RELEASE ORAL DAILY
Qty: 30 CAPSULE | Refills: 5 | Status: SHIPPED | OUTPATIENT
Start: 2024-11-11

## 2024-11-11 NOTE — TELEPHONE ENCOUNTER
Patient called in requesting a refill on her Venlafaxine (Effexor) 150 mg. She would like for it to be sent to the [facility] on [address].

## 2024-12-08 DIAGNOSIS — F33.42 RECURRENT MAJOR DEPRESSIVE DISORDER, IN FULL REMISSION (HCC): ICD-10-CM

## 2024-12-09 RX ORDER — VENLAFAXINE HYDROCHLORIDE 150 MG/1
CAPSULE, EXTENDED RELEASE ORAL DAILY
Qty: 90 CAPSULE | Refills: 2 | Status: SHIPPED | OUTPATIENT
Start: 2024-12-09

## 2025-01-01 DIAGNOSIS — G47.00 INSOMNIA, UNSPECIFIED TYPE: ICD-10-CM

## 2025-01-02 RX ORDER — TRAZODONE HYDROCHLORIDE 100 MG/1
100 TABLET ORAL NIGHTLY
Qty: 90 TABLET | Refills: 1 | Status: SHIPPED | OUTPATIENT
Start: 2025-01-02

## 2025-01-21 SDOH — ECONOMIC STABILITY: INCOME INSECURITY: IN THE LAST 12 MONTHS, WAS THERE A TIME WHEN YOU WERE NOT ABLE TO PAY THE MORTGAGE OR RENT ON TIME?: NO

## 2025-01-21 SDOH — ECONOMIC STABILITY: FOOD INSECURITY: WITHIN THE PAST 12 MONTHS, THE FOOD YOU BOUGHT JUST DIDN'T LAST AND YOU DIDN'T HAVE MONEY TO GET MORE.: NEVER TRUE

## 2025-01-21 SDOH — ECONOMIC STABILITY: FOOD INSECURITY: WITHIN THE PAST 12 MONTHS, YOU WORRIED THAT YOUR FOOD WOULD RUN OUT BEFORE YOU GOT MONEY TO BUY MORE.: NEVER TRUE

## 2025-01-21 SDOH — ECONOMIC STABILITY: TRANSPORTATION INSECURITY
IN THE PAST 12 MONTHS, HAS THE LACK OF TRANSPORTATION KEPT YOU FROM MEDICAL APPOINTMENTS OR FROM GETTING MEDICATIONS?: NO

## 2025-01-21 SDOH — ECONOMIC STABILITY: TRANSPORTATION INSECURITY
IN THE PAST 12 MONTHS, HAS LACK OF TRANSPORTATION KEPT YOU FROM MEETINGS, WORK, OR FROM GETTING THINGS NEEDED FOR DAILY LIVING?: NO

## 2025-01-22 ENCOUNTER — OFFICE VISIT (OUTPATIENT)
Facility: CLINIC | Age: 30
End: 2025-01-22
Payer: MEDICAID

## 2025-01-22 VITALS
HEIGHT: 66 IN | SYSTOLIC BLOOD PRESSURE: 100 MMHG | BODY MASS INDEX: 31.98 KG/M2 | HEART RATE: 75 BPM | DIASTOLIC BLOOD PRESSURE: 70 MMHG | TEMPERATURE: 97.5 F | WEIGHT: 199 LBS | RESPIRATION RATE: 16 BRPM

## 2025-01-22 DIAGNOSIS — F33.41 RECURRENT MAJOR DEPRESSIVE DISORDER, IN PARTIAL REMISSION (HCC): Primary | ICD-10-CM

## 2025-01-22 DIAGNOSIS — K70.31 ALCOHOLIC CIRRHOSIS OF LIVER WITH ASCITES (HCC): ICD-10-CM

## 2025-01-22 DIAGNOSIS — E28.2 POLYCYSTIC OVARIAN SYNDROME: ICD-10-CM

## 2025-01-22 DIAGNOSIS — D69.6 THROMBOCYTOPENIA (HCC): ICD-10-CM

## 2025-01-22 PROBLEM — F10.90 ALCOHOL USE DISORDER: Status: RESOLVED | Noted: 2023-05-25 | Resolved: 2025-01-22

## 2025-01-22 PROBLEM — K80.20 CALCULUS OF GALLBLADDER WITHOUT CHOLECYSTITIS: Status: RESOLVED | Noted: 2022-10-19 | Resolved: 2025-01-22

## 2025-01-22 PROBLEM — R00.0 SINUS TACHYCARDIA: Status: RESOLVED | Noted: 2023-01-09 | Resolved: 2025-01-22

## 2025-01-22 PROBLEM — F32.A DEPRESSION: Status: RESOLVED | Noted: 2023-03-06 | Resolved: 2025-01-22

## 2025-01-22 PROBLEM — G62.9 NEUROPATHY: Status: RESOLVED | Noted: 2021-12-11 | Resolved: 2025-01-22

## 2025-01-22 PROBLEM — F10.11 HISTORY OF ALCOHOL ABUSE: Status: ACTIVE | Noted: 2025-01-22

## 2025-01-22 PROCEDURE — 99214 OFFICE O/P EST MOD 30 MIN: CPT

## 2025-01-22 ASSESSMENT — PATIENT HEALTH QUESTIONNAIRE - PHQ9
10. IF YOU CHECKED OFF ANY PROBLEMS, HOW DIFFICULT HAVE THESE PROBLEMS MADE IT FOR YOU TO DO YOUR WORK, TAKE CARE OF THINGS AT HOME, OR GET ALONG WITH OTHER PEOPLE: NOT DIFFICULT AT ALL
SUM OF ALL RESPONSES TO PHQ QUESTIONS 1-9: 0
7. TROUBLE CONCENTRATING ON THINGS, SUCH AS READING THE NEWSPAPER OR WATCHING TELEVISION: NOT AT ALL
2. FEELING DOWN, DEPRESSED OR HOPELESS: NOT AT ALL
SUM OF ALL RESPONSES TO PHQ QUESTIONS 1-9: 0
SUM OF ALL RESPONSES TO PHQ QUESTIONS 1-9: 0
SUM OF ALL RESPONSES TO PHQ9 QUESTIONS 1 & 2: 0
5. POOR APPETITE OR OVEREATING: NOT AT ALL
4. FEELING TIRED OR HAVING LITTLE ENERGY: NOT AT ALL
3. TROUBLE FALLING OR STAYING ASLEEP: NOT AT ALL
8. MOVING OR SPEAKING SO SLOWLY THAT OTHER PEOPLE COULD HAVE NOTICED. OR THE OPPOSITE, BEING SO FIGETY OR RESTLESS THAT YOU HAVE BEEN MOVING AROUND A LOT MORE THAN USUAL: NOT AT ALL
9. THOUGHTS THAT YOU WOULD BE BETTER OFF DEAD, OR OF HURTING YOURSELF: NOT AT ALL
6. FEELING BAD ABOUT YOURSELF - OR THAT YOU ARE A FAILURE OR HAVE LET YOURSELF OR YOUR FAMILY DOWN: NOT AT ALL
1. LITTLE INTEREST OR PLEASURE IN DOING THINGS: NOT AT ALL
SUM OF ALL RESPONSES TO PHQ QUESTIONS 1-9: 0

## 2025-01-22 NOTE — PROGRESS NOTES
Chief Complaint   Patient presents with    Follow-up Chronic Condition     PHQ-9 Total Score: 0 (1/22/2025  4:08 PM)  Thoughts that you would be better off dead, or of hurting yourself in some way: 0 (1/22/2025  4:08 PM)    \"Have you been to the ER, urgent care clinic since your last visit?  Hospitalized since your last visit?\"    NO    “Have you seen or consulted any other health care providers outside our system since your last visit?”    NO     “Have you had a pap smear?”    NO    No cervical cancer screening on file         Click Here for Release of Records Request             
Maternal Grandmother         Alzeheimers    Diabetes Maternal Grandfather     Atopy Sister     Hypertension Neg Hx     Heart Disease Neg Hx      Social History     Socioeconomic History    Marital status:      Spouse name: Not on file    Number of children: Not on file    Years of education: Not on file    Highest education level: Not on file   Occupational History    Not on file   Tobacco Use    Smoking status: Former     Current packs/day: 0.50     Average packs/day: 0.5 packs/day for 3.1 years (1.5 ttl pk-yrs)     Types: Cigarettes     Start date: 2022     Quit date: 12/21/2021    Smokeless tobacco: Current   Vaping Use    Vaping status: Every Day    Substances: Nicotine   Substance and Sexual Activity    Alcohol use: Not Currently    Drug use: Not Currently     Types: Marijuana (Weed)    Sexual activity: Defer     Birth control/protection: I.U.D.   Other Topics Concern    Not on file   Social History Narrative    Not on file     Social Determinants of Health     Financial Resource Strain: Low Risk  (7/5/2024)    Overall Financial Resource Strain (CARDIA)     Difficulty of Paying Living Expenses: Not hard at all   Food Insecurity: No Food Insecurity (1/21/2025)    Hunger Vital Sign     Worried About Running Out of Food in the Last Year: Never true     Ran Out of Food in the Last Year: Never true   Transportation Needs: No Transportation Needs (1/21/2025)    PRAPARE - Transportation     Lack of Transportation (Medical): No     Lack of Transportation (Non-Medical): No   Physical Activity: Not on file   Stress: Not on file   Social Connections: Not on file   Intimate Partner Violence: Not on file   Housing Stability: Low Risk  (1/21/2025)    Housing Stability Vital Sign     Unable to Pay for Housing in the Last Year: No     Number of Times Moved in the Last Year: 0     Homeless in the Last Year: No     Current Outpatient Medications on File Prior to Visit   Medication Sig Dispense Refill    traZODone

## 2025-01-22 NOTE — ASSESSMENT & PLAN NOTE
-Discussed with patient insulin resistance etiology of PCOS and contributing factors including poor diet, lack of exercise, and genetic factors  -Discussed with patient the she meets the [place] Criteria for diagnosis with symptoms including: Hx acne, hair loss, inability to lose weight, irregular menses  -Discussed with patient long term risks of unmanaged PCOS including hyperlipidemia, increased risk for heart attack and stroke, prediabetes, diabetes, metabolic syndrome, and endometrial hyperplasia.

## 2025-01-30 LAB
CHOLEST SERPL-MCNC: 137 MG/DL (ref 100–199)
HBA1C MFR BLD: 5.1 % (ref 4.8–5.6)
HDLC SERPL-MCNC: 64 MG/DL
LDLC SERPL CALC-MCNC: 60 MG/DL (ref 0–99)
TRIGL SERPL-MCNC: 60 MG/DL (ref 0–149)
VLDLC SERPL CALC-MCNC: 13 MG/DL (ref 5–40)

## 2025-03-16 ENCOUNTER — PATIENT MESSAGE (OUTPATIENT)
Facility: CLINIC | Age: 30
End: 2025-03-16

## 2025-03-21 RX ORDER — TRETINOIN 0.25 MG/G
CREAM TOPICAL NIGHTLY
Qty: 45 G | Refills: 2 | Status: SHIPPED | OUTPATIENT
Start: 2025-03-21

## 2025-03-21 RX ORDER — TRETINOIN 0.25 MG/G
CREAM TOPICAL NIGHTLY
COMMUNITY
End: 2025-03-21 | Stop reason: SDUPTHER

## 2025-04-07 ENCOUNTER — OFFICE VISIT (OUTPATIENT)
Age: 30
End: 2025-04-07
Payer: MEDICAID

## 2025-04-07 VITALS
HEIGHT: 66 IN | OXYGEN SATURATION: 99 % | HEART RATE: 84 BPM | TEMPERATURE: 97.4 F | SYSTOLIC BLOOD PRESSURE: 114 MMHG | BODY MASS INDEX: 31.66 KG/M2 | WEIGHT: 197 LBS | DIASTOLIC BLOOD PRESSURE: 77 MMHG

## 2025-04-07 DIAGNOSIS — K70.30 ALCOHOLIC CIRRHOSIS OF LIVER WITHOUT ASCITES (HCC): Primary | ICD-10-CM

## 2025-04-07 PROCEDURE — 99214 OFFICE O/P EST MOD 30 MIN: CPT | Performed by: PHYSICIAN ASSISTANT

## 2025-04-07 RX ORDER — DOXYCYCLINE HYCLATE 100 MG
100 TABLET ORAL DAILY
COMMUNITY
Start: 2025-03-22

## 2025-04-07 ASSESSMENT — ANXIETY QUESTIONNAIRES
IF YOU CHECKED OFF ANY PROBLEMS ON THIS QUESTIONNAIRE, HOW DIFFICULT HAVE THESE PROBLEMS MADE IT FOR YOU TO DO YOUR WORK, TAKE CARE OF THINGS AT HOME, OR GET ALONG WITH OTHER PEOPLE: NOT DIFFICULT AT ALL
2. NOT BEING ABLE TO STOP OR CONTROL WORRYING: NOT AT ALL
5. BEING SO RESTLESS THAT IT IS HARD TO SIT STILL: NOT AT ALL
1. FEELING NERVOUS, ANXIOUS, OR ON EDGE: NOT AT ALL
3. WORRYING TOO MUCH ABOUT DIFFERENT THINGS: NOT AT ALL
6. BECOMING EASILY ANNOYED OR IRRITABLE: NOT AT ALL
7. FEELING AFRAID AS IF SOMETHING AWFUL MIGHT HAPPEN: NOT AT ALL
4. TROUBLE RELAXING: NOT AT ALL
GAD7 TOTAL SCORE: 0

## 2025-04-07 ASSESSMENT — PATIENT HEALTH QUESTIONNAIRE - PHQ9
2. FEELING DOWN, DEPRESSED OR HOPELESS: NOT AT ALL
10. IF YOU CHECKED OFF ANY PROBLEMS, HOW DIFFICULT HAVE THESE PROBLEMS MADE IT FOR YOU TO DO YOUR WORK, TAKE CARE OF THINGS AT HOME, OR GET ALONG WITH OTHER PEOPLE: NOT DIFFICULT AT ALL
1. LITTLE INTEREST OR PLEASURE IN DOING THINGS: NOT AT ALL
6. FEELING BAD ABOUT YOURSELF - OR THAT YOU ARE A FAILURE OR HAVE LET YOURSELF OR YOUR FAMILY DOWN: NOT AT ALL
SUM OF ALL RESPONSES TO PHQ QUESTIONS 1-9: 0
SUM OF ALL RESPONSES TO PHQ QUESTIONS 1-9: 0
9. THOUGHTS THAT YOU WOULD BE BETTER OFF DEAD, OR OF HURTING YOURSELF: NOT AT ALL
SUM OF ALL RESPONSES TO PHQ QUESTIONS 1-9: 0
4. FEELING TIRED OR HAVING LITTLE ENERGY: NOT AT ALL
8. MOVING OR SPEAKING SO SLOWLY THAT OTHER PEOPLE COULD HAVE NOTICED. OR THE OPPOSITE, BEING SO FIGETY OR RESTLESS THAT YOU HAVE BEEN MOVING AROUND A LOT MORE THAN USUAL: NOT AT ALL
7. TROUBLE CONCENTRATING ON THINGS, SUCH AS READING THE NEWSPAPER OR WATCHING TELEVISION: NOT AT ALL
DEPRESSION UNABLE TO ASSESS: FUNCTIONAL CAPACITY MOTIVATION LIMITS ACCURACY
SUM OF ALL RESPONSES TO PHQ QUESTIONS 1-9: 0
5. POOR APPETITE OR OVEREATING: NOT AT ALL
3. TROUBLE FALLING OR STAYING ASLEEP: NOT AT ALL

## 2025-04-07 NOTE — PROGRESS NOTES
[facility]     Jeff Pickard MD, FACP, MACG, FAASLD   MD Petra Flor PA-C April S Ashworth, [facility]-BC   Janiya Patterson, [facility]   Winsome Ellison, FNP-C  Jamar Iqbal, [facility]-C   Allegra Noguera, [facility]   at [facility]   [address], [address]   [place]  [address] [phone]   FAX: [phone]  [facility]   [address], [address]   [place]  [address] [phone]   FAX: [phone]       Patient Care Team:  Gladys Jeong APRN - CNP as PCP - General (Nurse Practitioner)  Gladys Jeong APRN - CNP as PCP - Empaneled Provider  Alex Brannon MD (Dermatology)    Patient Active Problem List   Diagnosis    Polycystic ovarian syndrome    Thrombocytopenia    Splenomegaly    Recurrent major depressive disorder, in partial remission    Alcoholic cirrhosis of liver with ascites (HCC)    Insomnia    History of alcohol abuse     Lia Berg is being seen at [facility] for management of cirrhosis secondary to alcoholic liver disease.  The active problem list, all pertinent past medical history, medications, radiologic findings and laboratory findings related to the liver disorder were reviewed and discussed with the patient.      The patient is a 29 y.o.  female who has consumed alcohol in excess over several years.  She was hospitalized with alcoholic hepatitis and ascites in 1/2022.    She had been abstinent from alcohol late 6/2022 through late 10/2022 and reports that she had multiple relapses through early 3/2023.  She was seen in the [facility] at that time for depression and suicide attempt on 3/4/23 with pills and alcohol.  She was stopped by her boyfriend and brought to the hospital, admitted for 4 days.  She was then transitioned to

## 2025-04-07 NOTE — PROGRESS NOTES
Chief Complaint   Patient presents with    Follow-up     Vitals:    04/07/25 1521   BP: 114/77   Pulse: 84   Temp: 97.4 °F (36.3 °C)   SpO2: 99%     \"Have you been to the ER, urgent care clinic since your last visit?  Hospitalized since your last visit?\"    NO    “Have you seen or consulted any other health care providers outside our system since your last visit?”    NO     “Have you had a pap smear?”    NO    No cervical cancer screening on file

## 2025-04-08 LAB
ALBUMIN SERPL-MCNC: 4.2 G/DL (ref 3.5–5)
ALBUMIN/GLOB SERPL: 1.1 (ref 1.1–2.2)
ALP SERPL-CCNC: 67 U/L (ref 45–117)
ALT SERPL-CCNC: 27 U/L (ref 12–78)
ANION GAP SERPL CALC-SCNC: 7 MMOL/L (ref 2–12)
AST SERPL-CCNC: 24 U/L (ref 15–37)
BILIRUB DIRECT SERPL-MCNC: 0.2 MG/DL (ref 0–0.2)
BILIRUB SERPL-MCNC: 0.5 MG/DL (ref 0.2–1)
BUN SERPL-MCNC: 14 MG/DL (ref 6–20)
BUN/CREAT SERPL: 19 (ref 12–20)
CALCIUM SERPL-MCNC: 9.3 MG/DL (ref 8.5–10.1)
CHLORIDE SERPL-SCNC: 104 MMOL/L (ref 97–108)
CO2 SERPL-SCNC: 26 MMOL/L (ref 21–32)
CREAT SERPL-MCNC: 0.75 MG/DL (ref 0.55–1.02)
ERYTHROCYTE [DISTWIDTH] IN BLOOD BY AUTOMATED COUNT: 12.4 % (ref 11.5–14.5)
GLOBULIN SER CALC-MCNC: 3.7 G/DL (ref 2–4)
GLUCOSE SERPL-MCNC: 79 MG/DL (ref 65–100)
HCT VFR BLD AUTO: 44.9 % (ref 35–47)
HGB BLD-MCNC: 15 G/DL (ref 11.5–16)
INR PPP: 1.1 (ref 0.9–1.1)
MCH RBC QN AUTO: 30.9 PG (ref 26–34)
MCHC RBC AUTO-ENTMCNC: 33.4 G/DL (ref 30–36.5)
MCV RBC AUTO: 92.4 FL (ref 80–99)
NRBC # BLD: 0 K/UL (ref 0–0.01)
NRBC BLD-RTO: 0 PER 100 WBC
PLATELET # BLD AUTO: 119 K/UL (ref 150–400)
PMV BLD AUTO: 11.8 FL (ref 8.9–12.9)
POTASSIUM SERPL-SCNC: 3.9 MMOL/L (ref 3.5–5.1)
PROT SERPL-MCNC: 7.9 G/DL (ref 6.4–8.2)
PROTHROMBIN TIME: 12 SEC (ref 9.2–11.2)
RBC # BLD AUTO: 4.86 M/UL (ref 3.8–5.2)
SODIUM SERPL-SCNC: 137 MMOL/L (ref 136–145)
WBC # BLD AUTO: 7 K/UL (ref 3.6–11)

## 2025-04-09 ENCOUNTER — RESULTS FOLLOW-UP (OUTPATIENT)
Age: 30
End: 2025-04-09

## 2025-04-09 NOTE — RESULT ENCOUNTER NOTE
Pt notified via [facility] message of stable findings. Will follow-up on AFP when available.  Follow-up as scheduled for US tomorrow and appt in Fall 2025.

## 2025-04-10 ENCOUNTER — HOSPITAL ENCOUNTER (OUTPATIENT)
Facility: HOSPITAL | Age: 30
Discharge: HOME | End: 2025-04-13
Payer: MEDICAID

## 2025-04-10 DIAGNOSIS — K70.31 ALCOHOLIC CIRRHOSIS OF LIVER WITH ASCITES (HCC): ICD-10-CM

## 2025-04-10 LAB
AFP L3 MFR SERPL: NORMAL % (ref 0–9.9)
AFP SERPL-MCNC: 1.9 NG/ML (ref 0–4.7)

## 2025-04-10 PROCEDURE — 76700 US EXAM ABDOM COMPLETE: CPT

## 2025-04-14 ENCOUNTER — RESULTS FOLLOW-UP (OUTPATIENT)
Age: 30
End: 2025-04-14

## 2025-04-14 ENCOUNTER — CLINICAL DOCUMENTATION (OUTPATIENT)
Age: 30
End: 2025-04-14

## 2025-04-14 DIAGNOSIS — K70.30 ALCOHOLIC CIRRHOSIS OF LIVER WITHOUT ASCITES (HCC): Primary | ICD-10-CM

## 2025-04-19 NOTE — BSMART NOTE
ED provider requested resources for detox and alcohol treatment. It is noted that patient is not SI/HI, however looking for detox. Patient was provided resources. Patient seemed to have interest in IOP with [facility].  Patient was educated on the program.     Rohan Agustin, Resident in Counseling
Yes

## 2025-05-05 ENCOUNTER — TELEMEDICINE (OUTPATIENT)
Facility: CLINIC | Age: 30
End: 2025-05-05
Payer: MEDICAID

## 2025-05-05 DIAGNOSIS — Z91.89 AT RISK FOR OSTEOPOROSIS IN PREMENOPAUSAL PATIENT: ICD-10-CM

## 2025-05-05 DIAGNOSIS — N95.9 AT RISK FOR OSTEOPOROSIS IN PREMENOPAUSAL PATIENT: ICD-10-CM

## 2025-05-05 DIAGNOSIS — Z91.89 AT RISK FOR HYPOGLYCEMIA: Primary | ICD-10-CM

## 2025-05-05 PROCEDURE — 99214 OFFICE O/P EST MOD 30 MIN: CPT

## 2025-05-05 RX ORDER — BLOOD-GLUCOSE METER
1 KIT MISCELLANEOUS DAILY
Qty: 1 KIT | Refills: 0 | Status: SHIPPED | OUTPATIENT
Start: 2025-05-05

## 2025-05-05 RX ORDER — MAGNESIUM 30 MG
30 TABLET ORAL 2 TIMES DAILY
COMMUNITY

## 2025-05-05 RX ORDER — MULTIVIT-MIN/IRON/FOLIC ACID/K 18-600-40
2000 CAPSULE ORAL DAILY
COMMUNITY

## 2025-05-05 RX ORDER — MULTIVIT WITH MINERALS/LUTEIN
250 TABLET ORAL DAILY
COMMUNITY

## 2025-05-05 NOTE — PROGRESS NOTES
Lia Berg, was evaluated through a synchronous (real-time) audio-video encounter. The patient (or guardian if applicable) is aware that this is a billable service, which includes applicable co-pays. This Virtual Visit was conducted with patient's (and/or legal guardian's) consent. Patient identification was verified, and a caregiver was present when appropriate.   The patient was located at Home: [address] [address]  Provider was located at Home (Appt Dept State): [place]  Confirm you are appropriately licensed, registered, or certified to deliver care in the state where the patient is located as indicated above. If you are not or unsure, please re-schedule the visit: Yes, I confirm.     Lia Berg (:  1995) is a Established patient, presenting virtually for evaluation of the following:      Below is the assessment and plan developed based on review of pertinent history, physical exam, labs, studies, and medications.     Assessment & Plan  At risk for hypoglycemia  -Advised patient to check daily fasting morning blood glucose, a daily post prandial glucose, and PRN for reported episodes and write down readings. Advised her to send readings in two weeks.   -Advised her to always keep snack nearby, discussed possible impairment in glycogenesis because of liver dysfunction.     Orders:    glucose monitoring kit; 1 kit by Does not apply route daily Use for once daily glucose testing    [facility] - Jeny Potts MD, Endocrinology, [place]    At risk for osteoporosis in premenopausal patient       Orders:    DEXA BONE DENSITY AXIAL SKELETON; Future      Return if symptoms worsen or fail to improve.       Subjective     At risk for hypoglycemia  Patient reports she has always had issues with her blood sugars dropping but feels like it has been getting progressively worse. She reports she will get really hot, shaky and weak. She reports this has been an issue her whole life. She

## 2025-05-05 NOTE — PROGRESS NOTES
The patient, Lia Berg, identity was verified by name and MRN.  Chief Complaint   Patient presents with    Blood Sugar Problem     Blood sugar has been dropping. Through the day often. Currently not taking any diabetic meds. Not checking sugar but gets very hot and weak shaky.     No LMP recorded. (Menstrual status: IUD).  There were no vitals taken for this visit.      3/9/2023    10:11 AM   Amb Fall Risk Assessment and TUG Test   Total Score        Information is confidential and restricted. Go to Review Flowsheets to unlock data.     No data recorded  \"Have you been to the ER, urgent care clinic since your last visit?  Hospitalized since your last visit?\"    NO    “Have you seen or consulted any other health care providers outside our system since your last visit?”    NO     “Have you had a pap smear?”    NO    No cervical cancer screening on file              Social History     Substance and Sexual Activity   Sexual Activity Defer    Birth control/protection: I.U.D.     Medication list reviewed and active medications noted. Patient is taking medications as directed.  See documentation in medication activity.  Allergies: allergy list reviewed, no new allergies added    [facility] Vitals Questionnaire       Question 5/5/2025  7:33 AM EDT - Filed by Patient    What is your height? 5’8”    What is your weight in pounds? 190    What is your top number blood pressure reading?       What is your bottom number blood pressure reading?       What is your pulse?       What is your temperature in Fahrenheit?        If you have a pulse oximeter, enter the reading here:       Patients with chronic lung disease who have a Peak Flow meter, please enter the reading here:       If you are having periods, please enter the date of your last menstrual period here:                 Lynne Garcia LPN

## 2025-05-05 NOTE — ASSESSMENT & PLAN NOTE
-Advised patient to check daily fasting morning blood glucose, a daily post prandial glucose, and PRN for reported episodes and write down readings. Advised her to send readings in two weeks.   -Advised her to always keep snack nearby, discussed possible impairment in glycogenesis because of liver dysfunction.     Orders:    glucose monitoring kit; 1 kit by Does not apply route daily Use for once daily glucose testing    [facility] - Jeny Potts MD, Endocrinology, [place]

## 2025-05-16 DIAGNOSIS — G47.00 INSOMNIA, UNSPECIFIED TYPE: ICD-10-CM

## 2025-05-16 NOTE — TELEPHONE ENCOUNTER
Medication Refill(s) via fax for:  traZODone (DESYREL) 100 MG tablet   Dose: 100 mg Route: Oral Frequency: NIGHTLY   Dispense Quantity: 90 tablet Refills: 1      send to pharmacy:  [facility]/pharmacy #[id number] - [place] - [address] -  [phone] - F [phone]     next scheduled Appt for:  9/24/2025 3:30 PM Gladys Jeong APRN - CNP       Please advise accordingly

## 2025-05-17 RX ORDER — TRAZODONE HYDROCHLORIDE 100 MG/1
100 TABLET ORAL NIGHTLY
Qty: 90 TABLET | Refills: 1 | Status: SHIPPED | OUTPATIENT
Start: 2025-05-17

## 2025-05-30 ENCOUNTER — PATIENT MESSAGE (OUTPATIENT)
Facility: CLINIC | Age: 30
End: 2025-05-30

## 2025-06-09 ENCOUNTER — OFFICE VISIT (OUTPATIENT)
Facility: CLINIC | Age: 30
End: 2025-06-09
Payer: MEDICAID

## 2025-06-09 VITALS
DIASTOLIC BLOOD PRESSURE: 70 MMHG | OXYGEN SATURATION: 98 % | TEMPERATURE: 97.7 F | HEART RATE: 98 BPM | SYSTOLIC BLOOD PRESSURE: 110 MMHG

## 2025-06-09 DIAGNOSIS — L70.0 ACNE VULGARIS: ICD-10-CM

## 2025-06-09 DIAGNOSIS — J01.90 ACUTE BACTERIAL SINUSITIS: Primary | ICD-10-CM

## 2025-06-09 DIAGNOSIS — B96.89 ACUTE BACTERIAL SINUSITIS: Primary | ICD-10-CM

## 2025-06-09 PROCEDURE — 99213 OFFICE O/P EST LOW 20 MIN: CPT | Performed by: NURSE PRACTITIONER

## 2025-06-09 RX ORDER — VALACYCLOVIR HYDROCHLORIDE 500 MG/1
TABLET, FILM COATED ORAL
COMMUNITY
Start: 2025-05-28

## 2025-06-09 RX ORDER — DOXYCYCLINE HYCLATE 100 MG
100 TABLET ORAL DAILY
Qty: 30 TABLET | Refills: 11 | Status: SHIPPED | OUTPATIENT
Start: 2025-06-09

## 2025-06-09 NOTE — PROGRESS NOTES
Chief Complaint   Patient presents with    Cold Symptoms     X 7 days. Sore throat, cough, sinus pressure.     \"Have you been to the ER, urgent care clinic since your last visit?  Hospitalized since your last visit?\"    NO    “Have you seen or consulted any other health care providers outside of [facility] since your last visit?”    NO     /70 (BP Site: Left Upper Arm, Patient Position: Sitting)   Pulse 98   Temp 97.7 °F (36.5 °C) (Temporal)   SpO2 98%      No data recorded         No questionnaires available.                           “Have you had a pap smear?”    NO    No cervical cancer screening on file             Click Here for Release of Records Request     Identified Patient with 2 Patient Identifiers-Name and

## 2025-06-09 NOTE — PROGRESS NOTES
Lia Berg (:  1995) is a 29 y.o. female, Established patient, here for evaluation of the following chief complaint(s):  Cold Symptoms (X 7 days. Sore throat, cough, sinus pressure.)         Assessment & Plan  1. Sinusitis.  - Reports a sore throat, sinus pressure, swollen lymph nodes, and a cough that started about a week ago.  - No history of bronchitis or asthma. Blood pressure is within normal limits. No fever reported.  - Discussed the use of DayQuil for symptom relief. Advised to continue current cough and cold medications for the remainder of the week.  - Prescription for doxycycline 30-day supply with 11 refills provided, with instructions to take it twice daily for the initial 5 days, then reduce to once daily. Prescription sent to pharmacy.    2. Medication management.  - Requested a refill for doxycycline for her skin condition.  - Prescription for doxycycline 30-day supply with 11 refills provided.  - Instructed to take doxycycline twice daily for the first 5 days and then once daily thereafter.  - Advised to continue current cough and cold medications for the remainder of the week.    Results    1. Acute bacterial sinusitis  -     doxycycline hyclate (VIBRA-TABS) 100 MG tablet; Take 1 tablet by mouth daily, Disp-30 tablet, R-11Normal  2. Acne vulgaris  -     doxycycline hyclate (VIBRA-TABS) 100 MG tablet; Take 1 tablet by mouth daily, Disp-30 tablet, R-11Normal    No follow-ups on file.       Subjective   History of Present Illness  The patient presents for evaluation of a sore throat, sinus pressure, and cough.    She began experiencing symptoms approximately one week ago, initially presenting as a sore throat. This was followed by significant sinus pressure and lymphadenopathy. Yesterday, she developed a cough, although the sinus pressure has slightly improved. She expresses concern about a potential progression of her cold to bronchitis. She reports no fever. She has been managing

## 2025-06-27 ENCOUNTER — OFFICE VISIT (OUTPATIENT)
Age: 30
End: 2025-06-27
Payer: MEDICAID

## 2025-06-27 VITALS
SYSTOLIC BLOOD PRESSURE: 113 MMHG | BODY MASS INDEX: 29.19 KG/M2 | DIASTOLIC BLOOD PRESSURE: 82 MMHG | HEIGHT: 68 IN | WEIGHT: 192.6 LBS | TEMPERATURE: 98.8 F | HEART RATE: 88 BPM | OXYGEN SATURATION: 100 %

## 2025-06-27 DIAGNOSIS — R53.1 WEAKNESS: Primary | ICD-10-CM

## 2025-06-27 DIAGNOSIS — L68.0 HIRSUTISM: ICD-10-CM

## 2025-06-27 DIAGNOSIS — K74.69 OTHER CIRRHOSIS OF LIVER (HCC): ICD-10-CM

## 2025-06-27 PROCEDURE — 99214 OFFICE O/P EST MOD 30 MIN: CPT | Performed by: INTERNAL MEDICINE

## 2025-06-27 NOTE — PROGRESS NOTES
Lia Berg is a 29 y.o. female here for   Chief Complaint   Patient presents with    New Patient    Hypoglycemia       1. Have you been to the ER, urgent care clinic since your last visit?  Hospitalized since your last visit? -N/A    2. Have you seen or consulted any other health care providers outside of the [facility] since your last visit?  Include any pap smears or colon screening.-N/A

## 2025-06-27 NOTE — PROGRESS NOTES
[facility]     Endocrine consultation     CC:   Chief Complaint   Patient presents with    New Patient    Hypoglycemia     PCP:Gladys Jeong APRN - CNP  Referring provider: Gladys Jeong Aprn - Cnp  [id number]  [address]  [place] [address]    29 y.o. female  has a past medical history of Acute kidney injury, Alcohol withdrawal (HCC), Anemia, Anxiety, Chronic pain, Contact dermatitis and eczema due to cause, Depression, Liver disease, Polycystic ovarian syndrome, Polycystic ovary syndrome, and Withdrawn from alcohol detoxification program.    History of Present Illness  The patient presents for evaluation of hypoglycemia and polycystic ovary syndrome (PCOS).    Hypoglycemia  - The patient reports lifelong episodes of hypoglycemia, with an increased frequency over the past year.  - Their primary care physician provided a glucose monitor for checking blood glucose levels upon waking after fasting, postprandial, and during hypoglycemic episodes. The device was not brought to today's visit.  - Typically, hypoglycemic symptoms manifest when blood glucose levels range between 85 and 80 mg/dL, with the lowest recorded level being in the 60s mg/dL.  - Symptoms, including shakiness and weakness, ameliorate with food intake.  - Hypoglycemic episodes occur both during fasting and sporadically.  - The patient has never experienced loss of consciousness due to hypoglycemia.  - The patient has been abstinent from alcohol since 11/12/2023.  - They report no recent changes in weight, fevers, chills, blurred vision, chest pain, palpitations, peripheral edema, dyspnea, wheezing, snoring, abdominal pain, nausea, vomiting, diarrhea, constipation, polyuria, dysuria, tremors, syncope, shakes, or temperature intolerance.  - Additionally, they report no unusual feelings of depression or anxiety.    Polycystic Ovary Syndrome (PCOS)  - The patient was diagnosed with PCOS during adolescence and

## 2025-07-08 DIAGNOSIS — F33.42 RECURRENT MAJOR DEPRESSIVE DISORDER, IN FULL REMISSION: ICD-10-CM

## 2025-07-08 NOTE — TELEPHONE ENCOUNTER
Received a fax from [facility] pharmacy on [address] requesting a refill of   venlafaxine (EFFEXOR XR) 150 MG extended release capsule     Last appt: 6/9/25  Next appt: 9/24/25

## 2025-07-09 LAB — DHEA-S SERPL-MCNC: 266 UG/DL (ref 84.8–378)

## 2025-07-09 RX ORDER — VENLAFAXINE HYDROCHLORIDE 150 MG/1
150 CAPSULE, EXTENDED RELEASE ORAL DAILY
Qty: 90 CAPSULE | Refills: 2 | Status: SHIPPED | OUTPATIENT
Start: 2025-07-09

## 2025-07-09 RX ORDER — TRETINOIN 0.25 MG/G
CREAM TOPICAL NIGHTLY
Qty: 45 G | Refills: 2 | Status: SHIPPED | OUTPATIENT
Start: 2025-07-09

## 2025-07-10 LAB — CORTIS AM PEAK SERPL-MCNC: 10.4 UG/DL (ref 6.2–19.4)

## 2025-07-11 LAB — TESTOST SERPL-MCNC: 70.3 NG/DL (ref 10–55)

## 2025-07-22 DIAGNOSIS — F33.42 RECURRENT MAJOR DEPRESSIVE DISORDER, IN FULL REMISSION: ICD-10-CM

## 2025-07-22 RX ORDER — VENLAFAXINE HYDROCHLORIDE 150 MG/1
150 CAPSULE, EXTENDED RELEASE ORAL DAILY
Qty: 90 CAPSULE | Refills: 2 | Status: SHIPPED | OUTPATIENT
Start: 2025-07-22

## 2025-08-04 RX ORDER — TRETINOIN 0.25 MG/G
CREAM TOPICAL NIGHTLY
Qty: 45 G | Refills: 2 | Status: SHIPPED | OUTPATIENT
Start: 2025-08-04

## (undated) DEVICE — TUBING HYDR IRR --

## (undated) DEVICE — FCPS RAD JAW 4LC 240CM W/NDL -- BX/40